# Patient Record
Sex: MALE | Race: WHITE | HISPANIC OR LATINO | Employment: FULL TIME | URBAN - METROPOLITAN AREA
[De-identification: names, ages, dates, MRNs, and addresses within clinical notes are randomized per-mention and may not be internally consistent; named-entity substitution may affect disease eponyms.]

---

## 2025-01-08 ENCOUNTER — OFFICE VISIT (OUTPATIENT)
Dept: ORTHOPEDIC SURGERY | Facility: HOSPITAL | Age: 45
End: 2025-01-08
Payer: COMMERCIAL

## 2025-01-08 VITALS — HEIGHT: 65 IN | BODY MASS INDEX: 32.49 KG/M2 | WEIGHT: 195 LBS

## 2025-01-08 DIAGNOSIS — T84.9XXA: ICD-10-CM

## 2025-01-08 DIAGNOSIS — Z96.612: ICD-10-CM

## 2025-01-08 PROCEDURE — 99215 OFFICE O/P EST HI 40 MIN: CPT | Performed by: ORTHOPAEDIC SURGERY

## 2025-01-08 PROCEDURE — 3008F BODY MASS INDEX DOCD: CPT | Performed by: ORTHOPAEDIC SURGERY

## 2025-01-08 RX ORDER — DOXYCYCLINE 100 MG/1
CAPSULE ORAL
COMMUNITY
Start: 2024-11-25

## 2025-01-08 RX ORDER — MELOXICAM 15 MG/1
15 TABLET ORAL DAILY
COMMUNITY

## 2025-01-08 RX ORDER — LEVOFLOXACIN 750 MG/1
750 TABLET ORAL
COMMUNITY
Start: 2024-12-03 | End: 2025-01-14

## 2025-01-08 NOTE — PROGRESS NOTES
Here for evaluation of his left shoulder this is a second opinion assessment.  He had been seen by me about 10 years ago had an anatomic arthroplasty in the left shoulder had been doing well until he was sliding had an accident and damage to the glenoid component.  He did not have any surgical intervention after that.  After several years later, he developed an infection in the area of the deltoid insertion laterally this was debrided by Dr. Jeremy Roland and at [facility].  He was culture positive for Serratia marcescens and had extended antibiotic course.  The infection recurred he had a second debridement and another course of IV and antibiotics orally for Serratia marcescens and cutibacterium acnes.  He has had a recommendation from his infectious disease doctor to have the prosthetic components removed as they may be a source of his recurrent infection.  Incidental note was the patient had a COVID-vaccine near that area a few weeks before this started.    The patient is pleasant and cooperative.  The patient is alert and oriented ×3.  Auditory function is intact.  The patient is a good historian.  The patient is not in acute distress.  Eye exam significant for nonicteric sclera, intact ocular muscle movement.  Breathing is rhythmic symmetric and nonlabored.  Left shoulder well-healed anterior scar and a small anterolateral scar with a cavitary defect in the soft tissues the scar is slightly erythematous on the lateral side of his shoulder.  No peripheral edema radial pulse easily palpable  strength 5/5.  Shoulder range of motion is quite limited external rotation -10 degrees internal rotation to mid sacrum elevation of 60 degrees actively and 90 degrees passively.    X-rays show disruption of the glenoid component and large amount of heterotopic ossification and apparent loosening of the humeral component.    Chronic infection left shoulder    We discussed the possible causes of the shoulder  infection which includes possible source from the prosthetic components.  We discussed the potential benefit of surgical treatment specifically two-stage explantation with placement of antibiotic spacer, IV antibiotic treatment, replacement with and reverse arthroplasty if possible.  We discussed the possible sources of infection including that the prosthetic components may not be the source of the infection.  We discussed the rehabilitation sequence required after staged surgery I explained to the patient has an extremely complicated procedure and will require preoperative planning including CT scan.  The patient would like to proceed and will be scheduled for CT scan and will follow-up by telephone after that has been obtained.    This was dictated using voice recognition software and not corrected for grammatical or spelling errors.

## 2025-01-17 ENCOUNTER — HOSPITAL ENCOUNTER (OUTPATIENT)
Dept: RADIOLOGY | Facility: CLINIC | Age: 45
Discharge: HOME | End: 2025-01-17
Payer: COMMERCIAL

## 2025-01-17 DIAGNOSIS — Z96.612: ICD-10-CM

## 2025-01-17 DIAGNOSIS — T84.9XXA: ICD-10-CM

## 2025-01-17 PROCEDURE — 76377 3D RENDER W/INTRP POSTPROCES: CPT

## 2025-01-17 PROCEDURE — 73200 CT UPPER EXTREMITY W/O DYE: CPT | Mod: LT

## 2025-01-20 ENCOUNTER — APPOINTMENT (OUTPATIENT)
Dept: RADIOLOGY | Facility: CLINIC | Age: 45
End: 2025-01-20
Payer: COMMERCIAL

## 2025-01-20 ENCOUNTER — OFFICE VISIT (OUTPATIENT)
Dept: ORTHOPEDIC SURGERY | Facility: HOSPITAL | Age: 45
End: 2025-01-20
Payer: COMMERCIAL

## 2025-01-20 DIAGNOSIS — Z96.612: Primary | ICD-10-CM

## 2025-01-20 DIAGNOSIS — T84.9XXA: Primary | ICD-10-CM

## 2025-01-20 PROCEDURE — 99213 OFFICE O/P EST LOW 20 MIN: CPT | Performed by: ORTHOPAEDIC SURGERY

## 2025-01-20 NOTE — PROGRESS NOTES
Patient is here for review of his CT scan of the left shoulder.    CT scan shows a large amount of heterotopic ossification displacement of the glenoid component and no obvious loosening of the humeral component.    Left shoulder infected prosthesis    We had a detailed discussion about treatment options at this point and I have recommended surgery the patient is scheduled for March 4 the procedure would be a left shoulder open debridement excision of heterotopic ossification removal of prosthesis insertion of antibiotic spacer.  This will be using [employer] instrumentation.    Scheduled for surgery.    This was dictated using voice recognition software and not corrected for grammatical or spelling errors.

## 2025-01-24 ENCOUNTER — PREP FOR PROCEDURE (OUTPATIENT)
Dept: ORTHOPEDIC SURGERY | Facility: HOSPITAL | Age: 45
End: 2025-01-24
Payer: COMMERCIAL

## 2025-01-24 DIAGNOSIS — Z96.612 INFECTION ASSOCIATED WITH PROSTHESIS OF LEFT SHOULDER JOINT (CMS-HCC): ICD-10-CM

## 2025-01-24 DIAGNOSIS — T84.59XA INFECTION ASSOCIATED WITH PROSTHESIS OF LEFT SHOULDER JOINT (CMS-HCC): ICD-10-CM

## 2025-02-17 ENCOUNTER — TELEPHONE (OUTPATIENT)
Dept: PREADMISSION TESTING | Facility: HOSPITAL | Age: 45
End: 2025-02-17
Payer: COMMERCIAL

## 2025-02-24 ENCOUNTER — DOCUMENTATION (OUTPATIENT)
Dept: PREADMISSION TESTING | Facility: HOSPITAL | Age: 45
End: 2025-02-24

## 2025-02-24 ENCOUNTER — PRE-ADMISSION TESTING (OUTPATIENT)
Dept: PREADMISSION TESTING | Facility: HOSPITAL | Age: 45
End: 2025-02-24
Payer: COMMERCIAL

## 2025-02-24 VITALS
HEART RATE: 73 BPM | WEIGHT: 200.62 LBS | HEIGHT: 65 IN | TEMPERATURE: 97.1 F | BODY MASS INDEX: 33.43 KG/M2 | SYSTOLIC BLOOD PRESSURE: 140 MMHG | RESPIRATION RATE: 16 BRPM | OXYGEN SATURATION: 97 % | DIASTOLIC BLOOD PRESSURE: 90 MMHG

## 2025-02-24 DIAGNOSIS — T84.59XA INFECTION ASSOCIATED WITH PROSTHESIS OF LEFT SHOULDER JOINT (CMS-HCC): Primary | ICD-10-CM

## 2025-02-24 DIAGNOSIS — Z96.612 INFECTION ASSOCIATED WITH PROSTHESIS OF LEFT SHOULDER JOINT (CMS-HCC): Primary | ICD-10-CM

## 2025-02-24 PROCEDURE — 87081 CULTURE SCREEN ONLY: CPT | Mod: AHULAB | Performed by: NURSE PRACTITIONER

## 2025-02-24 PROCEDURE — 99204 OFFICE O/P NEW MOD 45 MIN: CPT | Performed by: NURSE PRACTITIONER

## 2025-02-24 RX ORDER — CHLORHEXIDINE GLUCONATE ORAL RINSE 1.2 MG/ML
SOLUTION DENTAL
Qty: 473 ML | Refills: 0 | Status: SHIPPED | OUTPATIENT
Start: 2025-02-24

## 2025-02-24 RX ORDER — LEVOFLOXACIN 750 MG/1
750 TABLET ORAL DAILY
COMMUNITY

## 2025-02-24 NOTE — PREPROCEDURE INSTRUCTIONS
Medication List            Accurate as of February 24, 2025  8:21 AM. Always use your most recent med list.                chlorhexidine 0.12 % solution  Commonly known as: Peridex  SWISH AND SPIT 15ML FOR 30 SECONDS NIGHT PRIOR TO SURGERY AND MORNING OF SURGERY  Medication Adjustments for Surgery: Take/Use as prescribed     doxycycline 100 mg capsule  Commonly known as: Vibramycin  Medication Adjustments for Surgery: Take/Use as prescribed     levoFLOXacin 750 mg tablet  Commonly known as: Levaquin     meloxicam 15 mg tablet  Commonly known as: Mobic  Additional Medication Adjustments for Surgery: Take last dose 7 days before surgery                Preoperative Deep Breathing Exercises  Why it is important to do deep breathing exercises before my surgery?  Deep breathing exercises strengthen your breathing muscles.  This helps you to recover after your surgery and decreases the chance of breathing complications.  How are the deep breathing exercises done?  Sit straight with your back supported.  Breathe in deeply and slowly through your nose. Your lower rib cage should expand and your abdomen may move forward.  Hold that breath for 3 to 5 seconds.  Breathe out through pursed lips, slowly and completely.  Rest and repeat 10 times every hour while awake.  Rest longer if you become dizzy or lightheaded.        CONTACT SURGEON'S OFFICE IF YOU DEVELOP:  * Fever = 100.4 F   * New respiratory symptoms (e.g. cough, shortness of breath, respiratory distress, sore throat)  * Recent loss of taste or smell  *Flu like symptoms such as headache, fatigue or gastrointestinal symptoms  * You develop any open sores, shingles, burning or painful urination   AND/OR:  * You no longer wish to have the surgery.  * Any other personal circumstances change that may lead to the need to cancel or defer this surgery.  *You were admitted to any hospital within one week of your planned procedure.    SMOKING:  *Quitting smoking can make a huge  difference to your health and recovery from surgery.    *If you need help with quitting, call 0-991-QUIT-NOW.    THE DAY OF SURGERY:  *Do not eat any food after midnight the night before your surgery.   *YOU MUST drink 14 OUNCES of clear liquids TWO hours before your instructed ARRIVAL TIME to the hospital. This includes water, black tea/coffee (no milk or cream), apple juice, clear broth and electrolyte drinks (Gatorade).  Please avoid clear liquids that are red in color.   *You may chew gum/mints up to TWO hours before your surgery/procedure.    SURGICAL TIME:  *You will be contacted between 2 p.m. and 6 p.m. the business day before your surgery with your arrival time.  *If you haven't received a call by 6pm, call [phone].  *Scheduled surgery times may change and you will be notified if this occurs-check your personal voicemail for any updates.    ON THE MORNING OF SURGERY:  *Wear comfortable, loose fitting clothing.   *Do not use moisturizers, creams, lotions or perfume.  *All jewelry and valuables should be left at home.  *Prosthetic devices such as contact lenses, hearing aids, dentures, eyelash extensions, hairpins and body piercing must be removed before surgery.    BRING WITH YOU:  *Photo ID and insurance card  *Current list of medications and allergies  *Pacemaker/Defibrillator/Heart stent cards  *CPAP machine and mask  *Slings/splints/crutches  *Copy of your complete Advanced Directive/DHPOA-if applicable  *Neurostimulator implant remote    PARKING AND ARRIVAL:  *Check in at the Main Entrance desk and let them know you are here for surgery.  *You will be directed to the 2nd floor surgical waiting area.    IF YOU ARE HAVING OUTPATIENT/SAME DAY SURGERY:  *A responsible adult MUST accompany you at the time of discharge and stay with you for 24 hours after your surgery.  *You may NOT drive yourself home after surgery.  *You may use a taxi or ride sharing service (Lyft, Uber) to return home ONLY if you are  accompanied by a friend or family member.  *Instructions for resuming your medications will be provided by your surgeon.          Patient Information: Oral/Dental Rinse  **This is a prescription; pick it up at your preferred local pharmacy **  What is oral/dental rinse?   It is a mouthwash. It is a way of cleaning the mouth with a germ killing solution before your surgery.  The solution contains chlorhexidine, commonly known as CHG.   It is used inside the mouth to kill a bacteria known as Staphylococcus aureus.  Let your doctor know if you are allergic to Chlorhexidine.    Why do I need to use CHG oral/dental rinse?  The CHG oral/dental rinse helps to kill a bacteria in your mouth known a Staphylococcus aureus.     This reduces the risk of infection at the surgical site.      Using your CHG oral/dental rinse  STEPS:  Use your CHG oral/dental rinse after you brush your teeth the night before (at bedtime) and the morning of your surgery.  Follow all directions on your prescription label.    Use the cap on the container to measure 15ml (fill cap to fill line)  Swish (gargle if you can) the mouthwash in your mouth for at least 30 seconds, (do not to swallow) spit out  After you use your CHG rinse, do not rinse your mouth with water, drink or eat.  Please refer to prescription label for the appropriate time to resume oral intake  Dental rinse comes in one size bottle: 473ml ~16oz.  You will have leftover    rinse, discard after this use.    What side effects might I have using the CHG oral/dental rinse?  CHG rinse will stick to plaque on the teeth.  Brush and floss just before use.  Teeth brushing will help avoid staining of plaque during use.    Who should I contact if I have questions about the CHG oral/dental rinse?  Please call [facility], Preadmission Testing at [phone] if you have any questions      Home Preoperative Antibacterial Shower     What is a home preoperative  antibacterial shower?  This shower is a way of cleaning the skin with a germ killing soap before surgery.  The soap contains chlorhexidine, commonly known as CHG.  CHG is a soap for your skin with germ killing ability.  Let your doctor know if you are allergic to chlorhexidine.    Why do I need to take a preoperative antibacterial shower?  Skin is not sterile.  It is best to try to make your skin as free of germs as possible before surgery.  Proper cleansing with a germ killing soap before surgery can lower the number of germs on your skin.  This helps to reduce the risk of infection at the surgical site.  Following the instructions listed below will help you prepare your skin for surgery.      How do I use the CHG skin cleanser?  Steps:  Begin using your CHG soap five days before your scheduled surgery on ________________________.    Days 1-4 Shower before bed:  Wash your face and genitals with your normal soap and rinse.           2.    Apply the CHG soap to a clean wet washcloth.  Turn the water off or move away                From the water spray to avoid premature rinsing of the CHG soap as you are applying.     3.   Lather your entire body from the neck down.  Do not use on your face or genitals.  4. Pay special attention to the area(s) where your incision(s) will be located unless they are on your face.  Avoid scrubbing your skin too hard.  The important point is to have the CHG soap sit on your skin for 3 minutes.    When the 3 minutes are up, turn on the water and rinse the CHG soap off your body completely.   Pat yourself dry with a clean, freshly-laundered towel.  Dress in clean, freshly laundered night clothes.    Be sure to sleep with clean, freshly laundered sheets.  Day 5:  Last shower is the morning before surgery: Follow above Instructions.    NOTE:        *Keep CHG soap out of eyes and ear canals   *DO NOT wash with regular soap on your body after you have used the CHG soap solution  *DO NOT apply  powders, lotions, or perfume.  *Deodorant may be used days 1-4, BUT NOT the day of surgery    Who should I contact if I have any questions regarding the use of CHG soap?  Call the [facility], Preadmission Testing at [phone] if you have any questions.              Patient Information: Pre-Operative Infection Prevention Measures     Why did I have my nose, under my arms and groin swabbed?  The purpose of the swab is to identify Staphylococcus aureus inside your nose or on your skin.  The swab was sent to the laboratory for culture.  A positive swab/culture for Staphylococcus aureus is called colonization or carriage.      What is Staphylococcus aureus?  Staphylococcus aureus, also known as “staph”, is a germ found on the skin or in the nose of healthy people.  Sometimes Staphylococcus aureus can get into the body and cause an infection.  This can be minor (such as pimples, boils or other skin problems).  It might also be serious (such as blood infection, pneumonia or a surgical site infection).    What is Staphylococcus aureus colonization or carriage?  Colonization or carriage means that a person has the germ but is not sick from it.  These bacteria can be spread on the hands or when breathing or sneezing.    How is Staphylococcus aureus spread?  It is most often spread by close contact with a person or item that carries it.    What happens if my culture is positive for Staphylococcus aureus?  Your doctor/medical team will use this information to guide any antibiotic treatment which may be necessary.  Regardless of the culture results, we will clean the inside of your nose with a betadine swab just before you have your surgery.      Will I get an infection if I have Staphylococcus aureus in my nose or on my skin?  Anyone can get an infection with Staphylococcus aureus.  However, the best way to reduce your risk of infection is to follow the instructions provided to you for the use  of your CHG soap and dental rinse.        Who should I contact if I have any questions?  Call the [facility], Preadmission Testing at [phone] if you have any questions.

## 2025-02-24 NOTE — PREPROCEDURE INSTRUCTIONS
Medication List            Accurate as of February 24, 2025  8:07 AM. Always use your most recent med list.                chlorhexidine 0.12 % solution  Commonly known as: Peridex  SWISH AND SPIT 15ML FOR 30 SECONDS NIGHT PRIOR TO SURGERY AND MORNING OF SURGERY  Medication Adjustments for Surgery: Take/Use as prescribed     doxycycline 100 mg capsule  Commonly known as: Vibramycin  Medication Adjustments for Surgery: Take/Use as prescribed     meloxicam 15 mg tablet  Commonly known as: Mobic  Additional Medication Adjustments for Surgery: Take last dose 7 days before surgery                Preoperative Deep Breathing Exercises  Why it is important to do deep breathing exercises before my surgery?  Deep breathing exercises strengthen your breathing muscles.  This helps you to recover after your surgery and decreases the chance of breathing complications.  How are the deep breathing exercises done?  Sit straight with your back supported.  Breathe in deeply and slowly through your nose. Your lower rib cage should expand and your abdomen may move forward.  Hold that breath for 3 to 5 seconds.  Breathe out through pursed lips, slowly and completely.  Rest and repeat 10 times every hour while awake.  Rest longer if you become dizzy or lightheaded.        CONTACT SURGEON'S OFFICE IF YOU DEVELOP:  * Fever = 100.4 F   * New respiratory symptoms (e.g. cough, shortness of breath, respiratory distress, sore throat)  * Recent loss of taste or smell  *Flu like symptoms such as headache, fatigue or gastrointestinal symptoms  * You develop any open sores, shingles, burning or painful urination   AND/OR:  * You no longer wish to have the surgery.  * Any other personal circumstances change that may lead to the need to cancel or defer this surgery.  *You were admitted to any hospital within one week of your planned procedure.    SMOKING:  *Quitting smoking can make a huge difference to your health and recovery from surgery.    *If  you need help with quitting, call 5-548-QUIT-NOW.    THE DAY OF SURGERY:  *Do not eat any food after midnight the night before your surgery.   *YOU MUST drink 14 OUNCES of clear liquids TWO hours before your instructed ARRIVAL TIME to the hospital. This includes water, black tea/coffee (no milk or cream), apple juice, clear broth and electrolyte drinks (Gatorade).  Please avoid clear liquids that are red in color.   *You may chew gum/mints up to TWO hours before your surgery/procedure.    SURGICAL TIME:  *You will be contacted between 2 p.m. and 6 p.m. the business day before your surgery with your arrival time.  *If you haven't received a call by 6pm, call [phone].  *Scheduled surgery times may change and you will be notified if this occurs-check your personal voicemail for any updates.    ON THE MORNING OF SURGERY:  *Wear comfortable, loose fitting clothing.   *Do not use moisturizers, creams, lotions or perfume.  *All jewelry and valuables should be left at home.  *Prosthetic devices such as contact lenses, hearing aids, dentures, eyelash extensions, hairpins and body piercing must be removed before surgery.    BRING WITH YOU:  *Photo ID and insurance card  *Current list of medications and allergies  *Pacemaker/Defibrillator/Heart stent cards  *CPAP machine and mask  *Slings/splints/crutches  *Copy of your complete Advanced Directive/DHPOA-if applicable  *Neurostimulator implant remote    PARKING AND ARRIVAL:  *Check in at the Main Entrance desk and let them know you are here for surgery.  *You will be directed to the 2nd floor surgical waiting area.    IF YOU ARE HAVING OUTPATIENT/SAME DAY SURGERY:  *A responsible adult MUST accompany you at the time of discharge and stay with you for 24 hours after your surgery.  *You may NOT drive yourself home after surgery.  *You may use a taxi or ride sharing service ([employer], Uber) to return home ONLY if you are accompanied by a friend or family member.  *Instructions  for resuming your medications will be provided by your surgeon.        Patient Information: Oral/Dental Rinse  **This is a prescription; pick it up at your preferred local pharmacy **  What is oral/dental rinse?   It is a mouthwash. It is a way of cleaning the mouth with a germ killing solution before your surgery.  The solution contains chlorhexidine, commonly known as CHG.   It is used inside the mouth to kill a bacteria known as Staphylococcus aureus.  Let your doctor know if you are allergic to Chlorhexidine.    Why do I need to use CHG oral/dental rinse?  The CHG oral/dental rinse helps to kill a bacteria in your mouth known a Staphylococcus aureus.     This reduces the risk of infection at the surgical site.      Using your CHG oral/dental rinse  STEPS:  Use your CHG oral/dental rinse after you brush your teeth the night before (at bedtime) and the morning of your surgery.  Follow all directions on your prescription label.    Use the cap on the container to measure 15ml (fill cap to fill line)  Swish (gargle if you can) the mouthwash in your mouth for at least 30 seconds, (do not to swallow) spit out  After you use your CHG rinse, do not rinse your mouth with water, drink or eat.  Please refer to prescription label for the appropriate time to resume oral intake  Dental rinse comes in one size bottle: 473ml ~16oz.  You will have leftover    rinse, discard after this use.    What side effects might I have using the CHG oral/dental rinse?  CHG rinse will stick to plaque on the teeth.  Brush and floss just before use.  Teeth brushing will help avoid staining of plaque during use.    Who should I contact if I have questions about the CHG oral/dental rinse?  Please call [facility], Preadmission Testing at [phone] if you have any questions      Home Preoperative Antibacterial Shower     What is a home preoperative antibacterial shower?  This shower is a way of cleaning the skin  with a germ killing soap before surgery.  The soap contains chlorhexidine, commonly known as CHG.  CHG is a soap for your skin with germ killing ability.  Let your doctor know if you are allergic to chlorhexidine.    Why do I need to take a preoperative antibacterial shower?  Skin is not sterile.  It is best to try to make your skin as free of germs as possible before surgery.  Proper cleansing with a germ killing soap before surgery can lower the number of germs on your skin.  This helps to reduce the risk of infection at the surgical site.  Following the instructions listed below will help you prepare your skin for surgery.      How do I use the CHG skin cleanser?  Steps:  Begin using your CHG soap five days before your scheduled surgery on ________________________.    Days 1-4 Shower before bed:  Wash your face and genitals with your normal soap and rinse.           2.    Apply the CHG soap to a clean wet washcloth.  Turn the water off or move away                From the water spray to avoid premature rinsing of the CHG soap as you are applying.     3.   Lather your entire body from the neck down.  Do not use on your face or genitals.  4. Pay special attention to the area(s) where your incision(s) will be located unless they are on your face.  Avoid scrubbing your skin too hard.  The important point is to have the CHG soap sit on your skin for 3 minutes.    When the 3 minutes are up, turn on the water and rinse the CHG soap off your body completely.   Pat yourself dry with a clean, freshly-laundered towel.  Dress in clean, freshly laundered night clothes.    Be sure to sleep with clean, freshly laundered sheets.  Day 5:  Last shower is the morning before surgery: Follow above Instructions.    NOTE:        *Keep CHG soap out of eyes and ear canals   *DO NOT wash with regular soap on your body after you have used the CHG soap solution  *DO NOT apply powders, lotions, or perfume.  *Deodorant may be used days 1-4,  BUT NOT the day of surgery    Who should I contact if I have any questions regarding the use of CHG soap?  Call the [facility], Preadmission Testing at [phone] if you have any questions.              Patient Information: Pre-Operative Infection Prevention Measures     Why did I have my nose, under my arms and groin swabbed?  The purpose of the swab is to identify Staphylococcus aureus inside your nose or on your skin.  The swab was sent to the laboratory for culture.  A positive swab/culture for Staphylococcus aureus is called colonization or carriage.      What is Staphylococcus aureus?  Staphylococcus aureus, also known as “staph”, is a germ found on the skin or in the nose of healthy people.  Sometimes Staphylococcus aureus can get into the body and cause an infection.  This can be minor (such as pimples, boils or other skin problems).  It might also be serious (such as blood infection, pneumonia or a surgical site infection).    What is Staphylococcus aureus colonization or carriage?  Colonization or carriage means that a person has the germ but is not sick from it.  These bacteria can be spread on the hands or when breathing or sneezing.    How is Staphylococcus aureus spread?  It is most often spread by close contact with a person or item that carries it.    What happens if my culture is positive for Staphylococcus aureus?  Your doctor/medical team will use this information to guide any antibiotic treatment which may be necessary.  Regardless of the culture results, we will clean the inside of your nose with a betadine swab just before you have your surgery.      Will I get an infection if I have Staphylococcus aureus in my nose or on my skin?  Anyone can get an infection with Staphylococcus aureus.  However, the best way to reduce your risk of infection is to follow the instructions provided to you for the use of your CHG soap and dental rinse.        Who should I contact  if I have any questions?  Call the [facility], Preadmission Testing at [phone] if you have any questions.

## 2025-02-24 NOTE — CPM/PAT NURSE NOTE
CPM/PAT Nurse Note      Name: Ernesto Thurston (Ernesto Thurston)  /Age: [birth year]44 y.o.       Past Medical History:   Diagnosis Date    Asthma     ED (erectile dysfunction)     Hyperlipidemia     Infection of prosthetic shoulder joint (CMS-HCC)     Plan: Left Shoulder Open Debridement; Excision of Heterotopic Ossification; Removal of Prosthesis; Insertion of Antibiotic Spacer 3/4/25    Mass of skin of left shoulder     Obesity     Osteoarthritis of both hips resulting from hip dysplasia     Other conditions influencing health status     Chronic Pulmonary Embolism    PONV (postoperative nausea and vomiting)     Pulmonary embolism     Post-op PE s/p Embolectomy and IVC filter       Past Surgical History:   Procedure Laterality Date    ABCESS DRAINAGE Left 02/15/2023    shoulder    ABCESS DRAINAGE Left 2024    left shoulder, wound cultures    ANTERIOR CRUCIATE LIGAMENT REPAIR      EMBOLECTOMY      Pulmonary Artery Embolectomy    HERNIA REPAIR      MENISCECTOMY Left 2021    OTHER SURGICAL HISTORY      IVC filter placed    TOTAL SHOULDER ARTHROPLASTY Left     VASECTOMY      WOUND EXPLORATION  2022    EXPLORATION LEFT SHOULDER, I&D       Patient Sexual activity questions deferred to the physician.    Family History   Problem Relation Name Age of Onset    Heart attack Father      Coronary artery disease Father          h/o CABG    Coronary artery disease Paternal Grandfather         No Known Allergies    Prior to Admission medications    Medication Sig Start Date End Date Taking? Authorizing Provider   chlorhexidine (Peridex) 0.12 % solution SWISH AND SPIT 15ML FOR 30 SECONDS NIGHT PRIOR TO SURGERY AND MORNING OF SURGERY 25   KAREN Bass-CNP   doxycycline (Vibramycin) 100 mg capsule  24   Historical Provider, MD   meloxicam (Mobic) 15 mg tablet Take 1 tablet (15 mg) by mouth once daily.    Historical Provider, MD PAZ ROS     DASI Risk Score    No data to display        Caprini DVT Assessment    No data to display       Modified Frailty Index    No data to display       CHADS2 Stroke Risk         N/A 3 to 100%: High Risk   2 to < 3%: Medium Risk   0 to < 2%: Low Risk     Last Change: N/A          This score determines the patient's risk of having a stroke if the patient has atrial fibrillation.        This score is not applicable to this patient. Components are not calculated.          Revised Cardiac Risk Index    No data to display       Apfel Simplified Score    No data to display       Risk Analysis Index Results This Encounter    No data found in the last 10 encounters.       Prodigy: High Risk  Total Score: 8              Prodigy Gender Score          ARISCAT Score for Postoperative Pulmonary Complications    No data to display       Mills Perioperative Risk for Myocardial Infarction or Cardiac Arrest (SANTI)    No data to display         Nurse Plan of Action:     After Visit Summary (AVS) reviewed and patient verbalized good understanding of medications and NPO instructions.  Pre-op infection prevention measures:  CHG showers and mouthwash reviewed, understanding voiced.  CHG soap given and patient verbalized need to pick CHG mouthwash at their preferred local pharmacy.

## 2025-02-26 LAB — STAPHYLOCOCCUS SPEC CULT: NORMAL

## 2025-02-27 NOTE — CPM/PAT H&P
[facility] Evaluation       Name: Ernesto Thurston (Ernesto Thurston)  /Age: 1980/44 y.o.     In-Person         Date of Consult: 25    Referring Provider:  Dr. Crain    Date, Surgery, and Length:  3/4/25, left shoulder open debridement, excision of heterotopic ossification, removal of prosthesis, insertion of antibiotic spacer-150 minutes    Patient presents to [facility] for perioperative risk assessment prior to scheduled surgery. Patient presents with left shoulder infected prosthesis. He underwent arthroplasty 10 years ago and damaged his glenoid in an accident. After several years later, he developed an infection in the area of the deltoid insertion laterally this was debrided at [facility]. He was culture positive for Serratia marcescens and had extended antibiotic course. The infection recurred he had a second debridement and another course of IV and antibiotics orally for Serratia marcescens and cutibacterium acnes. He has had a recommendation from his infectious disease doctor to have the prosthetic components removed as they may be a source of his recurrent infection.     This note was created in part upon personal review of patient's medical records.      Pt denies any past history of anesthetic complications such as PONV, awareness, prolonged sedation, dental damage, aspiration, cardiac arrest, difficult intubation, difficult I.V. access or unexpected hospital admissions. No history of malignant hyperthermia and or pseudocholinesterase deficiency.    No history of blood transfusions.    The patient IS NOT a Judaism and will accept blood and blood products if medically indicated.     Type and screen not sent.    Past Medical History:   Diagnosis Date    Asthma     ED (erectile dysfunction)     Hyperlipidemia     Infection of prosthetic shoulder joint (CMS-[facility])     Plan: Left Shoulder Open Debridement; Excision of Heterotopic Ossification; Removal of Prosthesis; Insertion  "of Antibiotic Spacer 3/4/25    Mass of skin of left shoulder     Obesity     Osteoarthritis of both hips resulting from hip dysplasia     Other conditions influencing health status     Chronic Pulmonary Embolism    PONV (postoperative nausea and vomiting)     Pulmonary embolism     Post-op PE s/p Embolectomy and IVC filter       Past Surgical History:   Procedure Laterality Date    ABCESS DRAINAGE Left 02/15/2023    shoulder    ABCESS DRAINAGE Left 11/13/2024    left shoulder, wound cultures    ANTERIOR CRUCIATE LIGAMENT REPAIR      EMBOLECTOMY      Pulmonary Artery Embolectomy    HERNIA REPAIR      MENISCECTOMY Left 12/08/2021    OTHER SURGICAL HISTORY      IVC filter placed    TOTAL SHOULDER ARTHROPLASTY Left 2014    VASECTOMY      WOUND EXPLORATION  09/02/2022    EXPLORATION LEFT SHOULDER, I&D       Family History   Problem Relation Name Age of Onset    Heart attack Father      Coronary artery disease Father          h/o CABG    Coronary artery disease Paternal Grandfather       Social History     Tobacco Use   Smoking Status Never   Smokeless Tobacco Never     Social History     Substance and Sexual Activity   Alcohol Use Not Currently     Social History     Substance and Sexual Activity   Drug Use Never       No Known Allergies    Current Outpatient Medications   Medication Instructions    chlorhexidine (Peridex) 0.12 % solution SWISH AND SPIT 15ML FOR 30 SECONDS NIGHT PRIOR TO SURGERY AND MORNING OF SURGERY    doxycycline (Vibramycin) 100 mg capsule     levoFLOXacin (LEVAQUIN) 750 mg, Daily    meloxicam (MOBIC) 15 mg, Daily        PAT ROS     PAT Physical Exam     Airway    Visit Vitals  /90   Pulse 73   Temp 36.2 °C (97.1 °F)   Resp 16   Ht 1.65 m (5' 4.96\")   Wt 91 kg (200 lb 9.9 oz)   SpO2 97%   BMI 33.42 kg/m²   Smoking Status Never   BSA 2.04 m²     Assessment and Plan:     Patient is a 44 year old male scheduled for left shoulder open debridement, excision of heterotopic ossification, removal of " prosthesis, insertion of antibiotic spacer-left  with Dr. Crain on 3/4/25.    Patient is at acceptable risk to proceed with planned surgical procedure. Further cardiac risk stratification deferred at this time.This patient is LOW-INTERMEDIATE risk candidate undergoing MODERATE risk procedure, patient is medically optimized for surgery.    Plan    Cardiovascular:    RCRI: 0  Risk of Mace: 3.9%      Patient denies any chest pain, tightness, heaviness, pressure, radiating pain, palpitations, irregular heartbeats, lightheadedness, cough, congestion, shortness of breath, PETERSON, PND, near syncope, weight loss or gain.    Fair functional capacity      EKG in PAT not obtained.    Pulmonary:  No pulmonary diagnosis, however patient is at increased risk of perioperative complications secondary to  types of anesthetic  Stop Bang score is 2 placing patient at low risk for ANGELA  ARISCAT: 26-44 points, 13.3% risk of in-hospital postoperative pulmonary complication  PRODIGY: Moderate risk for opioid induced respiratory depression      Renal/endo:  Recommendations to avoid nephrotoxic drugs and carefully monitor fluid status to maintain euvolemia. Use dose adjusted medications as needed for the underlying level of renal function.      Heme:  Patient instructed to ambulate as soon as possible postoperatively to decrease thromboembolic risk.    Initiate mechanical DVT prophylaxis as soon as possible and initiate chemical prophylaxis when deemed safe from a bleeding standpoint post surgery.    Post-op PE s/p embolectomy and IVC filter    Caprini= 6      Risk assessment complete.  Patient is scheduled for a INTERMEDIATE surgical risk procedure.     He IS considered medically optimized for the planned procedure.        Labs/testing obtained in PAT on 2/27/25: MRSA. Reviewed last outside CBC, BMP, CRP    CBC WITH DIFFERENTIAL  Order: [id number]  Component  Ref Range & Units 1 mo ago   WBC  4.5 - 11.5 K/uL 7   RBC  4.50 - 5.90 M/uL 5.87    Hemoglobin  13.9 - 16.3 g/dL 16.6 High    Hematocrit  41.0 - 53.0 % 48.5   MCV  80 - 100 fL 83   MCH  26.0 - 34.0 pg 28.3   MCHC  32.0 - 35.9 g/dL 34.3   Platelet  150 - 400 K/uL 314   RDW-CV  11.5 - 14.5 % 14   MPV  7.5 - 11.2 fL 7.6   Neutrophils  31.0 - 76.0 % 56.7   Neutrophil #  1.50 - 8.00 K/uL 3.95   Lymphocytes  24.0 - 44.0 % 31.7   Lymphocytes #  1.00 - 4.80 K/uL 2.21   Monocytes  2.0 - 11.0 % 7.2   Monocyte #  0.20 - 1.00 K/uL 0.5   Eosinophil  0.1 - 4.0 % 4   Eosinophil #  0.00 - 0.70 K/uL 0.28   Basophils  <=1.9 % 0.4   Basophil #  0.00 - 0.20 K/uL 0.03   Resulting Agency [facility]     Specimen Collected: 01/08/25 14:50    Performed by: [facility] Last Resulted: 01/08/25 18:15       BASIC METABOLIC PANEL  Order: [id number]  Component  Ref Range & Units 1 mo ago   Glucose  74 - 109 mg/dL 93   Sodium  136 - 145 mmol/L 140   Potassium  3.5 - 5.0 mmol/L 4.3   Carbon Dioxide  21 - 31 mmol/L 29   Chloride  98 - 107 mmol/L 102   Blood Urea Nitrogen  7 - 25 mg/dL 12   Creatinine  0.70 - 1.30 mg/dL 0.97   Calcium  8.6 - 10.3 mg/dL 9.5   Anion Gap  10 - 20 13   Estimated GFR (CKD-EPI)  >=60 mL/min/1.73sqm 98   Comment: 2021 CKD EPI Equation using Creatinine without Race  Comment:  Estimated glomerular filtration rate (eGFR) is calculated without a race coefficient. Values should be interpreted in the context of the patient's full clinical presentation.  Reference:  1. Teja C, Carla M, Paulie STARR, et al.. A Unifying Approach for GFR Estimation: Recommendations of the NKF-ASN Task Force on Reassessing the Inclusion of Race in Diagnosing Kidney Disease. American Journal of Kidney Diseases 2022;79(2):268-88.e1.  2. N Engl J Med 2021 Vol. 385 Issue 19 Pages 0690-5646   Resulting Agency [facility]     Specimen Collected: 01/08/25 14:50    Performed by: [facility] Last Resulted: 01/08/25 18:10   C-REACTIVE PROTEIN  Order: [id number]  Component  Ref Range & Units  1 mo ago   C-Reactive Protein  <0.5 mg/dL <0.5   Resulting Agency [facility]     Specimen Collected: 01/08/25 14:50    Performed by: [facility] Last Resulted: 01/08/25 18:10       Follow up/communication: none    Preoperative medication instructions were provided and reviewed with the patient.  Any additional testing or evaluation was explained to the patient.  Nothing by mouth instructions were discussed and patient's questions were answered prior to conclusion to this encounter.  Patient verbalized understanding of preoperative instructions given in preadmission testing; discharge instructions available in EMR.    This note was dictated with speech recognition.  Minor errors may have been detected during use of speech recognition.

## 2025-03-04 ENCOUNTER — ANESTHESIA EVENT (OUTPATIENT)
Dept: OPERATING ROOM | Facility: HOSPITAL | Age: 45
End: 2025-03-04
Payer: COMMERCIAL

## 2025-03-04 ENCOUNTER — ANESTHESIA (OUTPATIENT)
Dept: OPERATING ROOM | Facility: HOSPITAL | Age: 45
End: 2025-03-04
Payer: COMMERCIAL

## 2025-03-04 ENCOUNTER — APPOINTMENT (OUTPATIENT)
Dept: RADIOLOGY | Facility: HOSPITAL | Age: 45
End: 2025-03-04
Payer: COMMERCIAL

## 2025-03-04 ENCOUNTER — HOSPITAL ENCOUNTER (OUTPATIENT)
Facility: HOSPITAL | Age: 45
Setting detail: OBSERVATION
LOS: 1 days | Discharge: HOME | End: 2025-03-07
Attending: ORTHOPAEDIC SURGERY | Admitting: ORTHOPAEDIC SURGERY
Payer: COMMERCIAL

## 2025-03-04 DIAGNOSIS — Z96.612 INFECTION ASSOCIATED WITH PROSTHESIS OF LEFT SHOULDER JOINT (CMS-HCC): ICD-10-CM

## 2025-03-04 DIAGNOSIS — T84.59XA INFECTION ASSOCIATED WITH PROSTHESIS OF LEFT SHOULDER JOINT (CMS-HCC): ICD-10-CM

## 2025-03-04 DIAGNOSIS — Z98.890 STATUS POST SHOULDER SURGERY: Primary | ICD-10-CM

## 2025-03-04 PROBLEM — E78.5 HYPERLIPIDEMIA: Status: ACTIVE | Noted: 2025-03-04

## 2025-03-04 PROBLEM — E66.9 OBESITY: Status: ACTIVE | Noted: 2025-03-04

## 2025-03-04 PROBLEM — J45.909 ASTHMA: Status: ACTIVE | Noted: 2025-03-04

## 2025-03-04 PROCEDURE — C1776 JOINT DEVICE (IMPLANTABLE): HCPCS | Performed by: ORTHOPAEDIC SURGERY

## 2025-03-04 PROCEDURE — 87116 MYCOBACTERIA CULTURE: CPT | Mod: AHULAB | Performed by: ORTHOPAEDIC SURGERY

## 2025-03-04 PROCEDURE — 2500000005 HC RX 250 GENERAL PHARMACY W/O HCPCS: Performed by: ANESTHESIOLOGY

## 2025-03-04 PROCEDURE — 64415 NJX AA&/STRD BRCH PLXS IMG: CPT | Performed by: ANESTHESIOLOGY

## 2025-03-04 PROCEDURE — 7100000002 HC RECOVERY ROOM TIME - EACH INCREMENTAL 1 MINUTE: Performed by: ORTHOPAEDIC SURGERY

## 2025-03-04 PROCEDURE — 9420000001 HC RT PATIENT EDUCATION 5 MIN

## 2025-03-04 PROCEDURE — 2500000005 HC RX 250 GENERAL PHARMACY W/O HCPCS: Performed by: ORTHOPAEDIC SURGERY

## 2025-03-04 PROCEDURE — 2500000004 HC RX 250 GENERAL PHARMACY W/ HCPCS (ALT 636 FOR OP/ED): Performed by: ANESTHESIOLOGY

## 2025-03-04 PROCEDURE — 73020 X-RAY EXAM OF SHOULDER: CPT | Mod: LEFT SIDE | Performed by: STUDENT IN AN ORGANIZED HEALTH CARE EDUCATION/TRAINING PROGRAM

## 2025-03-04 PROCEDURE — 3600000005 HC OR TIME - INITIAL BASE CHARGE - PROCEDURE LEVEL FIVE: Performed by: ORTHOPAEDIC SURGERY

## 2025-03-04 PROCEDURE — 3600000010 HC OR TIME - EACH INCREMENTAL 1 MINUTE - PROCEDURE LEVEL FIVE: Performed by: ORTHOPAEDIC SURGERY

## 2025-03-04 PROCEDURE — 73020 X-RAY EXAM OF SHOULDER: CPT | Mod: LT

## 2025-03-04 PROCEDURE — A23334: Performed by: ANESTHESIOLOGIST ASSISTANT

## 2025-03-04 PROCEDURE — 2780000003 HC OR 278 NO HCPCS: Performed by: ORTHOPAEDIC SURGERY

## 2025-03-04 PROCEDURE — 87206 SMEAR FLUORESCENT/ACID STAI: CPT | Mod: AHULAB | Performed by: ORTHOPAEDIC SURGERY

## 2025-03-04 PROCEDURE — A23334: Performed by: ANESTHESIOLOGY

## 2025-03-04 PROCEDURE — 7100000011 HC EXTENDED STAY RECOVERY HOURLY - NURSING UNIT

## 2025-03-04 PROCEDURE — 2500000001 HC RX 250 WO HCPCS SELF ADMINISTERED DRUGS (ALT 637 FOR MEDICARE OP): Performed by: PHYSICIAN ASSISTANT

## 2025-03-04 PROCEDURE — 2500000004 HC RX 250 GENERAL PHARMACY W/ HCPCS (ALT 636 FOR OP/ED): Performed by: ORTHOPAEDIC SURGERY

## 2025-03-04 PROCEDURE — C1713 ANCHOR/SCREW BN/BN,TIS/BN: HCPCS | Performed by: ORTHOPAEDIC SURGERY

## 2025-03-04 PROCEDURE — 2720000007 HC OR 272 NO HCPCS: Performed by: ORTHOPAEDIC SURGERY

## 2025-03-04 PROCEDURE — 2500000004 HC RX 250 GENERAL PHARMACY W/ HCPCS (ALT 636 FOR OP/ED): Performed by: ANESTHESIOLOGIST ASSISTANT

## 2025-03-04 PROCEDURE — 23334 SHOULDER PROSTHESIS REMOVAL: CPT | Performed by: ORTHOPAEDIC SURGERY

## 2025-03-04 PROCEDURE — 87102 FUNGUS ISOLATION CULTURE: CPT | Mod: AHULAB | Performed by: ORTHOPAEDIC SURGERY

## 2025-03-04 PROCEDURE — 3700000002 HC GENERAL ANESTHESIA TIME - EACH INCREMENTAL 1 MINUTE: Performed by: ORTHOPAEDIC SURGERY

## 2025-03-04 PROCEDURE — 11981 INSERTION DRUG DLVR IMPLANT: CPT | Performed by: ORTHOPAEDIC SURGERY

## 2025-03-04 PROCEDURE — 87070 CULTURE OTHR SPECIMN AEROBIC: CPT | Performed by: INTERNAL MEDICINE

## 2025-03-04 PROCEDURE — 7100000001 HC RECOVERY ROOM TIME - INITIAL BASE CHARGE: Performed by: ORTHOPAEDIC SURGERY

## 2025-03-04 PROCEDURE — 2500000004 HC RX 250 GENERAL PHARMACY W/ HCPCS (ALT 636 FOR OP/ED): Performed by: PHYSICIAN ASSISTANT

## 2025-03-04 PROCEDURE — 96361 HYDRATE IV INFUSION ADD-ON: CPT | Mod: 59

## 2025-03-04 PROCEDURE — 87205 SMEAR GRAM STAIN: CPT | Performed by: INTERNAL MEDICINE

## 2025-03-04 PROCEDURE — 3700000001 HC GENERAL ANESTHESIA TIME - INITIAL BASE CHARGE: Performed by: ORTHOPAEDIC SURGERY

## 2025-03-04 DEVICE — IMPLANTABLE DEVICE: Type: IMPLANTABLE DEVICE | Site: SHOULDER | Status: FUNCTIONAL

## 2025-03-04 DEVICE — IMPLANTABLE DEVICE
Type: IMPLANTABLE DEVICE | Site: SHOULDER | Status: FUNCTIONAL
Brand: REFOBACIN® BONE CEMENT R

## 2025-03-04 RX ORDER — ACETAMINOPHEN 325 MG/1
650 TABLET ORAL EVERY 4 HOURS PRN
Status: DISCONTINUED | OUTPATIENT
Start: 2025-03-04 | End: 2025-03-04 | Stop reason: HOSPADM

## 2025-03-04 RX ORDER — HYDROCODONE BITARTRATE AND ACETAMINOPHEN 5; 325 MG/1; MG/1
1 TABLET ORAL EVERY 4 HOURS PRN
Status: DISCONTINUED | OUTPATIENT
Start: 2025-03-04 | End: 2025-03-04 | Stop reason: HOSPADM

## 2025-03-04 RX ORDER — HYDROCODONE BITARTRATE AND ACETAMINOPHEN 5; 325 MG/1; MG/1
2 TABLET ORAL EVERY 4 HOURS PRN
Status: DISCONTINUED | OUTPATIENT
Start: 2025-03-04 | End: 2025-03-04 | Stop reason: HOSPADM

## 2025-03-04 RX ORDER — NALOXONE HYDROCHLORIDE 0.4 MG/ML
0.2 INJECTION, SOLUTION INTRAMUSCULAR; INTRAVENOUS; SUBCUTANEOUS EVERY 5 MIN PRN
Status: DISCONTINUED | OUTPATIENT
Start: 2025-03-04 | End: 2025-03-04 | Stop reason: HOSPADM

## 2025-03-04 RX ORDER — ONDANSETRON 4 MG/1
4 TABLET, ORALLY DISINTEGRATING ORAL EVERY 8 HOURS PRN
Status: DISCONTINUED | OUTPATIENT
Start: 2025-03-04 | End: 2025-03-07 | Stop reason: HOSPADM

## 2025-03-04 RX ORDER — VANCOMYCIN HYDROCHLORIDE 1 G/20ML
INJECTION, POWDER, LYOPHILIZED, FOR SOLUTION INTRAVENOUS AS NEEDED
Status: DISCONTINUED | OUTPATIENT
Start: 2025-03-04 | End: 2025-03-04 | Stop reason: HOSPADM

## 2025-03-04 RX ORDER — MIDAZOLAM HYDROCHLORIDE 1 MG/ML
INJECTION INTRAMUSCULAR; INTRAVENOUS AS NEEDED
Status: DISCONTINUED | OUTPATIENT
Start: 2025-03-04 | End: 2025-03-04

## 2025-03-04 RX ORDER — ACETAMINOPHEN 325 MG/1
650 TABLET ORAL EVERY 6 HOURS SCHEDULED
Status: DISCONTINUED | OUTPATIENT
Start: 2025-03-04 | End: 2025-03-07 | Stop reason: HOSPADM

## 2025-03-04 RX ORDER — ONDANSETRON HYDROCHLORIDE 2 MG/ML
4 INJECTION, SOLUTION INTRAVENOUS EVERY 8 HOURS PRN
Status: DISCONTINUED | OUTPATIENT
Start: 2025-03-04 | End: 2025-03-04 | Stop reason: HOSPADM

## 2025-03-04 RX ORDER — ONDANSETRON 4 MG/1
4 TABLET, ORALLY DISINTEGRATING ORAL EVERY 8 HOURS PRN
Status: DISCONTINUED | OUTPATIENT
Start: 2025-03-04 | End: 2025-03-04 | Stop reason: HOSPADM

## 2025-03-04 RX ORDER — ALBUTEROL SULFATE 0.83 MG/ML
2.5 SOLUTION RESPIRATORY (INHALATION) ONCE AS NEEDED
Status: DISCONTINUED | OUTPATIENT
Start: 2025-03-04 | End: 2025-03-04 | Stop reason: HOSPADM

## 2025-03-04 RX ORDER — BUPIVACAINE HCL/EPINEPHRINE 0.5-1:200K
VIAL (ML) INJECTION AS NEEDED
Status: DISCONTINUED | OUTPATIENT
Start: 2025-03-04 | End: 2025-03-04

## 2025-03-04 RX ORDER — DEXMEDETOMIDINE IN 0.9 % NACL 20 MCG/5ML
SYRINGE (ML) INTRAVENOUS AS NEEDED
Status: DISCONTINUED | OUTPATIENT
Start: 2025-03-04 | End: 2025-03-04

## 2025-03-04 RX ORDER — ONDANSETRON HYDROCHLORIDE 2 MG/ML
INJECTION, SOLUTION INTRAVENOUS AS NEEDED
Status: DISCONTINUED | OUTPATIENT
Start: 2025-03-04 | End: 2025-03-04

## 2025-03-04 RX ORDER — LIDOCAINE HYDROCHLORIDE 10 MG/ML
0.1 INJECTION, SOLUTION EPIDURAL; INFILTRATION; INTRACAUDAL; PERINEURAL ONCE
Status: DISCONTINUED | OUTPATIENT
Start: 2025-03-04 | End: 2025-03-04 | Stop reason: HOSPADM

## 2025-03-04 RX ORDER — IPRATROPIUM BROMIDE 0.5 MG/2.5ML
500 SOLUTION RESPIRATORY (INHALATION) ONCE
Status: DISCONTINUED | OUTPATIENT
Start: 2025-03-04 | End: 2025-03-04 | Stop reason: HOSPADM

## 2025-03-04 RX ORDER — DROPERIDOL 2.5 MG/ML
0.62 INJECTION, SOLUTION INTRAMUSCULAR; INTRAVENOUS ONCE AS NEEDED
Status: DISCONTINUED | OUTPATIENT
Start: 2025-03-04 | End: 2025-03-04 | Stop reason: HOSPADM

## 2025-03-04 RX ORDER — PROPOFOL 10 MG/ML
INJECTION, EMULSION INTRAVENOUS AS NEEDED
Status: DISCONTINUED | OUTPATIENT
Start: 2025-03-04 | End: 2025-03-04

## 2025-03-04 RX ORDER — MELOXICAM 7.5 MG/1
15 TABLET ORAL DAILY
Status: DISCONTINUED | OUTPATIENT
Start: 2025-03-04 | End: 2025-03-04 | Stop reason: HOSPADM

## 2025-03-04 RX ORDER — AMOXICILLIN 250 MG
2 CAPSULE ORAL 2 TIMES DAILY
Status: DISCONTINUED | OUTPATIENT
Start: 2025-03-04 | End: 2025-03-07 | Stop reason: HOSPADM

## 2025-03-04 RX ORDER — CEFAZOLIN SODIUM 2 G/100ML
2 INJECTION, SOLUTION INTRAVENOUS EVERY 8 HOURS
Status: DISCONTINUED | OUTPATIENT
Start: 2025-03-04 | End: 2025-03-04 | Stop reason: HOSPADM

## 2025-03-04 RX ORDER — ONDANSETRON HYDROCHLORIDE 2 MG/ML
4 INJECTION, SOLUTION INTRAVENOUS ONCE AS NEEDED
Status: DISCONTINUED | OUTPATIENT
Start: 2025-03-04 | End: 2025-03-04 | Stop reason: HOSPADM

## 2025-03-04 RX ORDER — FENTANYL CITRATE 50 UG/ML
INJECTION, SOLUTION INTRAMUSCULAR; INTRAVENOUS AS NEEDED
Status: DISCONTINUED | OUTPATIENT
Start: 2025-03-04 | End: 2025-03-04

## 2025-03-04 RX ORDER — SODIUM CHLORIDE, SODIUM LACTATE, POTASSIUM CHLORIDE, CALCIUM CHLORIDE 600; 310; 30; 20 MG/100ML; MG/100ML; MG/100ML; MG/100ML
50 INJECTION, SOLUTION INTRAVENOUS CONTINUOUS
Status: DISCONTINUED | OUTPATIENT
Start: 2025-03-04 | End: 2025-03-04 | Stop reason: HOSPADM

## 2025-03-04 RX ORDER — SODIUM CHLORIDE 0.9 G/100ML
INJECTION, SOLUTION IRRIGATION AS NEEDED
Status: DISCONTINUED | OUTPATIENT
Start: 2025-03-04 | End: 2025-03-04 | Stop reason: HOSPADM

## 2025-03-04 RX ORDER — ROCURONIUM BROMIDE 10 MG/ML
INJECTION, SOLUTION INTRAVENOUS AS NEEDED
Status: DISCONTINUED | OUTPATIENT
Start: 2025-03-04 | End: 2025-03-04

## 2025-03-04 RX ORDER — CEFAZOLIN 1 G/1
INJECTION, POWDER, FOR SOLUTION INTRAVENOUS AS NEEDED
Status: DISCONTINUED | OUTPATIENT
Start: 2025-03-04 | End: 2025-03-04

## 2025-03-04 RX ORDER — SODIUM CHLORIDE, SODIUM LACTATE, POTASSIUM CHLORIDE, CALCIUM CHLORIDE 600; 310; 30; 20 MG/100ML; MG/100ML; MG/100ML; MG/100ML
100 INJECTION, SOLUTION INTRAVENOUS CONTINUOUS
Status: DISCONTINUED | OUTPATIENT
Start: 2025-03-04 | End: 2025-03-04

## 2025-03-04 RX ORDER — HYDROCODONE BITARTRATE AND ACETAMINOPHEN 5; 325 MG/1; MG/1
2 TABLET ORAL EVERY 4 HOURS PRN
Status: DISCONTINUED | OUTPATIENT
Start: 2025-03-04 | End: 2025-03-07 | Stop reason: HOSPADM

## 2025-03-04 RX ORDER — ONDANSETRON HYDROCHLORIDE 2 MG/ML
4 INJECTION, SOLUTION INTRAVENOUS EVERY 8 HOURS PRN
Status: DISCONTINUED | OUTPATIENT
Start: 2025-03-04 | End: 2025-03-07 | Stop reason: HOSPADM

## 2025-03-04 RX ORDER — AMOXICILLIN 250 MG
2 CAPSULE ORAL 2 TIMES DAILY
Status: DISCONTINUED | OUTPATIENT
Start: 2025-03-04 | End: 2025-03-04 | Stop reason: HOSPADM

## 2025-03-04 RX ORDER — CEFAZOLIN SODIUM 2 G/100ML
2 INJECTION, SOLUTION INTRAVENOUS EVERY 8 HOURS
Status: COMPLETED | OUTPATIENT
Start: 2025-03-04 | End: 2025-03-05

## 2025-03-04 RX ORDER — NALOXONE HYDROCHLORIDE 0.4 MG/ML
0.2 INJECTION, SOLUTION INTRAMUSCULAR; INTRAVENOUS; SUBCUTANEOUS EVERY 5 MIN PRN
Status: DISCONTINUED | OUTPATIENT
Start: 2025-03-04 | End: 2025-03-07 | Stop reason: HOSPADM

## 2025-03-04 RX ORDER — TOBRAMYCIN 1.2 G/30ML
INJECTION, POWDER, LYOPHILIZED, FOR SOLUTION INTRAVENOUS AS NEEDED
Status: DISCONTINUED | OUTPATIENT
Start: 2025-03-04 | End: 2025-03-04 | Stop reason: HOSPADM

## 2025-03-04 RX ORDER — SODIUM CHLORIDE, SODIUM LACTATE, POTASSIUM CHLORIDE, CALCIUM CHLORIDE 600; 310; 30; 20 MG/100ML; MG/100ML; MG/100ML; MG/100ML
50 INJECTION, SOLUTION INTRAVENOUS CONTINUOUS
Status: DISCONTINUED | OUTPATIENT
Start: 2025-03-04 | End: 2025-03-05

## 2025-03-04 RX ORDER — HYDROCODONE BITARTRATE AND ACETAMINOPHEN 5; 325 MG/1; MG/1
1 TABLET ORAL EVERY 4 HOURS PRN
Status: DISCONTINUED | OUTPATIENT
Start: 2025-03-04 | End: 2025-03-07 | Stop reason: HOSPADM

## 2025-03-04 RX ORDER — SODIUM CHLORIDE, SODIUM LACTATE, POTASSIUM CHLORIDE, CALCIUM CHLORIDE 600; 310; 30; 20 MG/100ML; MG/100ML; MG/100ML; MG/100ML
INJECTION, SOLUTION INTRAVENOUS CONTINUOUS PRN
Status: DISCONTINUED | OUTPATIENT
Start: 2025-03-04 | End: 2025-03-04

## 2025-03-04 RX ORDER — ACETAMINOPHEN 325 MG/1
650 TABLET ORAL EVERY 6 HOURS SCHEDULED
Status: DISCONTINUED | OUTPATIENT
Start: 2025-03-04 | End: 2025-03-04 | Stop reason: HOSPADM

## 2025-03-04 RX ADMIN — SENNOSIDES AND DOCUSATE SODIUM 2 TABLET: 50; 8.6 TABLET ORAL at 20:57

## 2025-03-04 RX ADMIN — PROPOFOL 200 MG: 10 INJECTION, EMULSION INTRAVENOUS at 11:12

## 2025-03-04 RX ADMIN — Medication 20 MCG: at 10:12

## 2025-03-04 RX ADMIN — CEFAZOLIN SODIUM 2 G: 2 INJECTION, SOLUTION INTRAVENOUS at 20:57

## 2025-03-04 RX ADMIN — Medication 15 ML: at 10:12

## 2025-03-04 RX ADMIN — SUGAMMADEX 200 MG: 100 INJECTION, SOLUTION INTRAVENOUS at 13:33

## 2025-03-04 RX ADMIN — SODIUM CHLORIDE, POTASSIUM CHLORIDE, SODIUM LACTATE AND CALCIUM CHLORIDE 50 ML/HR: 600; 310; 30; 20 INJECTION, SOLUTION INTRAVENOUS at 17:09

## 2025-03-04 RX ADMIN — MIDAZOLAM HYDROCHLORIDE 2 MG: 1 INJECTION, SOLUTION INTRAMUSCULAR; INTRAVENOUS at 11:10

## 2025-03-04 RX ADMIN — SODIUM CHLORIDE, POTASSIUM CHLORIDE, SODIUM LACTATE AND CALCIUM CHLORIDE 100 ML/HR: 600; 310; 30; 20 INJECTION, SOLUTION INTRAVENOUS at 09:24

## 2025-03-04 RX ADMIN — ONDANSETRON 4 MG: 2 INJECTION, SOLUTION INTRAMUSCULAR; INTRAVENOUS at 12:59

## 2025-03-04 RX ADMIN — CEFAZOLIN 2 G: 330 INJECTION, POWDER, FOR SOLUTION INTRAMUSCULAR; INTRAVENOUS at 12:08

## 2025-03-04 RX ADMIN — ROCURONIUM BROMIDE 80 MG: 10 INJECTION, SOLUTION INTRAVENOUS at 11:12

## 2025-03-04 RX ADMIN — ROCURONIUM BROMIDE 20 MG: 10 INJECTION, SOLUTION INTRAVENOUS at 12:59

## 2025-03-04 RX ADMIN — SODIUM CHLORIDE, POTASSIUM CHLORIDE, SODIUM LACTATE AND CALCIUM CHLORIDE: 600; 310; 30; 20 INJECTION, SOLUTION INTRAVENOUS at 11:06

## 2025-03-04 RX ADMIN — FENTANYL CITRATE 100 MCG: 50 INJECTION, SOLUTION INTRAMUSCULAR; INTRAVENOUS at 11:12

## 2025-03-04 RX ADMIN — ACETAMINOPHEN 650 MG: 325 TABLET, FILM COATED ORAL at 17:08

## 2025-03-04 RX ADMIN — MIDAZOLAM HYDROCHLORIDE 2 MG: 1 INJECTION, SOLUTION INTRAMUSCULAR; INTRAVENOUS at 10:08

## 2025-03-04 RX ADMIN — DEXAMETHASONE SODIUM PHOSPHATE 4 MG: 4 INJECTION, SOLUTION INTRAMUSCULAR; INTRAVENOUS at 12:59

## 2025-03-04 RX ADMIN — ACETAMINOPHEN 650 MG: 325 TABLET, FILM COATED ORAL at 23:06

## 2025-03-04 RX ADMIN — DEXAMETHASONE SODIUM PHOSPHATE 2 MG: 4 INJECTION, SOLUTION INTRAMUSCULAR; INTRAVENOUS at 10:12

## 2025-03-04 SDOH — ECONOMIC STABILITY: FOOD INSECURITY: WITHIN THE PAST 12 MONTHS, THE FOOD YOU BOUGHT JUST DIDN'T LAST AND YOU DIDN'T HAVE MONEY TO GET MORE.: NEVER TRUE

## 2025-03-04 SDOH — SOCIAL STABILITY: SOCIAL INSECURITY
WITHIN THE LAST YEAR, HAVE YOU BEEN RAPED OR FORCED TO HAVE ANY KIND OF SEXUAL ACTIVITY BY YOUR PARTNER OR EX-PARTNER?: NO

## 2025-03-04 SDOH — ECONOMIC STABILITY: HOUSING INSECURITY: AT ANY TIME IN THE PAST 12 MONTHS, WERE YOU HOMELESS OR LIVING IN A SHELTER (INCLUDING NOW)?: NO

## 2025-03-04 SDOH — SOCIAL STABILITY: SOCIAL INSECURITY: HAVE YOU HAD ANY THOUGHTS OF HARMING ANYONE ELSE?: NO

## 2025-03-04 SDOH — SOCIAL STABILITY: SOCIAL INSECURITY: WITHIN THE LAST YEAR, HAVE YOU BEEN HUMILIATED OR EMOTIONALLY ABUSED IN OTHER WAYS BY YOUR PARTNER OR EX-PARTNER?: NO

## 2025-03-04 SDOH — SOCIAL STABILITY: SOCIAL INSECURITY: DO YOU FEEL UNSAFE GOING BACK TO THE PLACE WHERE YOU ARE LIVING?: NO

## 2025-03-04 SDOH — SOCIAL STABILITY: SOCIAL INSECURITY: WERE YOU ABLE TO COMPLETE ALL THE BEHAVIORAL HEALTH SCREENINGS?: YES

## 2025-03-04 SDOH — ECONOMIC STABILITY: HOUSING INSECURITY: IN THE PAST 12 MONTHS, HOW MANY TIMES HAVE YOU MOVED WHERE YOU WERE LIVING?: 0

## 2025-03-04 SDOH — ECONOMIC STABILITY: HOUSING INSECURITY: IN THE LAST 12 MONTHS, WAS THERE A TIME WHEN YOU WERE NOT ABLE TO PAY THE MORTGAGE OR RENT ON TIME?: NO

## 2025-03-04 SDOH — SOCIAL STABILITY: SOCIAL INSECURITY: ARE YOU OR HAVE YOU BEEN THREATENED OR ABUSED PHYSICALLY, EMOTIONALLY, OR SEXUALLY BY ANYONE?: NO

## 2025-03-04 SDOH — ECONOMIC STABILITY: FOOD INSECURITY: WITHIN THE PAST 12 MONTHS, YOU WORRIED THAT YOUR FOOD WOULD RUN OUT BEFORE YOU GOT THE MONEY TO BUY MORE.: NEVER TRUE

## 2025-03-04 SDOH — SOCIAL STABILITY: SOCIAL INSECURITY
WITHIN THE LAST YEAR, HAVE YOU BEEN KICKED, HIT, SLAPPED, OR OTHERWISE PHYSICALLY HURT BY YOUR PARTNER OR EX-PARTNER?: NO

## 2025-03-04 SDOH — SOCIAL STABILITY: SOCIAL INSECURITY: ARE THERE ANY APPARENT SIGNS OF INJURIES/BEHAVIORS THAT COULD BE RELATED TO ABUSE/NEGLECT?: NO

## 2025-03-04 SDOH — ECONOMIC STABILITY: INCOME INSECURITY: IN THE PAST 12 MONTHS HAS THE ELECTRIC, GAS, OIL, OR WATER COMPANY THREATENED TO SHUT OFF SERVICES IN YOUR HOME?: NO

## 2025-03-04 SDOH — SOCIAL STABILITY: SOCIAL INSECURITY: DO YOU FEEL ANYONE HAS EXPLOITED OR TAKEN ADVANTAGE OF YOU FINANCIALLY OR OF YOUR PERSONAL PROPERTY?: NO

## 2025-03-04 SDOH — SOCIAL STABILITY: SOCIAL INSECURITY: WITHIN THE LAST YEAR, HAVE YOU BEEN AFRAID OF YOUR PARTNER OR EX-PARTNER?: NO

## 2025-03-04 SDOH — SOCIAL STABILITY: SOCIAL INSECURITY: HAS ANYONE EVER THREATENED TO HURT YOUR FAMILY OR YOUR PETS?: NO

## 2025-03-04 SDOH — SOCIAL STABILITY: SOCIAL INSECURITY: DOES ANYONE TRY TO KEEP YOU FROM HAVING/CONTACTING OTHER FRIENDS OR DOING THINGS OUTSIDE YOUR HOME?: NO

## 2025-03-04 SDOH — ECONOMIC STABILITY: FOOD INSECURITY: HOW HARD IS IT FOR YOU TO PAY FOR THE VERY BASICS LIKE FOOD, HOUSING, MEDICAL CARE, AND HEATING?: NOT VERY HARD

## 2025-03-04 SDOH — SOCIAL STABILITY: SOCIAL INSECURITY: HAVE YOU HAD THOUGHTS OF HARMING ANYONE ELSE?: NO

## 2025-03-04 SDOH — SOCIAL STABILITY: SOCIAL INSECURITY: ABUSE: ADULT

## 2025-03-04 SDOH — ECONOMIC STABILITY: HOUSING INSECURITY: DO YOU FEEL UNSAFE GOING BACK TO THE PLACE WHERE YOU LIVE?: NO

## 2025-03-04 ASSESSMENT — COGNITIVE AND FUNCTIONAL STATUS - GENERAL
PERSONAL GROOMING: A LITTLE
TURNING FROM BACK TO SIDE WHILE IN FLAT BAD: A LITTLE
WALKING IN HOSPITAL ROOM: A LITTLE
DRESSING REGULAR UPPER BODY CLOTHING: A LITTLE
MOVING TO AND FROM BED TO CHAIR: A LITTLE
DRESSING REGULAR LOWER BODY CLOTHING: A LITTLE
TOILETING: A LITTLE
STANDING UP FROM CHAIR USING ARMS: A LITTLE
DRESSING REGULAR LOWER BODY CLOTHING: A LITTLE
MOVING FROM LYING ON BACK TO SITTING ON SIDE OF FLAT BED WITH BEDRAILS: A LITTLE
CLIMB 3 TO 5 STEPS WITH RAILING: A LITTLE
HELP NEEDED FOR BATHING: A LITTLE
MOVING FROM LYING ON BACK TO SITTING ON SIDE OF FLAT BED WITH BEDRAILS: A LITTLE
TOILETING: A LITTLE
MOBILITY SCORE: 18
MOBILITY SCORE: 19
DAILY ACTIVITIY SCORE: 19
DAILY ACTIVITIY SCORE: 19
TURNING FROM BACK TO SIDE WHILE IN FLAT BAD: A LITTLE
CLIMB 3 TO 5 STEPS WITH RAILING: A LITTLE
STANDING UP FROM CHAIR USING ARMS: A LITTLE
PATIENT BASELINE BEDBOUND: NO
HELP NEEDED FOR BATHING: A LITTLE
MOVING TO AND FROM BED TO CHAIR: A LITTLE
PERSONAL GROOMING: A LITTLE
DRESSING REGULAR UPPER BODY CLOTHING: A LITTLE

## 2025-03-04 ASSESSMENT — PAIN - FUNCTIONAL ASSESSMENT
PAIN_FUNCTIONAL_ASSESSMENT: 0-10

## 2025-03-04 ASSESSMENT — LIFESTYLE VARIABLES
AUDIT-C TOTAL SCORE: 1
HOW OFTEN DO YOU HAVE A DRINK CONTAINING ALCOHOL: MONTHLY OR LESS
PRESCIPTION_ABUSE_PAST_12_MONTHS: NO
AUDIT-C TOTAL SCORE: 1
HOW MANY STANDARD DRINKS CONTAINING ALCOHOL DO YOU HAVE ON A TYPICAL DAY: 1 OR 2
SUBSTANCE_ABUSE_PAST_12_MONTHS: NO
SKIP TO QUESTIONS 9-10: 1
HOW OFTEN DO YOU HAVE 6 OR MORE DRINKS ON ONE OCCASION: NEVER

## 2025-03-04 ASSESSMENT — PAIN SCALES - GENERAL
PAINLEVEL_OUTOF10: 0 - NO PAIN

## 2025-03-04 ASSESSMENT — ACTIVITIES OF DAILY LIVING (ADL)
HEARING - RIGHT EAR: FUNCTIONAL
ADEQUATE_TO_COMPLETE_ADL: YES
PATIENT'S MEMORY ADEQUATE TO SAFELY COMPLETE DAILY ACTIVITIES?: YES
BATHING: INDEPENDENT
GROOMING: INDEPENDENT
ASSISTIVE_DEVICE: SLING LUE
HEARING - LEFT EAR: FUNCTIONAL
LACK_OF_TRANSPORTATION: NO
LACK_OF_TRANSPORTATION: NO
FEEDING YOURSELF: INDEPENDENT
JUDGMENT_ADEQUATE_SAFELY_COMPLETE_DAILY_ACTIVITIES: YES
WALKS IN HOME: INDEPENDENT
DRESSING YOURSELF: INDEPENDENT
TOILETING: INDEPENDENT

## 2025-03-04 ASSESSMENT — PATIENT HEALTH QUESTIONNAIRE - PHQ9
SUM OF ALL RESPONSES TO PHQ9 QUESTIONS 1 & 2: 0
1. LITTLE INTEREST OR PLEASURE IN DOING THINGS: NOT AT ALL
2. FEELING DOWN, DEPRESSED OR HOPELESS: NOT AT ALL

## 2025-03-04 ASSESSMENT — COLUMBIA-SUICIDE SEVERITY RATING SCALE - C-SSRS
1. IN THE PAST MONTH, HAVE YOU WISHED YOU WERE DEAD OR WISHED YOU COULD GO TO SLEEP AND NOT WAKE UP?: NO
2. HAVE YOU ACTUALLY HAD ANY THOUGHTS OF KILLING YOURSELF?: NO
6. HAVE YOU EVER DONE ANYTHING, STARTED TO DO ANYTHING, OR PREPARED TO DO ANYTHING TO END YOUR LIFE?: NO

## 2025-03-04 NOTE — ANESTHESIA PREPROCEDURE EVALUATION
Pre-Anesthesia Evaluation      Ernesto Thurston is a 44 y.o. male who presents for the above mentioned procedure due to Infection associated with prosthesis of left shoulder joint (CMS-HCC) [T84.59XA, Z96.612]       Past Medical History:   Diagnosis Date    Asthma     ED (erectile dysfunction)     Hyperlipidemia     Infection of prosthetic shoulder joint (CMS-HCC)     Plan: Left Shoulder Open Debridement; Excision of Heterotopic Ossification; Removal of Prosthesis; Insertion of Antibiotic Spacer 3/4/25    Mass of skin of left shoulder     Obesity     Osteoarthritis of both hips resulting from hip dysplasia     Other conditions influencing health status     Chronic Pulmonary Embolism    PONV (postoperative nausea and vomiting)     Pulmonary embolism     Post-op PE s/p Embolectomy and IVC filter     Past Surgical History:   Procedure Laterality Date    ABCESS DRAINAGE Left 02/15/2023    shoulder    ABCESS DRAINAGE Left 11/13/2024    left shoulder, wound cultures    ANTERIOR CRUCIATE LIGAMENT REPAIR      EMBOLECTOMY      Pulmonary Artery Embolectomy    HERNIA REPAIR      MENISCECTOMY Left 12/08/2021    OTHER SURGICAL HISTORY      IVC filter placed    TOTAL SHOULDER ARTHROPLASTY Left 2014    VASECTOMY      WOUND EXPLORATION  09/02/2022    EXPLORATION LEFT SHOULDER, I&D     Social History   He reports that he has never smoked. He has never used smokeless tobacco. He reports that he does not currently use alcohol. He reports that he does not use drugs.    Allergies   Allergen Reactions    Percocet [Oxycodone-Acetaminophen] Nausea/vomiting     Current Outpatient Medications   Medication Instructions    chlorhexidine (Peridex) 0.12 % solution SWISH AND SPIT 15ML FOR 30 SECONDS NIGHT PRIOR TO SURGERY AND MORNING OF SURGERY    doxycycline (Vibramycin) 100 mg capsule     levoFLOXacin (LEVAQUIN) 750 mg, Daily    meloxicam (MOBIC) 15 mg, Daily     Lab Results   Component Value  "Date    STAPHMRSASCR No Staphylococcus aureus isolated 02/24/2025     Lab Results   Component Value Date    STAPHMRSASCR No Staphylococcus aureus isolated 02/24/2025     No results found for this or any previous visit (from the past 4464 hours).  No results found for this or any previous visit from the past 1095 days.     No results found for: \"EF\"  Visit Vitals  BP (!) 138/99   Pulse 74   Temp 36.7 °C (98.1 °F) (Temporal)   Resp 11   Ht 1.651 m (5' 5\")   Wt 90.1 kg (198 lb 10.2 oz)   SpO2 95%   BMI 33.05 kg/m²   Smoking Status Never   BSA 2.03 m²     No results found for: \"PREGTESTUR\", \"PREGSERUM\", \"HCG\", \"HCGQUANT\"                         Patient: Ernesto Thurston    Procedure Information       Date/Time: 03/04/25 1100    Procedure: Left Shoulder Open Debridement; Excision of Heterotopic Ossification; Removal of Prosthesis; Insertion of Antibiotic Spacer (Left: Shoulder)    Location: [facility] OR 18 / [facility] OR    Surgeons: Juanito Crain MD            Relevant Problems   Cardiac   (+) Hyperlipidemia      Pulmonary   (+) Asthma      Endocrine   (+) Obesity      ID   (+) Infection associated with prosthesis of left shoulder joint (CMS-[facility])       Clinical information reviewed:   Tobacco  Allergies  Meds   Med Hx  Surg Hx   Fam Hx  Soc Hx        NPO Detail:  NPO/Void Status  Carbohydrate Drink Given Prior to Surgery? : N  Date of Last Liquid: 03/04/25  Time of Last Liquid: 0800  Date of Last Solid: 03/03/25  Time of Last Solid: 1800  Last Intake Type: Clear fluids  Time of Last Void: 0730         Physical Exam    Airway  Mallampati: II     Cardiovascular   Rhythm: regular  Rate: normal     Dental    Pulmonary    Abdominal            Anesthesia Plan    History of general anesthesia?: yes  History of complications of general anesthesia?: no    ASA 2     general and regional     intravenous induction   Anesthetic plan and risks discussed with patient.    Plan discussed with CAA.      "

## 2025-03-04 NOTE — H&P
History Of Present Illness  Ernesto Thurston is a 44 y.o. male presenting with left shoulder arthroplasty with loosening, and possible infection.     Past Medical History  He has a past medical history of Asthma, ED (erectile dysfunction), Hyperlipidemia, Infection of prosthetic shoulder joint (CMS-[facility]), Mass of skin of left shoulder, Obesity, Osteoarthritis of both hips resulting from hip dysplasia, Other conditions influencing health status, PONV (postoperative nausea and vomiting), and Pulmonary embolism.    Surgical History  He has a past surgical history that includes Embolectomy; Total shoulder arthroplasty (Left, 2014); Hernia repair; Vasectomy; Anterior cruciate ligament repair; Meniscectomy (Left, 12/08/2021); Abscess drainage (Left, 02/15/2023); Abscess drainage (Left, 11/13/2024); Wound exploration (09/02/2022); and Other surgical history.     Social History  He reports that he has never smoked. He has never used smokeless tobacco. He reports that he does not currently use alcohol. He reports that he does not use drugs.    Family History  Family History   Problem Relation Name Age of Onset    Heart attack Father      Coronary artery disease Father          h/o CABG    Coronary artery disease Paternal Grandfather          Allergies  Patient has no known allergies.    Review of Systems     Physical Exam     Last Recorded Vitals  There were no vitals taken for this visit.    Relevant Results      Scheduled medications    Continuous medications    PRN medications    No results found for this or any previous visit (from the past 24 hours).    Assessment/Plan   Assessment & Plan  Infection associated with prosthesis of left shoulder joint (CMS-[facility])      Plan is for left shoulder open debridement excision of ossification excision and implant and placement of antibiotic spacer.       I spent 15 minutes in the professional and overall care of this patient.      Juanito Crain MD

## 2025-03-04 NOTE — ANESTHESIA POSTPROCEDURE EVALUATION
Patient: Ernesto Thurston    Procedure Summary       Date: 03/04/25 Room / Location: [facility] OR 18 / [facility] OR    Anesthesia Start: 1102 Anesthesia Stop: 1343    Procedure: Left Shoulder Open Debridement; Excision of Heterotopic Ossification; Removal of Prosthesis; Insertion of Antibiotic Spacer (Left: Shoulder) Diagnosis:       Infection associated with prosthesis of left shoulder joint (CMS-HCC)      (Infection associated with prosthesis of left shoulder joint (CMS-HCC) [T84.59XA, Z96.612])    Surgeons: Juanito Crain MD Responsible Provider: Marquis Mohr MD    Anesthesia Type: general, regional ASA Status: 2            Anesthesia Type: general, regional    Vitals Value Taken Time   /88 03/04/25 1345   Temp 36.4 03/04/25 1356   Pulse 81 03/04/25 1355   Resp 21 03/04/25 1355   SpO2 94 % 03/04/25 1355   Vitals shown include unfiled device data.    Anesthesia Post Evaluation    Patient location during evaluation: bedside  Patient participation: complete - patient participated  Level of consciousness: awake  Pain management: adequate  Airway patency: patent  Cardiovascular status: acceptable  Respiratory status: acceptable  Hydration status: acceptable  Postoperative Nausea and Vomiting: none        No notable events documented.

## 2025-03-04 NOTE — ANESTHESIA PROCEDURE NOTES
Peripheral Block    Patient location during procedure: pre-op  Start time: 3/4/2025 10:08 AM  End time: 3/4/2025 10:18 AM  Reason for block: procedure for pain, at surgeon's request and post-op pain management  Staffing  Performed: attending   Authorized by: Marquis Mohr MD    Performed by: Marquis Mohr MD  Preanesthetic Checklist  Completed: patient identified, IV checked, site marked, risks and benefits discussed, surgical consent, monitors and equipment checked, pre-op evaluation and timeout performed   Timeout performed at: 3/4/2025 10:08 AM  Peripheral Block  Patient position: sitting  Prep: alcohol swabs  Patient monitoring: continuous pulse ox  Block type: interscalene  Laterality: left  Injection technique: single-shot  Guidance: ultrasound guided  Local infiltration: lidocaine  Needle  Needle type: short-bevel   Needle gauge: 22 G  Needle length: 5 cm  Needle localization: ultrasound guidance     image stored in chart  Test dose: negative  Assessment  Injection assessment: negative aspiration for heme, no paresthesia on injection, incremental injection and local visualized surrounding nerve on ultrasound  Heart rate change: no  Slow fractionated injection: yes

## 2025-03-04 NOTE — ANESTHESIA PROCEDURE NOTES
Airway  Date/Time: 3/4/2025 11:14 AM  Urgency: elective    Airway not difficult    Staffing  Performed: MIKE   Authorized by: Marquis Mohr MD    Performed by: MIKE Man  Patient location during procedure: OR    Indications and Patient Condition  Indications for airway management: anesthesia  Spontaneous Ventilation: absent  Sedation level: deep  Preoxygenated: yes  Patient position: sniffing  Mask difficulty assessment: 1 - vent by mask    Final Airway Details  Final airway type: endotracheal airway      Successful airway: ETT  Cuffed: yes   Successful intubation technique: direct laryngoscopy  Endotracheal tube insertion site: oral  Blade: Colt  Blade size: #4  ETT size (mm): 7.5  Cormack-Lehane Classification: grade I - full view of glottis  Placement verified by: capnometry   Measured from: lips  ETT to lips (cm): 22  Number of attempts at approach: 1

## 2025-03-04 NOTE — CARE PLAN
POD0 s/p I&D, explant and placement of left shoulder antibiotic spacer.     FU cultures  FU ID recs.     Activity: sling until follow up.   Antibiotics: ancef until cultures return.   Diet: advance as tolerated  Dressing: change POD5  DVT Ppx: aspirin 81mg BID   Pain: multimodal  Weight Bearing: NWB LULEIGHA  Dispo: pending culture results and ID recs.   Follow Up: in two weeks from surgery with SHARON    Shaji Stoll MD  Sports Medicine Fellow  Orthopaedic Surgery

## 2025-03-04 NOTE — OP NOTE
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 (CMS-HCC) [T84.59XA, Z96.612]     Procedures  Left Shoulder Open Debridement; Excision of Heterotopic Ossification; Removal of Prosthesis; Insertion of Antibiotic Spacer  71456 - MS PROSTHESIS REMOVAL HUMERAL/GLENOID COMPONENT    MS INSERTION DRUG DELIVERY IMPLANT [19411]  Surgeons      * Juanito Carin - Primary    Resident/Fellow/Other Assistant:  Surgeons and Role:     * JOSEPH Rodriguez-CARMELO - SHARON First Assist    Staff:   Circulator: Diandra Bell Person: Lyly Espinosa Circulator: Jean Espinosa Scrub: Fely    Anesthesia Staff: Anesthesiologist: Marquis Mohr MD  C-AA: MIKE Man; MIKE Napier    Procedure Summary  Anesthesia: Anesthesia type not filed in the log.  ASA: II  Estimated Blood Loss: 40-50 mL  Intra-op Medications:   Administrations occurring from 1100 to 1330 on 25:   Medication Name Total Dose   sodium chloride 0.9 % irrigation solution 1,000 mL   vancomycin (Vancocin) vial for injection 2 g   tobramycin (Nebcin) injection 4,000 mg   ceFAZolin (Ancef) vial 1 g 2 g   dexAMETHasone (Decadron) 4 mg/mL 4 mg   fentaNYL (Sublimaze) injection 50 mcg/mL 100 mcg   LR infusion Cannot be calculated   midazolam PF (Versed) injection 1 mg/mL 2 mg   ondansetron (Zofran) 2 mg/mL injection 4 mg   propofol (Diprivan) injection 10 mg/mL 200 mg   rocuronium (ZeMuron) 50 mg/5 mL injection 100 mg              Anesthesia Record               Intraprocedure I/O Totals          Intake    LR infusion 1000.00 mL    Total  Intake 1000 mL          Specimen:   ID Type Source Tests Collected by Time   A : LEFT SHOULDER SUPERFICIAL Fluid ABSCESS AFB CULTURE/SMEAR, FUNGAL CULTURE/SMEAR Juanito Crain MD 3/4/2025 1142   B : LEFT SHOULDER BONE Fluid ABSCESS AFB CULTURE/SMEAR, FUNGAL CULTURE/SMEAR Juanito Crain MD 3/4/2025 1153   C : LEFT SHOULDER DEEP #1 Fluid ABSCESS AFB CULTURE/SMEAR, FUNGAL CULTURE/SMEAR Juanito Crain MD 3/4/2025 1154   D : LEFT SHOULDER DEEP #2 Fluid ABSCESS AFB CULTURE/SMEAR, FUNGAL CULTURE/SMEAR Juanito Crain MD 3/4/2025 1154   E : LEFT SHOULDER DEEP BONE Fluid ABSCESS AFB CULTURE/SMEAR, FUNGAL CULTURE/SMEAR Juanito Crain MD 3/4/2025 1205   F : LEFT SHOULDER EXUDATE Fluid ABSCESS AFB CULTURE/SMEAR, FUNGAL CULTURE/SMEAR Juanito Crain MD 3/4/2025 1229   G : LEFT HUMERAL CANAL Fluid ABSCESS AFB CULTURE/SMEAR, FUNGAL CULTURE/SMEAR Juanito Crain MD 3/4/2025 1231                 Drains and/or Catheters: * None in log *    Tourniquet Times:         Implants:  Implants       Type Name Action Serial No.      Joint CEMENT, BONE, REFOBACIN, 1 X 40 - SNA - [id number] Implanted NA     Joint MOLD, SHLDR, 8MM STEM [id number] HEAD - SN/A - [id number] Implanted N/A     Joint CEMENT, BONE, REFOBACIN, 1 X 40 - SN/A - [id number] Implanted N/A              Findings: Loose glenohumeral prosthesis incarcerated and heterotopic ossification.  Small amount of exudate deep within the crevices of the stem component.    Indications: Ernesto Thurston is an 44 y.o. 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  Patient has had loosening and infection around prosthesis left glenohumeral joint prosthetic replacement done approximately 10 years ago.  He has had recalcitrant recurrent infections adjacent to the left humerus and it is and there is concern that given the loosening of the prosthetic components of the extensive heterotopic  ossification and the recurrent nearby infection that the prosthetic components may be infected and infectious disease had recommended removal.  Patient was counseled about the options for treating this including potential risks possible benefits alternatives and rehabilitation sequence and the patient did request and schedule surgery provided informed consent obtained on the day of the procedure.    The patient was seen in the preoperative area. The risks, benefits, complications, treatment options, non-operative alternatives, expected recovery and outcomes were discussed with the patient. The possibilities of reaction to medication, pulmonary aspiration, injury to surrounding structures, bleeding, recurrent infection, the need for additional procedures, failure to diagnose a condition, and creating a complication requiring transfusion or operation were discussed with the patient. The patient concurred with the proposed plan, giving informed consent.  The site of surgery was properly noted/marked if necessary per policy. The patient has been actively warmed in preoperative area. Preoperative antibiotics . have been ordered and given within 1 hours of incision. Venous thrombosis prophylaxis .have been ordered including bilateral sequential compression devices    Procedure Details: Patient was transported to the operating room placed supine on the operating table timeout was called the marked site confirmed patient identification confirmed procedure reviewed allergies reviewed antibiotics were held.  General endotracheal anesthesia staff successfully.  The patient was moved to a seated position all bony prominences were carefully padded sequential compression devices were placed on the patient's legs for DVT prophylaxis the left upper extremity sterilely prepped with Betadine solution and sterilely draped incision was made with the pre-existing anterior scar brought through skin and subcutaneous tissues subcutaneous  flap was established medially and the deltopectoral interval was identified and opened.  The deltoid was adherent to the lying tissue including conjoined tendon and humeral bone.  A moderate amount of bursal tissue was encountered and a great deal of scar the scar was mobilized and the deltoid gradually mobilized off of the humerus careful identification of the conjoined tendon was performed and it was bluntly dissected free of the underlying bone.  And a retractor was placed to protect the axillary nerve through the remainder of the case.  After full mobilization of the deltoid and retraction, the heterotopic ossification was encountered and excision was undertaken using combination of rongeur and osteotomes a small window in the heterotopic bone which comprised the remnant of the rotator cuff, was opened and the prosthesis identified and then we carefully worked around the prosthesis excising heterotopic bone as we went with progressive internal rotation and external rotation of the humerus.  The glenoid component was identified to be displaced into the superior shoulder and was removed without difficulty in 1 piece.  The humeral component was then cleared of soft tissue circumferentially around the neck of the humerus and was removed easily and was quite loose.  Copious irrigation with Irrisept solution was undertaken.  Humerus was broached to receive a temporary antibiotic spacer.  During approach and excision of heterotopic ossification multiple cultures were obtained of superficial fluid and deep fluid exudate around the prosthesis and bone.  The polymethylmethacrylate temporary antibiotic spacer was prepared on the back table and then inserted into the humerus with additional cyst cement around the collar of the prosthesis to maintain version.  The irrigation was again performed and the deltopectoral interval was closed with absorbable Vicryl suture followed by closure of subcutaneous tissue and skin with  observable sutures and sterile dressing Mepilex type dressing was applied.  The patient's arm was placed in sling the patient was awakened and transferred to recovery room there were no complications.  Complications:  None; patient tolerated the procedure well.    Disposition: PACU - hemodynamically stable.  Condition: stable             Task Performed by SHARON First Assist or Physician Assistant:   JOSEPH Vernon PA/NP, was necessary to assist on this case due to the nature of the case and difficulty. During the case today served as my assist by first assistant      Additional Details: None    Attending Attestation:     Juanito Crain  Phone Number: [phone]

## 2025-03-05 ENCOUNTER — APPOINTMENT (OUTPATIENT)
Dept: RADIOLOGY | Facility: HOSPITAL | Age: 45
End: 2025-03-05
Payer: COMMERCIAL

## 2025-03-05 LAB
ALBUMIN SERPL BCP-MCNC: 3.9 G/DL (ref 3.4–5)
ALP SERPL-CCNC: 73 U/L (ref 33–120)
ALT SERPL W P-5'-P-CCNC: 25 U/L (ref 10–52)
ANION GAP SERPL CALC-SCNC: 10 MMOL/L (ref 10–20)
AST SERPL W P-5'-P-CCNC: 21 U/L (ref 9–39)
BASOPHILS # BLD AUTO: 0.01 X10*3/UL (ref 0–0.1)
BASOPHILS NFR BLD AUTO: 0.1 %
BILIRUB SERPL-MCNC: 0.8 MG/DL (ref 0–1.2)
BUN SERPL-MCNC: 12 MG/DL (ref 6–23)
CALCIUM SERPL-MCNC: 9 MG/DL (ref 8.6–10.3)
CHLORIDE SERPL-SCNC: 104 MMOL/L (ref 98–107)
CO2 SERPL-SCNC: 27 MMOL/L (ref 21–32)
CREAT SERPL-MCNC: 0.97 MG/DL (ref 0.5–1.3)
CRP SERPL-MCNC: 2.09 MG/DL
EGFRCR SERPLBLD CKD-EPI 2021: >90 ML/MIN/1.73M*2
EOSINOPHIL # BLD AUTO: 0.02 X10*3/UL (ref 0–0.7)
EOSINOPHIL NFR BLD AUTO: 0.2 %
ERYTHROCYTE [DISTWIDTH] IN BLOOD BY AUTOMATED COUNT: 13.4 % (ref 11.5–14.5)
GLUCOSE SERPL-MCNC: 109 MG/DL (ref 74–99)
HCT VFR BLD AUTO: 41.9 % (ref 41–52)
HGB BLD-MCNC: 14.5 G/DL (ref 13.5–17.5)
IMM GRANULOCYTES # BLD AUTO: 0.07 X10*3/UL (ref 0–0.7)
IMM GRANULOCYTES NFR BLD AUTO: 0.7 % (ref 0–0.9)
LYMPHOCYTES # BLD AUTO: 1.49 X10*3/UL (ref 1.2–4.8)
LYMPHOCYTES NFR BLD AUTO: 14.1 %
MCH RBC QN AUTO: 28 PG (ref 26–34)
MCHC RBC AUTO-ENTMCNC: 34.6 G/DL (ref 32–36)
MCV RBC AUTO: 81 FL (ref 80–100)
MONOCYTES # BLD AUTO: 1.04 X10*3/UL (ref 0.1–1)
MONOCYTES NFR BLD AUTO: 9.8 %
NEUTROPHILS # BLD AUTO: 7.95 X10*3/UL (ref 1.2–7.7)
NEUTROPHILS NFR BLD AUTO: 75.1 %
NRBC BLD-RTO: 0 /100 WBCS (ref 0–0)
PLATELET # BLD AUTO: 312 X10*3/UL (ref 150–450)
POTASSIUM SERPL-SCNC: 4 MMOL/L (ref 3.5–5.3)
PROT SERPL-MCNC: 6.6 G/DL (ref 6.4–8.2)
RBC # BLD AUTO: 5.18 X10*6/UL (ref 4.5–5.9)
SODIUM SERPL-SCNC: 137 MMOL/L (ref 136–145)
WBC # BLD AUTO: 10.6 X10*3/UL (ref 4.4–11.3)

## 2025-03-05 PROCEDURE — 85025 COMPLETE CBC W/AUTO DIFF WBC: CPT | Performed by: INTERNAL MEDICINE

## 2025-03-05 PROCEDURE — 7100000011 HC EXTENDED STAY RECOVERY HOURLY - NURSING UNIT

## 2025-03-05 PROCEDURE — 73200 CT UPPER EXTREMITY W/O DYE: CPT | Mod: LEFT SIDE | Performed by: RADIOLOGY

## 2025-03-05 PROCEDURE — 2500000004 HC RX 250 GENERAL PHARMACY W/ HCPCS (ALT 636 FOR OP/ED): Performed by: PHYSICIAN ASSISTANT

## 2025-03-05 PROCEDURE — 96375 TX/PRO/DX INJ NEW DRUG ADDON: CPT

## 2025-03-05 PROCEDURE — 97165 OT EVAL LOW COMPLEX 30 MIN: CPT | Mod: GO | Performed by: OCCUPATIONAL THERAPIST

## 2025-03-05 PROCEDURE — 97535 SELF CARE MNGMENT TRAINING: CPT | Mod: GO | Performed by: OCCUPATIONAL THERAPIST

## 2025-03-05 PROCEDURE — 73200 CT UPPER EXTREMITY W/O DYE: CPT | Mod: LT

## 2025-03-05 PROCEDURE — 80053 COMPREHEN METABOLIC PANEL: CPT | Performed by: INTERNAL MEDICINE

## 2025-03-05 PROCEDURE — 96367 TX/PROPH/DG ADDL SEQ IV INF: CPT | Mod: 59

## 2025-03-05 PROCEDURE — 2500000004 HC RX 250 GENERAL PHARMACY W/ HCPCS (ALT 636 FOR OP/ED): Performed by: INTERNAL MEDICINE

## 2025-03-05 PROCEDURE — 2500000001 HC RX 250 WO HCPCS SELF ADMINISTERED DRUGS (ALT 637 FOR MEDICARE OP): Performed by: PHYSICIAN ASSISTANT

## 2025-03-05 PROCEDURE — 96366 THER/PROPH/DIAG IV INF ADDON: CPT

## 2025-03-05 PROCEDURE — 36415 COLL VENOUS BLD VENIPUNCTURE: CPT | Performed by: INTERNAL MEDICINE

## 2025-03-05 PROCEDURE — 86140 C-REACTIVE PROTEIN: CPT | Performed by: INTERNAL MEDICINE

## 2025-03-05 RX ORDER — ERTAPENEM 1 G/1
1 INJECTION, POWDER, LYOPHILIZED, FOR SOLUTION INTRAMUSCULAR; INTRAVENOUS EVERY 24 HOURS
Status: DISCONTINUED | OUTPATIENT
Start: 2025-03-05 | End: 2025-03-07 | Stop reason: HOSPADM

## 2025-03-05 RX ORDER — MORPHINE SULFATE 2 MG/ML
2 INJECTION, SOLUTION INTRAMUSCULAR; INTRAVENOUS EVERY 4 HOURS PRN
Status: DISCONTINUED | OUTPATIENT
Start: 2025-03-05 | End: 2025-03-07 | Stop reason: HOSPADM

## 2025-03-05 RX ADMIN — ERTAPENEM SODIUM 1 G: 1 INJECTION, POWDER, LYOPHILIZED, FOR SOLUTION INTRAMUSCULAR; INTRAVENOUS at 08:52

## 2025-03-05 RX ADMIN — HYDROCODONE BITARTRATE AND ACETAMINOPHEN 2 TABLET: 5; 325 TABLET ORAL at 15:14

## 2025-03-05 RX ADMIN — ACETAMINOPHEN 650 MG: 325 TABLET, FILM COATED ORAL at 11:22

## 2025-03-05 RX ADMIN — MORPHINE SULFATE 2 MG: 2 INJECTION, SOLUTION INTRAMUSCULAR; INTRAVENOUS at 16:53

## 2025-03-05 RX ADMIN — HYDROCODONE BITARTRATE AND ACETAMINOPHEN 1 TABLET: 5; 325 TABLET ORAL at 11:22

## 2025-03-05 RX ADMIN — CEFAZOLIN SODIUM 2 G: 2 INJECTION, SOLUTION INTRAVENOUS at 04:01

## 2025-03-05 RX ADMIN — ACETAMINOPHEN 650 MG: 325 TABLET, FILM COATED ORAL at 05:30

## 2025-03-05 RX ADMIN — ACETAMINOPHEN 650 MG: 325 TABLET, FILM COATED ORAL at 17:27

## 2025-03-05 RX ADMIN — SENNOSIDES AND DOCUSATE SODIUM 2 TABLET: 50; 8.6 TABLET ORAL at 20:40

## 2025-03-05 RX ADMIN — HYDROCODONE BITARTRATE AND ACETAMINOPHEN 2 TABLET: 5; 325 TABLET ORAL at 20:39

## 2025-03-05 RX ADMIN — SENNOSIDES AND DOCUSATE SODIUM 2 TABLET: 50; 8.6 TABLET ORAL at 08:03

## 2025-03-05 ASSESSMENT — COGNITIVE AND FUNCTIONAL STATUS - GENERAL
TURNING FROM BACK TO SIDE WHILE IN FLAT BAD: A LITTLE
PERSONAL GROOMING: A LITTLE
MOVING FROM LYING ON BACK TO SITTING ON SIDE OF FLAT BED WITH BEDRAILS: A LITTLE
MOVING FROM LYING ON BACK TO SITTING ON SIDE OF FLAT BED WITH BEDRAILS: A LITTLE
DAILY ACTIVITIY SCORE: 20
WALKING IN HOSPITAL ROOM: A LITTLE
CLIMB 3 TO 5 STEPS WITH RAILING: A LITTLE
TURNING FROM BACK TO SIDE WHILE IN FLAT BAD: A LITTLE
DRESSING REGULAR UPPER BODY CLOTHING: A LITTLE
MOVING TO AND FROM BED TO CHAIR: A LITTLE
WALKING IN HOSPITAL ROOM: A LITTLE
DRESSING REGULAR UPPER BODY CLOTHING: A LOT
HELP NEEDED FOR BATHING: A LITTLE
MOVING TO AND FROM BED TO CHAIR: A LITTLE
STANDING UP FROM CHAIR USING ARMS: A LITTLE
HELP NEEDED FOR BATHING: A LITTLE
TOILETING: A LITTLE
DRESSING REGULAR LOWER BODY CLOTHING: A LITTLE
STANDING UP FROM CHAIR USING ARMS: A LITTLE
MOBILITY SCORE: 18
DRESSING REGULAR UPPER BODY CLOTHING: A LITTLE
CLIMB 3 TO 5 STEPS WITH RAILING: A LITTLE
DRESSING REGULAR LOWER BODY CLOTHING: A LITTLE
DRESSING REGULAR LOWER BODY CLOTHING: A LITTLE
HELP NEEDED FOR BATHING: A LITTLE
TOILETING: A LITTLE
MOBILITY SCORE: 18
PERSONAL GROOMING: A LITTLE
DAILY ACTIVITIY SCORE: 19
DAILY ACTIVITIY SCORE: 19

## 2025-03-05 ASSESSMENT — PAIN SCALES - GENERAL
PAINLEVEL_OUTOF10: 4
PAINLEVEL_OUTOF10: 0 - NO PAIN
PAINLEVEL_OUTOF10: 0 - NO PAIN
PAINLEVEL_OUTOF10: 5 - MODERATE PAIN
PAINLEVEL_OUTOF10: 7
PAINLEVEL_OUTOF10: 7
PAINLEVEL_OUTOF10: 4
PAINLEVEL_OUTOF10: 4
PAINLEVEL_OUTOF10: 7
PAINLEVEL_OUTOF10: 7

## 2025-03-05 ASSESSMENT — PAIN - FUNCTIONAL ASSESSMENT
PAIN_FUNCTIONAL_ASSESSMENT: 0-10

## 2025-03-05 ASSESSMENT — PAIN SCALES - WONG BAKER
WONGBAKER_NUMERICALRESPONSE: HURTS LITTLE BIT

## 2025-03-05 ASSESSMENT — ACTIVITIES OF DAILY LIVING (ADL)
ADL_ASSISTANCE: INDEPENDENT
LACK_OF_TRANSPORTATION: NO
HOME_MANAGEMENT_TIME_ENTRY: 14

## 2025-03-05 ASSESSMENT — PAIN DESCRIPTION - DESCRIPTORS
DESCRIPTORS: ACHING
DESCRIPTORS: ACHING

## 2025-03-05 NOTE — NURSING NOTE
Interdisciplinary team present: NP, PT, NM, CC, SW, Orthopedic Coordinator, and bedside RN.  Pain - controlled  Nausea - none  Discharge barrier - needs PICC  Discharge plan - home with IV abx x 6 weeks  Discharge date/time -  pending

## 2025-03-05 NOTE — PROGRESS NOTES
Occupational Therapy    Evaluation/Treatment    Patient Name: Ernesto Thurston  MRN: [id number]  Department: [facility]   Room: [address] [place] [address]  Today's Date: 03/05/25  Time Calculation  Start Time: 1043  Stop Time: 1117  Time Calculation (min): 34 min       Assessment:  OT Assessment: Pt presents to OT POD#1 L Shoulder Open Debridement; Excision of Heterotopic Ossification; Removal of Prosthesis; Insertion of Antibiotic Spacer (Left: Shoulder) and is near baseline level of functioning except requiring assist with UB/LB dressing d/t pain and ROM restriction. Pt's spouse able to assist upon D/C. Pt does not demo or verbalize any acute or post acute skilled OT needs. Will D/C from OT caseload.  Prognosis: Excellent  Barriers to Discharge Home: No anticipated barriers  Evaluation/Treatment Tolerance: Patient limited by pain  Medical Staff Made Aware: Yes  End of Session Communication: Bedside nurse  End of Session Patient Position: Up in chair, Alarm off, not on at start of session (RN approved no chair alarm, spouse in room)  Prognosis: Excellent  Barriers to Discharge: None  Evaluation/Treatment Tolerance: Patient limited by pain  Medical Staff Made Aware: Yes  Strengths: Premorbid level of function, Attitude of self, Ability to acquire knowledge, Support and attitude of living partners  Plan:  No Skilled OT: No acute OT goals identified  OT Frequency: OT eval only  OT Discharge Recommendations: No further acute OT, No OT needed after discharge  Equipment Recommended upon Discharge:  (TBD)  OT Recommended Transfer Status: Independent  OT - OK to Discharge: Yes       Subjective     General:   OT Received On: 03/05/25  General  Reason for Referral: Pt is a 45 y/o male POD#1 Left Shoulder Open Debridement; Excision of Heterotopic Ossification; Removal of Prosthesis; Insertion of Antibiotic Spacer (Left: Shoulder).  Referred By: Prem BILLS)  Past Medical History Relevant to Rehab:   Past Medical History:   Diagnosis Date    Asthma      ED (erectile dysfunction)     Hyperlipidemia     Infection of prosthetic shoulder joint (CMS-HCC)     Plan: Left Shoulder Open Debridement; Excision of Heterotopic Ossification; Removal of Prosthesis; Insertion of Antibiotic Spacer 3/4/25    Mass of skin of left shoulder     Obesity     Osteoarthritis of both hips resulting from hip dysplasia     Other conditions influencing health status     Chronic Pulmonary Embolism    PONV (postoperative nausea and vomiting)     Pulmonary embolism     Post-op PE s/p Embolectomy and IVC filter      Family/Caregiver Present: Yes  Caregiver Feedback: spouse present and very receptive/engaged  Prior to Session Communication: Bedside nurse  Patient Position Received: Bed, 3 rail up, Alarm off, not on at start of session  Preferred Learning Style: auditory, verbal, visual, written  General Comment: Pt supine in bed upon arrival and agreeable to OT Eval/tx. Pt fully participatory. Pt educated on L shoulder ROM restriction, distal ROM exercises, L UE NWB, sling at all times (remove for exercise/dressing/bathing), sponge bathing upon D/C, etc. Pt receptive to all education.   Precautions:  UE Weight Bearing Status: Left Non-Weight Bearing (in sling at all times (remove for dressing/sponge bathing/exercise))  Medical Precautions: Fall precautions  Post-Surgical Precautions: Left shoulder precautions (no shoulder ROM, distal ROM only)  Precautions Comment: sling adjusted for proper fit upon arrival,            Pain:  Pain Assessment  Pain Assessment: 0-10  0-10 (Numeric) Pain Score: 4  Pain Type: Surgical pain  Pain Location: Shoulder  Pain Orientation: Left  Pain Interventions: Repositioned, Ambulation/increased activity  Response to Interventions: No change in pain, Resting quietly (RN notified)    Objective   Cognition:  Overall Cognitive Status: Within Functional Limits  Orientation Level: Oriented X4  Attention: Within Functional Limits  Memory: Within Funtional Limits  Problem  Solving: Within Functional Limits  Numeric Reasoning: Within Functional Limits  Abstract Reasoning: Within Functional Limits  Safety/Judgement: Within Functional Limits  Insight: Within function limits  Impulsive: Within functional limits  Processing Speed: Within funtional limits           Home Living:  Type of Home: House  Lives With: Spouse  Home Adaptive Equipment: None  Home Layout: Two level, 1/2 bath on main level  Home Access: Stairs to enter without rails  Entrance Stairs-Rails: None  Entrance Stairs-Number of Steps: 2  Bathroom Shower/Tub: Tub/shower unit  Bathroom Toilet: Standard  Bathroom Equipment: None  Prior Function:  Level of [place]: Independent with ADLs and functional transfers, Independent with homemaking with ambulation  ADL Assistance: Independent  Homemaking Assistance: Independent  Ambulatory Assistance: Independent  Vocational: Full time employment  Hand Dominance: Right  Prior Function Comments: + driving       ADL:  Eating Assistance: Independent  Grooming Assistance: Independent  Grooming Deficit:  (pt performed standing hand hygiene at sink)  UE Dressing Assistance: Moderate  UE Dressing Deficit:  (to doff gown and don front opening garment, increased assist d/t pain and decreased R shoulder ROM)  LE Dressing Assistance: Minimal  LE Dressing Deficit:  (to don undergarment and pants)  Toileting Assistance with Device: Independent  Activities of Daily Living:      Grooming  Grooming Level of Assistance: Independent  Grooming Where Assessed: Standing sinkside  Grooming Comments: pt performed hand hygiene    UE Dressing  UE Dressing Level of Assistance: Moderate assistance, Minimal verbal cues  UE Dressing Where Assessed: Edge of bed  UE Dressing Comments: to don front opening garment and L shoulder sling, cues to adhere to shoulder ROM restriction    LE Dressing  LE Dressing: Yes  Pants Level of Assistance: Minimum assistance, Minimal verbal cues  LE Dressing Where Assessed: Edge of  bed  LE Dressing Comments: to don undergarment and shorts    Toileting  Toileting Level of Assistance: Independent  Where Assessed: Toilet  Toileting Comments: standing at toilet  Activity Tolerance:  Endurance: Endurance does not limit participation in activity       Bed Mobility/Transfers: Bed Mobility  Bed Mobility: Yes  Bed Mobility 1  Bed Mobility 1: Supine to sitting  Level of Assistance 1: Modified independent  Bed Mobility Comments 1: to R side, HOB minimally elevated    Transfers  Transfer: Yes  Transfer 1  Technique 1: Sit to stand, Stand to sit  Transfer Level of Assistance 1: Independent  Trials/Comments 1: at EOB         Functional Mobility:  Functional Mobility  Functional Mobility Performed: Yes  Functional Mobility 1  Comments 1: Pt functionally navigated x min household distance in room using no AD with distant SUP. No LOB noted.  Sitting Balance:  Static Sitting Balance  Static Sitting-Balance Support: Right upper extremity supported, Feet supported  Static Sitting-Level of Assistance: Independent  Static Sitting-Comment/Number of Minutes: at EOB  Dynamic Sitting Balance  Dynamic Sitting-Comments: Independent at EOB  Standing Balance:  Static Standing Balance  Static Standing-Balance Support: No upper extremity supported  Static Standing-Level of Assistance: Independent  Dynamic Standing Balance  Dynamic Standing-Comments: distant SUP    Therapy/Activity: Therapeutic Exercise  Therapeutic Exercise Performed: Yes  Therapeutic Exercise Activity 1: Pt instructed in x1 set of 10 L UE DISTAL AROM/AAROM exercises including elbow flexion/extension, forearm pronation/supination, wrist extension, digit flexion/extension, digit abduction/adduction and thumb/digit opposition/reposition. Pt demos mild pain with elbow flexion, cues to perform within pain free range. Instructed to perform x10 reps 4-5x/day. Pt receptive, verbalized understanding.    Sensation:  Sensation Comment: numbness/tingling in L UE d/t  nerve block  Strength:  Strength Comments: L UE not assessed d/t recent surgery, contraindicated; R shoulder 3-/5, distally 3/5 based on function       Coordination:  Movements are Fluid and Coordinated: Yes        Extremities: RUE   RUE : Exceptions to WFL (R shoulder flexion ~90 degrees AROM, distally WFL) and LUE   LUE: Exceptions to WFL (L shoulder not assess d/t recent surgery, contraindicated; distal AROM/AAROM WFL, elbow flexion mildly limited by pain)      Outcome Measures: [facility] Daily Activity  Putting on and taking off regular lower body clothing: A little  Bathing (including washing, rinsing, drying): A little  Putting on and taking off regular upper body clothing: A lot  Toileting, which includes using toilet, bedpan or urinal: None  Taking care of personal grooming such as brushing teeth: None  Eating Meals: None  Daily Activity - Total Score: 20        Education Documentation  Handouts, taught by Kate Jacques OT at 3/5/2025  1:48 PM.  Learner: Significant Other, Patient  Readiness: Acceptance  Method: Explanation  Response: Verbalizes Understanding    Body Mechanics, taught by Kate Jacques OT at 3/5/2025  1:48 PM.  Learner: Significant Other, Patient  Readiness: Acceptance  Method: Explanation  Response: Verbalizes Understanding    Precautions, taught by Kate Jacques OT at 3/5/2025  1:48 PM.  Learner: Significant Other, Patient  Readiness: Acceptance  Method: Explanation  Response: Verbalizes Understanding    Home Exercise Program, taught by Kate Jacques OT at 3/5/2025  1:48 PM.  Learner: Significant Other, Patient  Readiness: Acceptance  Method: Explanation  Response: Verbalizes Understanding    ADL Training, taught by Kate Jacques OT at 3/5/2025  1:48 PM.  Learner: Significant Other, Patient  Readiness: Acceptance  Method: Explanation  Response: Verbalizes Understanding    Education Comments  No comments found.

## 2025-03-05 NOTE — CONSULTS
"INFECTIOUS DISEASE INPATIENT INITIAL CONSULTATION    Referred By: Juanito Crain    Reason For Consult:  PICC/antibiotics    HPI:  This is a 44 y.o. male with PMH of asthma who presented with left shoulder PJI.    Patient was seeing ID at Dr. 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 Patient absent signs of septicemia as of 12/2/24. Given late relapse of infection with identical organisms following prior prolonged course of anti-infective therapy, there is strong suspicion for biofilm formation on indwelling prosthetic material. Following discussion with patient and with shared decision making, it was mutually agreed upon to proceed with treatment of infection with highly bioavailable oral antibiotic therapy in the form of Levofloxacin and Docycycline targeting pathogens recovered from OR 11/13/24. Following acute course of therapy, will plan chronic/indefinite oral suppressive antibiotic therapy pending any additional surgical management performed. If all indwelling prosthetic material is explanted and clinical suspicion for residual biofilm is low, could consider cautious trial of cessation of antimicrobial therapy in the future.     Plan:   -Start PO levofloxacin 750 mg daily. Plan for 42 day course  -Continue PO doxycycline 100 mg BID. Plan for 42 more days therapy  -Patient counseled on potential adverse events from antimicrobial " "therapy, counseled on appropriate use of antibiotic  -Patient counseled on return precautions  -Plan to monitor at least biweekly CBC w/ diff, BMP, CRP to monitor response to therapy  -After 42 days therapy, will plan to reassess and continue prolonged/indefinite course of oral suppressive therapy pending any additional management per Orthopedics  -Ortho follow up 2/24/25\"     Saw ID again 1/2025 and Levofloxacin/Doxycycline were extended. Decision ultimately made to resect the implant due to recurrent infections.    Went to OR on 3/4 for this. Cultures from OR pending. Is on clarisa-op Cefazolin. No fever here and WBC is normal.    Allergies:  Percocet [oxycodone-acetaminophen]     Vitals (Last 24 Hours):  Heart Rate:  [74-87]   Temp:  [36.3 °C (97.3 °F)-37.1 °C (98.7 °F)]   Resp:  [11-21]   BP: (100-138)/(64-99)   Height:  [165.1 cm (5' 5\")]   Weight:  [90.1 kg (198 lb 10.2 oz)]   SpO2:  [93 %-98 %]      PHYSICAL EXAM:  Gen - NAD  Heart - RRR  Lungs - clear bilaterally, no wh  Abd - soft, no ttp, BS present  MSK - left shoulder surgical dressings present  Skin - no rash    MEDS:    Current Facility-Administered Medications:     acetaminophen (Tylenol) tablet 650 mg, 650 mg, oral, q6h MISTY, Danita Amaro PA-C, 650 mg at 03/05/25 0530    HYDROcodone-acetaminophen (Norco) 5-325 mg per tablet 1 tablet, 1 tablet, oral, q4h PRN, Danita Amaro PA-C    HYDROcodone-acetaminophen (Norco) 5-325 mg per tablet 2 tablet, 2 tablet, oral, q4h PRN, Danita Amaro PA-C    naloxone (Narcan) injection 0.2 mg, 0.2 mg, intravenous, q5 min PRN, Danita Amaro PA-C    ondansetron ODT (Zofran-ODT) disintegrating tablet 4 mg, 4 mg, oral, q8h PRN **OR** ondansetron (Zofran) injection 4 mg, 4 mg, intravenous, q8h PRN, Danita Amaro PA-C    sennosides-docusate sodium (Clarisa-Colace) 8.6-50 mg per tablet 2 tablet, 2 tablet, oral, BID, Danita Amaro PA-C, 2 tablet at 03/04/25 2057     LABS:  Labs reviewed from 1/2025 CBC/diff, BMP, " CRP.    IMAGING:  X-Ray Left Shoulder 3/4  IMPRESSION:  Interval hardware explant with antibiotic spacer placement.      ASSESSMENT/PLAN:    Left Shoulder PJI - recurrent episodes with hx of Serratia and C. Acnes infection. S/p explant/spacer 3/4. OR cultures pending.    Will place on IV Ertapenem 1g Q24H for now. Awaiting micro data to determine home going IV abx regimen. Plan for 6 weeks with PICC line.    Monitoring for adverse effects of abx such as rash/itching/diarrhea.    Will follow. Thanks!    Giuseppe Knapp MD  [facility]  Office #[phone]

## 2025-03-05 NOTE — PROGRESS NOTES
"Ernesto Thurston is a 44 y.o. 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    Subjective   Patient seen at bedside.  Pain is well-managed he thinks that he still is having effect from his nerve block.  No complaints today going to the bathroom without issues.  Denies any shortness of breath or chest pain       Objective     Physical Exam  Cardiovascular:      Rate and Rhythm: Normal rate.      Pulses: Normal pulses.   Abdominal:      General: Abdomen is flat.   Skin:     General: Skin is warm.   Neurological:      Mental Status: He is alert.   Psychiatric:         Mood and Affect: Mood normal.         Last Recorded Vitals  Blood pressure (!) 138/91, pulse 77, temperature 37.1 °C (98.8 °F), temperature source Temporal, resp. rate 18, height 1.651 m (5' 5\"), weight 90.1 kg (198 lb 10.2 oz), SpO2 96%.  Intake/Output last 3 Shifts:  I/O last 3 completed shifts:  In: 2283.2 (25.3 mL/kg) [P.O.:360; I.V.:1723.2 (19.1 mL/kg); IV Piggyback:200]  Out: 1605 (17.8 mL/kg) [Urine:1580 (0.5 mL/kg/hr); Blood:25]  Weight: 90.1 kg     Relevant Results      Scheduled medications  acetaminophen, 650 mg, oral, q6h MISTY  ertapenem, 1 g, intravenous, q24h  sennosides-docusate sodium, 2 tablet, oral, BID      Continuous medications     PRN medications  PRN medications: HYDROcodone-acetaminophen, HYDROcodone-acetaminophen, naloxone, ondansetron ODT **OR** ondansetron  Results for orders placed or performed during the hospital encounter of 03/04/25 (from the past 24 hours)   CBC and Auto Differential   Result Value Ref Range    WBC 10.6 4.4 - 11.3 x10*3/uL    nRBC 0.0 0.0 - 0.0 /100 WBCs    RBC 5.18 4.50 - 5.90 x10*6/uL    Hemoglobin 14.5 13.5 - 17.5 g/dL    Hematocrit 41.9 41.0 - 52.0 %    MCV 81 80 - 100 fL    MCH 28.0 26.0 - 34.0 pg    MCHC 34.6 32.0 - 36.0 g/dL    RDW 13.4 11.5 - 14.5 %    Platelets 312 150 - 450 x10*3/uL    Neutrophils % 75.1 40.0 - 80.0 %    Immature " Granulocytes %, Automated 0.7 0.0 - 0.9 %    Lymphocytes % 14.1 13.0 - 44.0 %    Monocytes % 9.8 2.0 - 10.0 %    Eosinophils % 0.2 0.0 - 6.0 %    Basophils % 0.1 0.0 - 2.0 %    Neutrophils Absolute 7.95 (H) 1.20 - 7.70 x10*3/uL    Immature Granulocytes Absolute, Automated 0.07 0.00 - 0.70 x10*3/uL    Lymphocytes Absolute 1.49 1.20 - 4.80 x10*3/uL    Monocytes Absolute 1.04 (H) 0.10 - 1.00 x10*3/uL    Eosinophils Absolute 0.02 0.00 - 0.70 x10*3/uL    Basophils Absolute 0.01 0.00 - 0.10 x10*3/uL   Comprehensive metabolic panel   Result Value Ref Range    Glucose 109 (H) 74 - 99 mg/dL    Sodium 137 136 - 145 mmol/L    Potassium 4.0 3.5 - 5.3 mmol/L    Chloride 104 98 - 107 mmol/L    Bicarbonate 27 21 - 32 mmol/L    Anion Gap 10 10 - 20 mmol/L    Urea Nitrogen 12 6 - 23 mg/dL    Creatinine 0.97 0.50 - 1.30 mg/dL    eGFR >90 >60 mL/min/1.73m*2    Calcium 9.0 8.6 - 10.3 mg/dL    Albumin 3.9 3.4 - 5.0 g/dL    Alkaline Phosphatase 73 33 - 120 U/L    Total Protein 6.6 6.4 - 8.2 g/dL    AST 21 9 - 39 U/L    Bilirubin, Total 0.8 0.0 - 1.2 mg/dL    ALT 25 10 - 52 U/L   C-reactive protein   Result Value Ref Range    C-Reactive Protein 2.09 (H) <1.00 mg/dL                            Assessment/Plan   Assessment & Plan  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  Patient will get PICC line and set up for home antibiotic's.  Will send home with oxycodone 5 mg / 325 mg acetaminophen as well as stool softener that he may take as needed.  He will follow-up with our office next week.  While in the hospital he will get CT scan of the left shoulder for surgical planning purposes for new implant.       I spent 45 minutes in the professional and overall care of this patient.      Danita Amaro PA-C

## 2025-03-05 NOTE — PROGRESS NOTES
03/05/25 1141   Discharge Planning   Living Arrangements Spouse/significant other;Children   Support Systems Spouse/significant other;Children   Assistance Needed Independent prior to admission   Type of Residence Private residence   Number of Stairs to Enter Residence 2   Number of Stairs Within Residence 12   Do you have animals or pets at home? Yes   Type of Animals or Pets cat   Who is requesting discharge planning? Provider   Home or Post Acute Services In home services   Type of Home Care Services Home nursing visits   Expected Discharge Disposition Home H  (Home infusion)   Does the patient need discharge transport arranged? No  (Wife will transport)   Financial Resource Strain   How hard is it for you to pay for the very basics like food, housing, medical care, and heating? Not hard   Housing Stability   In the last 12 months, was there a time when you were not able to pay the mortgage or rent on time? N   In the past 12 months, how many times have you moved where you were living? 0   At any time in the past 12 months, were you homeless or living in a shelter (including now)? N   Transportation Needs   In the past 12 months, has lack of transportation kept you from medical appointments or from getting medications? no   In the past 12 months, has lack of transportation kept you from meetings, work, or from getting things needed for daily living? No   Patient Choice   Provider Choice list and CMS website (https://medicare.gov/care-compare#search) for post-acute Quality and Resource Measure Data were provided and reviewed with: Patient;Family   Patient / Family choosing to utilize agency / facility established prior to hospitalization No   Stroke Family Assessment   Stroke Family Assessment Needed No   Intensity of Service   Intensity of Service 0-30 min     Met with patient and his wife at the bedside to discuss discharge plan.  He will need 6 weeks home IV antibiotics.  Discussed homecare options and  decision to place referral to [facility].  Waiting to see if they can accept patient.  PLAN/BARRIER: ID final recs, culture results, SOC for home infusion, PICC line  DISP: home  HHC: to be determined  DME: none  O2: none  WOUNDS: surgical.  TCC to follow for discharge plan.  Candy Hatfield RN

## 2025-03-05 NOTE — PROGRESS NOTES
Physical Therapy                 Therapy Communication Note    Patient Name: Ernesto Thurston  MRN: [id number]  Department: [place] [id number]  Room: [address] [place] [address]  Today's Date: 3/5/2025     Discipline: Physical Therapy    Completion of this session, clinical decision making, and documentation performed under the supervision of Patito Baez PT DPT             Missed Visit Reason: Missed Visit Reason:  (Pt has no acute therapy needs at this time; communicated and confirmed with OT)    Missed Time: screen    Comment:

## 2025-03-05 NOTE — CARE PLAN
The patient's goals for the shift include      The clinical goals for the shift include pain management      Problem: Pain - Adult  Goal: Verbalizes/displays adequate comfort level or baseline comfort level  Outcome: Progressing     Problem: Safety - Adult  Goal: Free from fall injury  Outcome: Progressing     Problem: Discharge Planning  Goal: Discharge to home or other facility with appropriate resources  Outcome: Progressing     Problem: Chronic Conditions and Co-morbidities  Goal: Patient's chronic conditions and co-morbidity symptoms are monitored and maintained or improved  Outcome: Progressing     Problem: Nutrition  Goal: Nutrient intake appropriate for maintaining nutritional needs  Outcome: Progressing

## 2025-03-05 NOTE — PROGRESS NOTES
03/05/25 1141   [facility] Disability Status   Are you deaf or do you have serious difficulty hearing? N   Are you blind or do you have serious difficulty seeing, even when wearing glasses? N   Because of a physical, mental, or emotional condition, do you have serious difficulty concentrating, remembering, or making decisions? (5 years old or older) N   Do you have serious difficulty walking or climbing stairs? N   Do you have serious difficulty dressing or bathing? N   Because of a physical, mental, or emotional condition, do you have serious difficulty doing errands alone such as visiting the doctor? N

## 2025-03-06 ENCOUNTER — APPOINTMENT (OUTPATIENT)
Dept: CARDIOLOGY | Facility: HOSPITAL | Age: 45
End: 2025-03-06
Payer: COMMERCIAL

## 2025-03-06 LAB
ACID FAST STN SPEC: NORMAL
INR PPP: 1.1 (ref 0.9–1.1)
MYCOBACTERIUM SPEC CULT: NORMAL
PROTHROMBIN TIME: 11.7 SECONDS (ref 9.8–12.4)

## 2025-03-06 PROCEDURE — 2500000004 HC RX 250 GENERAL PHARMACY W/ HCPCS (ALT 636 FOR OP/ED): Performed by: INTERNAL MEDICINE

## 2025-03-06 PROCEDURE — 2780000003 HC OR 278 NO HCPCS

## 2025-03-06 PROCEDURE — 87070 CULTURE OTHR SPECIMN AEROBIC: CPT | Performed by: INTERNAL MEDICINE

## 2025-03-06 PROCEDURE — 85610 PROTHROMBIN TIME: CPT | Performed by: INTERNAL MEDICINE

## 2025-03-06 PROCEDURE — G0378 HOSPITAL OBSERVATION PER HR: HCPCS

## 2025-03-06 PROCEDURE — 36415 COLL VENOUS BLD VENIPUNCTURE: CPT | Performed by: INTERNAL MEDICINE

## 2025-03-06 PROCEDURE — 96376 TX/PRO/DX INJ SAME DRUG ADON: CPT

## 2025-03-06 PROCEDURE — 2720000007 HC OR 272 NO HCPCS

## 2025-03-06 PROCEDURE — 7100000011 HC EXTENDED STAY RECOVERY HOURLY - NURSING UNIT

## 2025-03-06 PROCEDURE — 36573 INSJ PICC RS&I 5 YR+: CPT

## 2025-03-06 PROCEDURE — 96365 THER/PROPH/DIAG IV INF INIT: CPT

## 2025-03-06 PROCEDURE — 87075 CULTR BACTERIA EXCEPT BLOOD: CPT | Mod: AHULAB | Performed by: INTERNAL MEDICINE

## 2025-03-06 PROCEDURE — C1751 CATH, INF, PER/CENT/MIDLINE: HCPCS

## 2025-03-06 PROCEDURE — 2500000001 HC RX 250 WO HCPCS SELF ADMINISTERED DRUGS (ALT 637 FOR MEDICARE OP): Performed by: PHYSICIAN ASSISTANT

## 2025-03-06 RX ORDER — LIDOCAINE HYDROCHLORIDE 10 MG/ML
5 INJECTION, SOLUTION EPIDURAL; INFILTRATION; INTRACAUDAL; PERINEURAL ONCE
Status: DISCONTINUED | OUTPATIENT
Start: 2025-03-06 | End: 2025-03-07 | Stop reason: HOSPADM

## 2025-03-06 RX ADMIN — SENNOSIDES AND DOCUSATE SODIUM 2 TABLET: 50; 8.6 TABLET ORAL at 08:41

## 2025-03-06 RX ADMIN — SENNOSIDES AND DOCUSATE SODIUM 2 TABLET: 50; 8.6 TABLET ORAL at 20:00

## 2025-03-06 RX ADMIN — ACETAMINOPHEN 650 MG: 325 TABLET, FILM COATED ORAL at 05:55

## 2025-03-06 RX ADMIN — ACETAMINOPHEN 650 MG: 325 TABLET, FILM COATED ORAL at 00:03

## 2025-03-06 RX ADMIN — ACETAMINOPHEN 650 MG: 325 TABLET, FILM COATED ORAL at 17:29

## 2025-03-06 RX ADMIN — HYDROCODONE BITARTRATE AND ACETAMINOPHEN 2 TABLET: 5; 325 TABLET ORAL at 05:55

## 2025-03-06 RX ADMIN — ERTAPENEM SODIUM 1 G: 1 INJECTION, POWDER, LYOPHILIZED, FOR SOLUTION INTRAMUSCULAR; INTRAVENOUS at 08:40

## 2025-03-06 ASSESSMENT — PAIN SCALES - GENERAL
PAINLEVEL_OUTOF10: 2
PAINLEVEL_OUTOF10: 7
PAINLEVEL_OUTOF10: 4
PAINLEVEL_OUTOF10: 2

## 2025-03-06 ASSESSMENT — COGNITIVE AND FUNCTIONAL STATUS - GENERAL
TOILETING: A LITTLE
MOBILITY SCORE: 21
CLIMB 3 TO 5 STEPS WITH RAILING: A LITTLE
WALKING IN HOSPITAL ROOM: A LITTLE
HELP NEEDED FOR BATHING: A LITTLE
DAILY ACTIVITIY SCORE: 20
STANDING UP FROM CHAIR USING ARMS: A LITTLE
DRESSING REGULAR UPPER BODY CLOTHING: A LITTLE
DRESSING REGULAR LOWER BODY CLOTHING: A LITTLE

## 2025-03-06 ASSESSMENT — PAIN - FUNCTIONAL ASSESSMENT
PAIN_FUNCTIONAL_ASSESSMENT: 0-10

## 2025-03-06 ASSESSMENT — PAIN DESCRIPTION - DESCRIPTORS: DESCRIPTORS: ACHING

## 2025-03-06 ASSESSMENT — PAIN SCALES - WONG BAKER: WONGBAKER_NUMERICALRESPONSE: HURTS LITTLE BIT

## 2025-03-06 NOTE — CARE PLAN
Problem: Pain - Adult  Goal: Verbalizes/displays adequate comfort level or baseline comfort level  Outcome: Progressing     Problem: Safety - Adult  Goal: Free from fall injury  Outcome: Progressing     Problem: Discharge Planning  Goal: Discharge to home or other facility with appropriate resources  Outcome: Progressing     Problem: Chronic Conditions and Co-morbidities  Goal: Patient's chronic conditions and co-morbidity symptoms are monitored and maintained or improved  Outcome: Progressing     Problem: Nutrition  Goal: Nutrient intake appropriate for maintaining nutritional needs  Outcome: Progressing

## 2025-03-06 NOTE — PROGRESS NOTES
"  INFECTIOUS DISEASE DAILY PROGRESS NOTE    SUBJECTIVE:    No overnight events. No new complaints. Afebrile. No rash/itching/diarrhea.    Discussed PICC line risks/benefits (infection, blood clot, better chance to treat infection) and IV abx for home which patient is agreeable to.    OBJECTIVE:  VITALS (Last 24 Hours)  /84 (BP Location: Right arm, Patient Position: Sitting)   Pulse 74   Temp 37.2 °C (99 °F) (Temporal)   Resp 16   Ht 1.651 m (5' 5\")   Wt 90.1 kg (198 lb 10.2 oz)   SpO2 98%   BMI 33.05 kg/m²     PHYSICAL EXAM:  Gen - NAD  MSK - left shoulder surgical dressings present  Skin - no rash    ABX: IV Ertapenem    LABS:  Lab Results   Component Value Date    WBC 10.6 03/05/2025    HGB 14.5 03/05/2025    HCT 41.9 03/05/2025    MCV 81 03/05/2025     03/05/2025     Lab Results   Component Value Date    GLUCOSE 109 (H) 03/05/2025    CALCIUM 9.0 03/05/2025     03/05/2025    K 4.0 03/05/2025    CO2 27 03/05/2025     03/05/2025    BUN 12 03/05/2025    CREATININE 0.97 03/05/2025     Results from last 72 hours   Lab Units 03/05/25  0753   ALK PHOS U/L 73   BILIRUBIN TOTAL mg/dL 0.8   PROTEIN TOTAL g/dL 6.6   ALT U/L 25   AST U/L 21   ALBUMIN g/dL 3.9     Estimated Creatinine Clearance: 99.2 mL/min (by C-G formula based on SCr of 0.97 mg/dL).    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 S/p explant/spacer 3/4. OR cultures pending although only AFB and fungal samples were ordered.    I have called micro lab and we are working on adding on bacterial stain/cultures from OR specimens on 3/4. There were seeming 7 different samples taken and I ordered these to be added on with the separate labeled times take from OR instructions on the original samples so they can be matched up appropriately. There may have been 8 samples but it's difficult to tell in the results panel and 7 is plenty regardless.     IV Ertapenem 1g Q24H for now. " Awaiting micro data to determine home going IV abx regimen. Plan for 6 weeks with PICC line.     Monitoring for adverse effects of abx such as rash/itching/diarrhea.     Will follow. Thanks!    Giuseppe Knapp MD  [facility]  Office #[phone]

## 2025-03-06 NOTE — CARE PLAN
The patient's goals for the shift include  DC    The clinical goals for the shift include Maintain safety    Over the shift, the patient did not make progress toward the following goals. Barriers to progression include . Recommendations to address these barriers include .

## 2025-03-06 NOTE — PROGRESS NOTES
"Ernesto Thurston is a 44 y.o. 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    Subjective   Patient seen today sitting up in chair eating lunch.  Pain is well better managed today.  Denies any shortness of breath or chest pain eating and going to the bathroom without any issue       Objective     Physical Exam  Cardiovascular:      Rate and Rhythm: Normal rate.      Pulses: Normal pulses.   Pulmonary:      Effort: Pulmonary effort is normal.   Abdominal:      General: Abdomen is flat.   Neurological:      Mental Status: He is alert.   Psychiatric:         Mood and Affect: Mood normal.         Last Recorded Vitals  Blood pressure 116/70, pulse 83, temperature 36.8 °C (98.2 °F), temperature source Temporal, resp. rate 16, height 1.651 m (5' 5\"), weight 90.1 kg (198 lb 10.2 oz), SpO2 99%.  Intake/Output last 3 Shifts:  I/O last 3 completed shifts:  In: 1345.8 (14.9 mL/kg) [P.O.:540; I.V.:605.8 (6.7 mL/kg); IV Piggyback:200]  Out: 1275 (14.2 mL/kg) [Urine:1275 (0.4 mL/kg/hr)]  Weight: 90.1 kg     Relevant Results      Scheduled medications  acetaminophen, 650 mg, oral, q6h MISTY  ertapenem, 1 g, intravenous, q24h  lidocaine, 5 mL, infiltration, Once  sennosides-docusate sodium, 2 tablet, oral, BID      Continuous medications     PRN medications  PRN medications: alteplase, HYDROcodone-acetaminophen, HYDROcodone-acetaminophen, morphine, naloxone, ondansetron ODT **OR** ondansetron  No results found for this or any previous visit (from the past 24 hours).                         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  Discussed with infectious disease and we will get PICC line placement today or tomorrow.  Discharge is pending based on culture results.  Antibiotics will come " from infectious disease  Will be discharged with Vicodin 5/325 as well as Colace 100 mg twice daily  Follow-up with our office next week.    Patient did have CT scan while in the hospital for further surgical planning purposes       I spent 45 minutes in the professional and overall care of this patient.      Danita Amaro PA-C

## 2025-03-06 NOTE — NURSING NOTE
Interdisciplinary team present: NP, PT, NM, CC, SW, Orthopedic Coordinator, and bedside RN.  Pain - controlled  Nausea - none  Discharge barrier - needs PICC and final abx recs  Discharge plan - home with IV infusions  Discharge date/time -  pending

## 2025-03-07 ENCOUNTER — PHARMACY VISIT (OUTPATIENT)
Dept: PHARMACY | Facility: CLINIC | Age: 45
End: 2025-03-07
Payer: COMMERCIAL

## 2025-03-07 VITALS
TEMPERATURE: 97.5 F | HEART RATE: 77 BPM | WEIGHT: 198.63 LBS | DIASTOLIC BLOOD PRESSURE: 88 MMHG | OXYGEN SATURATION: 96 % | SYSTOLIC BLOOD PRESSURE: 141 MMHG | HEIGHT: 65 IN | BODY MASS INDEX: 33.09 KG/M2 | RESPIRATION RATE: 16 BRPM

## 2025-03-07 DIAGNOSIS — T84.59XA INFECTION ASSOCIATED WITH PROSTHESIS OF LEFT SHOULDER JOINT (CMS-HCC): ICD-10-CM

## 2025-03-07 DIAGNOSIS — Z96.612 INFECTION ASSOCIATED WITH PROSTHESIS OF LEFT SHOULDER JOINT (CMS-HCC): ICD-10-CM

## 2025-03-07 LAB
FUNGUS SPEC CULT: NORMAL
FUNGUS SPEC FUNGUS STN: NORMAL

## 2025-03-07 PROCEDURE — 36573 INSJ PICC RS&I 5 YR+: CPT

## 2025-03-07 PROCEDURE — 2500000004 HC RX 250 GENERAL PHARMACY W/ HCPCS (ALT 636 FOR OP/ED): Performed by: INTERNAL MEDICINE

## 2025-03-07 PROCEDURE — G0378 HOSPITAL OBSERVATION PER HR: HCPCS

## 2025-03-07 PROCEDURE — RXMED WILLOW AMBULATORY MEDICATION CHARGE

## 2025-03-07 PROCEDURE — 2500000001 HC RX 250 WO HCPCS SELF ADMINISTERED DRUGS (ALT 637 FOR MEDICARE OP): Performed by: PHYSICIAN ASSISTANT

## 2025-03-07 PROCEDURE — 96366 THER/PROPH/DIAG IV INF ADDON: CPT | Mod: 59

## 2025-03-07 RX ORDER — ERTAPENEM 1 G/1
1 INJECTION, POWDER, LYOPHILIZED, FOR SOLUTION INTRAMUSCULAR; INTRAVENOUS EVERY 24 HOURS
Qty: 1950 ML | Refills: 0 | Status: SHIPPED | OUTPATIENT
Start: 2025-03-07 | End: 2025-04-15

## 2025-03-07 RX ORDER — HYDROCODONE BITARTRATE AND ACETAMINOPHEN 5; 325 MG/1; MG/1
1 TABLET ORAL EVERY 6 HOURS PRN
Qty: 24 TABLET | Refills: 0 | Status: SHIPPED | OUTPATIENT
Start: 2025-03-07

## 2025-03-07 RX ORDER — DOCUSATE SODIUM 100 MG/1
100 CAPSULE, LIQUID FILLED ORAL 2 TIMES DAILY
Qty: 14 CAPSULE | Refills: 1 | Status: SHIPPED | OUTPATIENT
Start: 2025-03-07

## 2025-03-07 RX ADMIN — ACETAMINOPHEN 650 MG: 325 TABLET, FILM COATED ORAL at 08:41

## 2025-03-07 RX ADMIN — HYDROCODONE BITARTRATE AND ACETAMINOPHEN 2 TABLET: 5; 325 TABLET ORAL at 08:40

## 2025-03-07 RX ADMIN — ERTAPENEM SODIUM 1 G: 1 INJECTION, POWDER, LYOPHILIZED, FOR SOLUTION INTRAMUSCULAR; INTRAVENOUS at 08:45

## 2025-03-07 RX ADMIN — HYDROCODONE BITARTRATE AND ACETAMINOPHEN 2 TABLET: 5; 325 TABLET ORAL at 13:45

## 2025-03-07 RX ADMIN — ACETAMINOPHEN 650 MG: 325 TABLET, FILM COATED ORAL at 02:46

## 2025-03-07 ASSESSMENT — PAIN SCALES - GENERAL
PAINLEVEL_OUTOF10: 4
PAINLEVEL_OUTOF10: 1
PAINLEVEL_OUTOF10: 7
PAINLEVEL_OUTOF10: 2
PAINLEVEL_OUTOF10: 1
PAINLEVEL_OUTOF10: 8
PAINLEVEL_OUTOF10: 2

## 2025-03-07 ASSESSMENT — COGNITIVE AND FUNCTIONAL STATUS - GENERAL
STANDING UP FROM CHAIR USING ARMS: A LITTLE
HELP NEEDED FOR BATHING: A LITTLE
DAILY ACTIVITIY SCORE: 21
MOBILITY SCORE: 23
DRESSING REGULAR LOWER BODY CLOTHING: A LITTLE
DRESSING REGULAR UPPER BODY CLOTHING: A LITTLE

## 2025-03-07 ASSESSMENT — PAIN - FUNCTIONAL ASSESSMENT
PAIN_FUNCTIONAL_ASSESSMENT: 0-10

## 2025-03-07 ASSESSMENT — PAIN DESCRIPTION - ORIENTATION
ORIENTATION: LEFT
ORIENTATION: LEFT

## 2025-03-07 ASSESSMENT — PAIN DESCRIPTION - DESCRIPTORS
DESCRIPTORS: SORE
DESCRIPTORS: ACHING
DESCRIPTORS: ACHING

## 2025-03-07 ASSESSMENT — PAIN DESCRIPTION - LOCATION
LOCATION: SHOULDER

## 2025-03-07 NOTE — CARE PLAN
The patient's goals for the shift include      The clinical goals for the shift include Maintain safety    Over the shift, the patient did not make progress toward the following goals. Barriers to progression include . Recommendations to address these barriers include   Problem: Pain - Adult  Goal: Verbalizes/displays adequate comfort level or baseline comfort level  3/6/2025 2233 by Rosalee Amin RN  Outcome: Progressing  3/6/2025 2233 by Rosalee Amin RN  Outcome: Progressing     Problem: Safety - Adult  Goal: Free from fall injury  3/6/2025 2233 by Rosalee Amin RN  Outcome: Progressing  3/6/2025 2233 by Rosalee Amin RN  Outcome: Progressing     Problem: Discharge Planning  Goal: Discharge to home or other facility with appropriate resources  3/6/2025 2233 by Rosalee Amin RN  Outcome: Progressing  3/6/2025 2233 by Rosalee Amin RN  Outcome: Progressing     Problem: Chronic Conditions and Co-morbidities  Goal: Patient's chronic conditions and co-morbidity symptoms are monitored and maintained or improved  3/6/2025 2233 by Rosalee Amin RN  Outcome: Progressing  3/6/2025 2233 by Rosalee Amin RN  Outcome: Progressing     Problem: Nutrition  Goal: Nutrient intake appropriate for maintaining nutritional needs  3/6/2025 2233 by Rosalee Amin RN  Outcome: Progressing  3/6/2025 2233 by Rosalee Amin RN  Outcome: Progressing   .

## 2025-03-07 NOTE — PROGRESS NOTES
"  INFECTIOUS DISEASE DAILY PROGRESS NOTE    SUBJECTIVE:    No new events. PICC line planned today. OK for discharge - we discussed the pending cultures.    OBJECTIVE:  VITALS (Last 24 Hours)  BP (!) 143/98   Pulse 76   Temp 36.4 °C (97.5 °F)   Resp 16   Ht 1.651 m (5' 5\")   Wt 90.1 kg (198 lb 10.2 oz)   SpO2 95%   BMI 33.05 kg/m²     PHYSICAL EXAM:  Gen - NAD  MSK - left shoulder surgical dressings present  Skin - no rash    ABX: IV Ertapenem    LABS:  Lab Results   Component Value Date    WBC 10.6 03/05/2025    HGB 14.5 03/05/2025    HCT 41.9 03/05/2025    MCV 81 03/05/2025     03/05/2025     Lab Results   Component Value Date    GLUCOSE 109 (H) 03/05/2025    CALCIUM 9.0 03/05/2025     03/05/2025    K 4.0 03/05/2025    CO2 27 03/05/2025     03/05/2025    BUN 12 03/05/2025    CREATININE 0.97 03/05/2025     Results from last 72 hours   Lab Units 03/05/25  0753   ALK PHOS U/L 73   BILIRUBIN TOTAL mg/dL 0.8   PROTEIN TOTAL g/dL 6.6   ALT U/L 25   AST U/L 21   ALBUMIN g/dL 3.9     Estimated Creatinine Clearance: 99.2 mL/min (by C-G formula based on SCr of 0.97 mg/dL).    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 S/p explant/spacer 3/4. OR cultures pending although only AFB and fungal samples were ordered. I added on bacterial cultures to the original OR samples on 3/6 and these are pending now.    If something specific grows I will adjust abx as outpatient.    OK for PICC line placement - orders in place.    IV Ertapenem 1g Q24H plan through 4/15/25.    Once weekly labs CBC/diff, Creatinine, ESR, CRP fax to Dr. Knapp [phone].     Monitoring for adverse effects of abx such as rash/itching/diarrhea - none.    Patient has follow-up appt with me on: 4/15/25.  [facility] [address], [address]  Office # [phone]  Fax # [phone]    Will sign off. Please call back with questions. Thanks! D/w " case management    Giuseppe Knapp MD  [facility]  Office #[phone]

## 2025-03-07 NOTE — NURSING NOTE
Interdisciplinary team present: NP, PT, NM, CC, SW, Orthopedic Coordinator, and bedside RN.  Pain - controlled  Nausea - none  Discharge barrier - needs PICC line   Discharge plan - Home with [facility]  Discharge date/time - today after PICC placement

## 2025-03-07 NOTE — PROCEDURES
PICC Line Insertion Procedure Note    Procedure: Insertion of #4 FR/18G PICC    Indications:  Long Term IV therapy, Home IV therapy    Procedure Details   Informed consent was obtained for the procedure.   Maximum sterile technique was used including antiseptics, cap, gloves, gown, hand hygiene, mask, and sheet.    Sedation: No    #4 FR/18G PICC inserted to the R Basilic vein per hospital protocol.   Blood return:  yes    Findings:  Catheter was trimmed to 39 cm, with 0 cm exposed. Mid upper arm circumference is 32.5 cm. Catheter was flushed with 20 cc NS. Patient did tolerate procedure well.    Recommendations:  Single lumen PICC line inserted utilizing modified Seldinger technique under ultrasound guidance without difficulty or complications. Tip located in SVC using ECG technology, no chest xray required.   PICC Brochure given to patient with infection prevention teaching instruction.  Ok to use PICC line.      Vascular Access Team Procedure Note     Visit Date: 3/7/2025      Patient Name: Ernesto Thurston         MRN: [id number]             Procedure: PICC insertion      PICC - Adult 03/07/25 Single lumen Right Basilic vein (Active)   03/07/25 1019 Basilic vein   Earliest Known Present:    Placed by External Staff?:    Hand Hygiene Completed: Yes   Catheter Time Out Checklist Completed: Yes   Size (Fr): 4   Lumen Type: Single lumen   Description (optional):    Catheter to Vein Ratio Less Than 45%: Yes   Total Length (cm): 39 cm   External Length (cm): 0 cm   Orientation: Right   Site Prep: Chlorhexidine ;Usual sterile procedure followed   Local Anesthetic: Injectable   Indication: Parenteral medications   Insertion Team Members In The Room: Nurse   Initial Extremity Circumference (cm): 32.5 cm   Placed by: Sarah Serrano RN   Insertion attempts: 1   Patient Tolerance: Tolerated well   Comfort Measures: Positioning;Subcutaneous anesthetic   Procedure Location: Bedside   Safety Measures:    Estimated Blood Loss  (mL): 3 mL   Vessel Fully Compressible Proximally and Distally to Insertion Site: Yes   Brisk Blood Return Obtained and Line Draws Easily: Yes   Tip Location: Cavo-atrial juncture   Line Confirmation: ECG   Lot #: [id number]   : Bard   PICC Line Exp Date: 09/30/25   Number of Sutures Placed: 0   Post Procedure Checklist: Handoff with RN;Bed at lowest level and wheels locked;Obtain all new IV tubing prior to use;Line discharge information at bedside   Earliest Known Removed:    Removal Reason :    Removal Catheter Length (cm):    Catheter Tip Cultured:    Site Assessment Clean;Dry;Intact 03/07/25 1022   Proximal Lumen Status Blood return noted;Flushed;Normal saline locked 03/07/25 1022   Dressing Type Antimicrobial patch;Securing device 03/07/25 1022   Dressing Status Clean;Dry 03/07/25 1022                      Sarah Serrano RN  3/7/2025  10:24 AM

## 2025-03-07 NOTE — CARE PLAN
The patient's goals for the shift include      The clinical goals for the shift include Remain safe, HDS, have PICC line placed, and DC home by end of shift      Problem: Pain - Adult  Goal: Verbalizes/displays adequate comfort level or baseline comfort level  Outcome: Progressing     Problem: Safety - Adult  Goal: Free from fall injury  Outcome: Progressing     Problem: Discharge Planning  Goal: Discharge to home or other facility with appropriate resources  Outcome: Progressing     Problem: Chronic Conditions and Co-morbidities  Goal: Patient's chronic conditions and co-morbidity symptoms are monitored and maintained or improved  Outcome: Progressing     Problem: Nutrition  Goal: Nutrient intake appropriate for maintaining nutritional needs  Outcome: Progressing

## 2025-03-07 NOTE — PROGRESS NOTES
03/07/25 1038   Discharge Planning   Home or Post Acute Services In home services   Type of Home Care Services Home nursing visits   Expected Discharge Disposition Home H   Stroke Family Assessment   Stroke Family Assessment Needed No   Intensity of Service   Intensity of Service 0-30 min     Patient is medically cleared for discharge home.  His spouse will transport him.  [facility] to do onsite teaching at 1100 with patient and his wife.  [facility] will provide nursing care.  Updated AVS with homecare information.  DISP: home  HHC: [facility] and [facility]  DME: none  O2: none  WOUNDS: surgical  All information uploaded into careport for homecare and [facility] (Script, HCO, PICC note, MAR)  Candy Hatfield RN

## 2025-03-07 NOTE — CARE PLAN
The patient's goals for the shift include      The clinical goals for the shift include Maintain safety    Over the shift, the patient did not make progress toward the following goals. Barriers to progression include . Recommendations to address these barriers include   Problem: Pain - Adult  Goal: Verbalizes/displays adequate comfort level or baseline comfort level  Outcome: Progressing     Problem: Safety - Adult  Goal: Free from fall injury  Outcome: Progressing     Problem: Discharge Planning  Goal: Discharge to home or other facility with appropriate resources  Outcome: Progressing     Problem: Chronic Conditions and Co-morbidities  Goal: Patient's chronic conditions and co-morbidity symptoms are monitored and maintained or improved  Outcome: Progressing     Problem: Nutrition  Goal: Nutrient intake appropriate for maintaining nutritional needs  Outcome: Progressing   .

## 2025-03-08 LAB
BACTERIA SPEC CULT: NORMAL
GRAM STN SPEC: NORMAL
GRAM STN SPEC: NORMAL

## 2025-03-09 LAB
BACTERIA SPEC CULT: NORMAL
GRAM STN SPEC: NORMAL

## 2025-03-10 LAB
FUNGUS SPEC CULT: NORMAL
FUNGUS SPEC FUNGUS STN: NORMAL

## 2025-03-12 ENCOUNTER — OFFICE VISIT (OUTPATIENT)
Dept: ORTHOPEDIC SURGERY | Facility: HOSPITAL | Age: 45
End: 2025-03-12
Payer: COMMERCIAL

## 2025-03-12 VITALS — HEIGHT: 65 IN | WEIGHT: 195 LBS | BODY MASS INDEX: 32.49 KG/M2

## 2025-03-12 DIAGNOSIS — Z96.612 INFECTION ASSOCIATED WITH PROSTHESIS OF LEFT SHOULDER JOINT (CMS-HCC): Primary | ICD-10-CM

## 2025-03-12 DIAGNOSIS — T84.59XA INFECTION ASSOCIATED WITH PROSTHESIS OF LEFT SHOULDER JOINT (CMS-HCC): Primary | ICD-10-CM

## 2025-03-12 LAB
ACID FAST STN SPEC: NORMAL
MYCOBACTERIUM SPEC CULT: NORMAL

## 2025-03-12 PROCEDURE — 3008F BODY MASS INDEX DOCD: CPT | Performed by: PHYSICIAN ASSISTANT

## 2025-03-12 PROCEDURE — 99211 OFF/OP EST MAY X REQ PHY/QHP: CPT | Performed by: PHYSICIAN ASSISTANT

## 2025-03-12 PROCEDURE — 1036F TOBACCO NON-USER: CPT | Performed by: PHYSICIAN ASSISTANT

## 2025-03-12 ASSESSMENT — PAIN - FUNCTIONAL ASSESSMENT: PAIN_FUNCTIONAL_ASSESSMENT: 0-10

## 2025-03-12 ASSESSMENT — PAIN SCALES - GENERAL: PAINLEVEL_OUTOF10: 4

## 2025-03-12 NOTE — PROGRESS NOTES
Elias returns to clinic today for first postoperative visit after left shoulder implant removal and antibiotic spacer placement.  Overall he is doing well.  Has a PICC line of the right upper extremity.  Is being followed by infectious disease.  Getting weekly lab draws.  Having some mild/moderate pain taking occasional pain medication however has not taken it for a few days.  No other complaints today.    Examination: Surgical incision is healing well moderate anterior ecchymosis light sensation is intact  strength 5/5.  Palpable distal radial pulse.  No significant swelling.    Impression: Left shoulder infection    Plan: He will continue with IV antibiotics 6 weeks postoperatively through his PICC line.  He is followed by infectious disease with weekly lab draws.  He did obtain a CT scan in the hospital for surgical planning purposes we are in touch with the rep to get his new implant made.  He will continue with over-the-counter medication and occasional narcotic pain medication as needed.  He will follow-up with our office in 3 weeks.  I did reach out to infectious disease to make sure they are aware of positive cultures for P acnes.    Danita Amaro PA-C  Department of Orthopaedic Surgery  [facility]    Dictation performed with the use of voice recognition software. Syntax and grammatical errors may exist.

## 2025-03-13 LAB
BACTERIA SPEC CULT: NORMAL
GRAM STN SPEC: NORMAL
GRAM STN SPEC: NORMAL

## 2025-03-14 DIAGNOSIS — Z96.612 INFECTION ASSOCIATED WITH PROSTHESIS OF LEFT SHOULDER JOINT (CMS-HCC): ICD-10-CM

## 2025-03-14 DIAGNOSIS — T84.59XA INFECTION ASSOCIATED WITH PROSTHESIS OF LEFT SHOULDER JOINT (CMS-HCC): ICD-10-CM

## 2025-03-14 LAB
BACTERIA SPEC CULT: ABNORMAL
GRAM STN SPEC: ABNORMAL
GRAM STN SPEC: ABNORMAL

## 2025-03-16 LAB
BACTERIA SPEC CULT: ABNORMAL
GRAM STN SPEC: ABNORMAL
GRAM STN SPEC: ABNORMAL

## 2025-03-17 ENCOUNTER — EVALUATION (OUTPATIENT)
Dept: PHYSICAL THERAPY | Facility: CLINIC | Age: 45
End: 2025-03-17
Payer: COMMERCIAL

## 2025-03-17 DIAGNOSIS — Z96.619 INFECTION OF PROSTHETIC SHOULDER JOINT, SUBSEQUENT ENCOUNTER: ICD-10-CM

## 2025-03-17 DIAGNOSIS — T84.59XD INFECTION OF PROSTHETIC SHOULDER JOINT, SUBSEQUENT ENCOUNTER: ICD-10-CM

## 2025-03-17 DIAGNOSIS — M25.512 LEFT SHOULDER PAIN, UNSPECIFIED CHRONICITY: Primary | ICD-10-CM

## 2025-03-17 LAB
FUNGUS SPEC CULT: NORMAL
FUNGUS SPEC FUNGUS STN: NORMAL

## 2025-03-17 PROCEDURE — 97110 THERAPEUTIC EXERCISES: CPT | Mod: GP | Performed by: PHYSICAL THERAPIST

## 2025-03-17 PROCEDURE — 97161 PT EVAL LOW COMPLEX 20 MIN: CPT | Mod: GP | Performed by: PHYSICAL THERAPIST

## 2025-03-17 ASSESSMENT — PAIN SCALES - GENERAL: PAINLEVEL_OUTOF10: 1

## 2025-03-17 ASSESSMENT — PAIN DESCRIPTION - DESCRIPTORS: DESCRIPTORS: DULL;ACHING;SORE

## 2025-03-17 ASSESSMENT — PAIN - FUNCTIONAL ASSESSMENT: PAIN_FUNCTIONAL_ASSESSMENT: 0-10

## 2025-03-17 NOTE — PROGRESS NOTES
Physical Therapy    Physical Therapy Evaluation and Treatment      Patient Name: Ernesto Thurston  MRN: [id number]  Today's Date: 3/17/2025    Time Entry:   Time Calculation  Start Time: 1606  Stop Time: 1639  Time Calculation (min): 33 min  PT Evaluation Time Entry  PT Evaluation (Low) Time Entry: 23  PT Therapeutic Procedures Time Entry  Therapeutic Exercise Time Entry: 10                 Insurance:  [employer]  10% coins  BMN V  PA not req  Visit: 1  POC: 10      Assessment:  45 y/o M presents to outpatient physical therapy with reports of L shoulder pain s/p prosthesis removal and antibiotic spacer placement on 3/4/25. The patient presents with the current impairments of pain, decreased ROM, swelling, and weakness of the shoulder. These impairments currently limit their ability to reach for objects, lift, sleep, dress, bathe, and perform all work duties. Due to the limitations listed above, the patient is currently at a decreased functional level compared to baseline, and they would benefit from skilled physical therapy to improve pain intensity, flexibility, strength, and facilitate a safe and efficient return to functional baseline. Patient's prognosis for improvement with therapy is good at this time.  PT Assessment  PT Assessment Results: Decreased strength, Decreased range of motion, Pain  Rehab Prognosis: Good  Evaluation/Treatment Tolerance: Patient tolerated treatment well     Plan:  OP PT Plan  Treatment/Interventions: Cryotherapy, Education/ Instruction, Electrical stimulation, Manual therapy, Self care/ home management, Taping techniques, Neuromuscular re-education, Therapeutic activities, Therapeutic exercises, Vasopneumatic device  PT Plan: Skilled PT  PT Frequency:  (1-2x/week)  Duration: 9 additional visits  Onset Date: 03/04/25  Certification Period Start Date: 03/17/25  Certification Period End Date: 06/15/25  Rehab Potential: Good  Plan of Care Agreement: Patient      Complexity:  Low    Current Problem:    1. Left shoulder pain, unspecified chronicity        2. Infection of prosthetic shoulder joint, subsequent encounter  Referral to Physical Therapy          Subjective    Patient reports to OPP[facility] with complaints of L shoulder pain that has been worsening for the last few years. History of L shoulder replacement in 2014 or so. Exploratory surgery was performed a few times to try and help the infection, but eventually they did a implant removal and antibiotic spacer placed on 3/4/25. He was placed in a sling after surgery and told to avoid doing any lifting or anything with it. Pain hasn't been to bad since surgery, but he hasn't done much with the arm in general. Sleeping in a recliner. Denies numbness and tingling    Pain intensity: 1/10 at this time, 8/10 at worst, 1/10 at best    Aggravating factors: UE movements, sleeping    Relieving factors: ice    Patient goals: Maintain ROM and function prior to next surgery    General:  General  Reason for Referral: L shoulder pain s/p left shoulder implant removal and antibiotic spacer placement on 3/4/25  Referred By: Danita Amaro PA-C  Past Medical History Relevant to Rehab: OA, MED, L shoulder infection  Precautions:  Precautions  STEADI Fall Risk Score (The score of 4 or more indicates an increased risk of falling): 0  Precautions Comment: No strengthening. PROM/AAROM  Per MICHOACANO: ok for A/PROM as tolerated, no strengthening at this time  Pain:  Pain Assessment  Pain Assessment: 0-10  0-10 (Numeric) Pain Score: 1  Pain Location: Shoulder  Pain Orientation: Left  Pain Descriptors: Dull, Aching, Sore  Prior Level of Function:  Prior Function Per Pt/Caregiver Report  Prior Function Comments: IND in all ADLs and desired activities    Objective   Shoulder AROM (*indicates pain):  Flexion R 132, L NT  Abd R 101, L NT  IR R illiac crest, L NT  ER R occiput, L NT  (History of decreased ROM in multiple joints)    Shoulder PROM (*indicates pain):  Flexion R NT, L 82*  Scaption R  NT, L 76*  IR R NT, L stomach  ER R NT, L (10 deg abd) 11*    Cervical ROM (*indicates pain):   Flexion 66  Extension 56  RSB 38  LSB 31  Rrot 60  LROT 68    Shoulder strength (*indicates pain):  Flexion R NT/5, L NT/5  Abd  R NT/5, L NT/5  IR  R NT/5, L NT/5  ER R NT/5, L NT/5  (R side not tested this date due to PICC line)    Posture: fwd head, rounded shoulders    Dermatomes: intact    Palpation: mild tenderness surrounding the incision on the L    Observation: Incision appears dry and healing well without obvious signs of infection. Bandage strips remain at this time so full incision is not visualized        Outcome Measures:  Other Measures  Disability of Arm Shoulder Hand (DASH): 43; 72.73%     Treatments:  Therapeutic exercises:  PROM R shoulder within pain and tissue restrictions    EDUCATION:  Outpatient Education  Individual(s) Educated: Patient  Education Provided: Anatomy, Body Mechanics, Physiology, POC  Risk and Benefits Discussed with Patient/Caregiver/Other: yes  Patient/Caregiver Demonstrated Understanding: yes  Plan of Care Discussed and Agreed Upon: yes  Patient Response to Education: Patient/Caregiver Verbalized Understanding of Information, Patient/Caregiver Asked Appropriate Questions    Goals:  By discharge:  1. Patient will report and demonstrate independence with established HEP  2. Patient will report pain of 0/10 at rest, and no greater than 2/10 with any activity including reaching, lifting, dressing, and bathing  3. Patient will demonstrate passive shoulder ER >/= 30 deg, and elevation in the flexion and scaption planes >/= 130 deg to maintain functional ROM prior to next surgery   4. Patient will demonstrate a decrease in QuickDash score by 13 points to </=  30/55 to meet established MCID for the outcome measure (baseline 3/17/25, 43/55)  5. Patient will report the ability to sleep through the night 5/7 nights per week uninterrupted by pain to improve overall function throughout the  day  6. Patient will demonstrate AROM of the L shoulder >/= 120 deg of elevation and ER to C2 to indicate an improved ability to dress, bathe, and perform all other activities without restrictions   (Additional goals to be set if orthopedic restrictions changed prior to next surgery)

## 2025-03-19 ENCOUNTER — TREATMENT (OUTPATIENT)
Dept: PHYSICAL THERAPY | Facility: CLINIC | Age: 45
End: 2025-03-19
Payer: COMMERCIAL

## 2025-03-19 DIAGNOSIS — Z96.619 INFECTION OF PROSTHETIC SHOULDER JOINT, SUBSEQUENT ENCOUNTER: ICD-10-CM

## 2025-03-19 DIAGNOSIS — T84.59XD INFECTION OF PROSTHETIC SHOULDER JOINT, SUBSEQUENT ENCOUNTER: ICD-10-CM

## 2025-03-19 DIAGNOSIS — M25.512 LEFT SHOULDER PAIN, UNSPECIFIED CHRONICITY: Primary | ICD-10-CM

## 2025-03-19 LAB
ACID FAST STN SPEC: NORMAL
MYCOBACTERIUM SPEC CULT: NORMAL

## 2025-03-19 PROCEDURE — 97110 THERAPEUTIC EXERCISES: CPT | Mod: GP

## 2025-03-19 PROCEDURE — 97140 MANUAL THERAPY 1/> REGIONS: CPT | Mod: GP

## 2025-03-19 NOTE — PROGRESS NOTES
Physical Therapy     Physical Therapy Treatment     Patient Name: Ernesto Thurston  MRN: [id number]  Today's Date: 3/19/2025  Time Calculation  Start Time: 1559  Stop Time: 1645  Time Calculation (min): 46 min     Treatment Units:  TherEx: 1  TherAct:  Gait:  Neuro Re-ed:  Manual: 2  Modalities:        Insurance:  Visit: #2  Authorized: 10  Certification: 3/17/25 to 6/15/25  Payor: [employer] [facility] / Plan: [employer] / Product Type: *No Product type* /      Subjective:  Pt arrives with sling on L UE, reporting no changes.      Current pain: 2/10     Objective:  Pt cued for shoulder and trunk compensations during PROM. Focus of session was PROM to shoulder in all directions to end range, to decrease risk of loss of ROM.      Treatment:  Provided education regarding POC, goals created, reasoning for assessments performed and results of those assessments. Additionally, provided education regarding scheduling proposal with pt asked about scheduling preferences, call in/no show policy, and pt provides verbal confirmation of understanding.  PROM into the following while in supine to empty end feel: 35 min  Shoulder flex  Shoulder ext  IR/ER in 45 deg abd  Shoulder abd  IR/ER in 90 deg abd  Seated IR/ER to shoulder in 0deg abd   Pendulum with R hand on table: 4x30 sec     OP Education: HEP:   Access Code: [email or web address]  URL: [email or web address]  Date: 03/19/2025  Prepared by: Chele Uriostegui    Exercises  - Seated Scapular Retraction  - 3 x daily - 7 x weekly - 3 sets - 10 reps    Outcome Measure:        Diagnosis:  No diagnosis found.  Problem List Items Addressed This Visit    None    Goals:  By discharge:  1. Patient will report and demonstrate independence with established HEP  2. Patient will report pain of 0/10 at rest, and no greater than 2/10 with any activity including reaching, lifting, dressing, and bathing  3. Patient will demonstrate passive shoulder ER >/= 30 deg, and  elevation in the flexion and scaption planes >/= 130 deg to maintain functional ROM prior to next surgery   4. Patient will demonstrate a decrease in QuickDash score by 13 points to </=  30/55 to meet established MCID for the outcome measure (baseline 3/17/25, 43/55)  5. Patient will report the ability to sleep through the night 5/7 nights per week uninterrupted by pain to improve overall function throughout the day  6. Patient will demonstrate AROM of the L shoulder >/= 120 deg of elevation and ER to C2 to indicate an improved ability to dress, bathe, and perform all other activities without restrictions   (Additional goals to be set if orthopedic restrictions changed prior to next surgery)     Assessment:  Pt demonstrates significant ROM limitation secondary to pain, but resolves as expected in resting position. Pt tolerates PROM this session and introduced scapular retraction exercise to complete at home.      Plan:  Continue 2x/week until surgery, then follow up for ROM and strengthening s/p surgery.     Chele Uriostegui, PT   3/19/2025

## 2025-03-20 PROBLEM — M25.512 LEFT SHOULDER PAIN: Status: ACTIVE | Noted: 2025-03-20

## 2025-03-21 DIAGNOSIS — T84.59XA INFECTION ASSOCIATED WITH PROSTHESIS OF LEFT SHOULDER JOINT: ICD-10-CM

## 2025-03-21 DIAGNOSIS — Z96.612 INFECTION ASSOCIATED WITH PROSTHESIS OF LEFT SHOULDER JOINT: ICD-10-CM

## 2025-03-24 ENCOUNTER — TREATMENT (OUTPATIENT)
Dept: PHYSICAL THERAPY | Facility: CLINIC | Age: 45
End: 2025-03-24
Payer: COMMERCIAL

## 2025-03-24 DIAGNOSIS — T84.59XD INFECTION OF PROSTHETIC SHOULDER JOINT, SUBSEQUENT ENCOUNTER: ICD-10-CM

## 2025-03-24 DIAGNOSIS — Z96.619 INFECTION OF PROSTHETIC SHOULDER JOINT, SUBSEQUENT ENCOUNTER: ICD-10-CM

## 2025-03-24 DIAGNOSIS — M25.512 LEFT SHOULDER PAIN, UNSPECIFIED CHRONICITY: Primary | ICD-10-CM

## 2025-03-24 LAB
FUNGUS SPEC CULT: NORMAL
FUNGUS SPEC FUNGUS STN: NORMAL

## 2025-03-24 PROCEDURE — 97110 THERAPEUTIC EXERCISES: CPT | Mod: GP | Performed by: PHYSICAL THERAPIST

## 2025-03-24 ASSESSMENT — PAIN SCALES - GENERAL: PAINLEVEL_OUTOF10: 1

## 2025-03-24 ASSESSMENT — PAIN - FUNCTIONAL ASSESSMENT: PAIN_FUNCTIONAL_ASSESSMENT: 0-10

## 2025-03-24 NOTE — PROGRESS NOTES
Physical Therapy    Physical Therapy Treatment    Patient Name: Ernesto Thurston  MRN: [id number]  Today's Date: 3/24/2025    Time Entry:   Time Calculation  Start Time: 1601  Stop Time: 1641  Time Calculation (min): 40 min     PT Therapeutic Procedures Time Entry  Therapeutic Exercise Time Entry: 39                 Insurance:  [employer]  10% coins  BMN V  PA not req  Visit: 3  POC: 10    Assessment:  PT session focuses on improving PROM of the shoulder as tolerated. Patient demonstrates a good tolerance to all activities this date, without any reports of increased pain. Crepitus in the joint is noted near shoulder height in elevation, but no discomfort noted. He is progressing well, and remains a good candidate for additional skilled PT to reduce tension and stiffness prior to his next surgery.   PT Assessment  PT Assessment Results: Decreased strength, Decreased range of motion, Pain  Rehab Prognosis: Good  Plan:  OP PT Plan  PT Plan: Skilled PT  Rehab Potential: Good  Plan of Care Agreement: Patient  Continue to progress ROM of the shoulder as tolerated  Current Problem  1. Left shoulder pain, unspecified chronicity        2. Infection of prosthetic shoulder joint, subsequent encounter            General     General  Reason for Referral: L shoulder pain s/p left shoulder implant removal and antibiotic spacer placement on 3/4/25  Referred By: Danita Amaro PA-C  Past Medical History Relevant to Rehab: OA, MED, L shoulder infection    Subjective    Patient reports that his shoulder has been feeling good. Denies any significant pain, 1/10 at the start of session. He reports soreness after last session that lasted about a day, but nothing too bad.   Precautions  Precautions  STEADI Fall Risk Score (The score of 4 or more indicates an increased risk of falling): 0  Precautions Comment: No strengthening. PROM/AAROM  Pain  Pain Assessment  Pain Assessment: 0-10  0-10 (Numeric) Pain Score: 1  Pain Location: Shoulder  Pain  Orientation: Left    Objective   L shoulder PROM   ER (15 deg abd) 23  Treatments:  Therapeutic exercises:  PROM L shoulder within pain and tissue tolerance  Supine wand chest press x15    Units: 3    OP EDUCATION:       Goals:  By discharge:  1. Patient will report and demonstrate independence with established HEP  2. Patient will report pain of 0/10 at rest, and no greater than 2/10 with any activity including reaching, lifting, dressing, and bathing  3. Patient will demonstrate passive shoulder ER >/= 30 deg, and elevation in the flexion and scaption planes >/= 130 deg to maintain functional ROM prior to next surgery   4. Patient will demonstrate a decrease in QuickDash score by 13 points to </=  30/55 to meet established MCID for the outcome measure (baseline 3/17/25, 43/55)  5. Patient will report the ability to sleep through the night 5/7 nights per week uninterrupted by pain to improve overall function throughout the day  6. Patient will demonstrate AROM of the L shoulder >/= 120 deg of elevation and ER to C2 to indicate an improved ability to dress, bathe, and perform all other activities without restrictions   (Additional goals to be set if orthopedic restrictions changed prior to next surgery)

## 2025-03-26 ENCOUNTER — TREATMENT (OUTPATIENT)
Dept: PHYSICAL THERAPY | Facility: CLINIC | Age: 45
End: 2025-03-26
Payer: COMMERCIAL

## 2025-03-26 DIAGNOSIS — T84.59XD INFECTION OF PROSTHETIC SHOULDER JOINT, SUBSEQUENT ENCOUNTER: Primary | ICD-10-CM

## 2025-03-26 DIAGNOSIS — Z96.619 INFECTION OF PROSTHETIC SHOULDER JOINT, SUBSEQUENT ENCOUNTER: Primary | ICD-10-CM

## 2025-03-26 PROBLEM — T84.59XA INFECTION OF PROSTHETIC SHOULDER JOINT: Status: ACTIVE | Noted: 2025-03-26

## 2025-03-26 LAB
ACID FAST STN SPEC: NORMAL
MYCOBACTERIUM SPEC CULT: NORMAL

## 2025-03-26 PROCEDURE — 97110 THERAPEUTIC EXERCISES: CPT | Mod: GP

## 2025-03-26 ASSESSMENT — PAIN SCALES - GENERAL: PAINLEVEL_OUTOF10: 1

## 2025-03-26 ASSESSMENT — PAIN - FUNCTIONAL ASSESSMENT: PAIN_FUNCTIONAL_ASSESSMENT: 0-10

## 2025-03-26 NOTE — PROGRESS NOTES
Physical Therapy    Physical Therapy Treatment    Patient Name: Ernesto Thurston  MRN: [id number]  Today's Date: 3/26/2025    Time Entry:   Time Calculation  Start Time: 1446  Stop Time: 1529  Time Calculation (min): 43 min     PT Therapeutic Procedures Time Entry  Therapeutic Exercise Time Entry: 43                 Insurance:  [employer]  10% coins  BMN V  PA not req  Visit: 4  POC: 10    Assessment:  Session focused on PROM with joint mobs, distraction, self-PROM, and PROM. Pt demos improved pain tolerance with end range PROM.      Plan:     Continue to progress ROM of the shoulder as tolerated  Current Problem  No diagnosis found.      General  PT  Visit  PT Received On: 03/26/25  Response to Previous Treatment: Patient with no complaints from previous session.  General  Reason for Referral: L shoulder pain s/p left shoulder implant removal and antibiotic spacer placement on 3/4/25  Referred By: Danita Amaro PA-C  Past Medical History Relevant to Rehab: OA, MED, L shoulder infection    Subjective    Patient reports that his shoulder has been feeling good and no soreness after previous session. Pt reporting increased pain management after Monday's session as well.    Precautions     Pain  Pain Assessment  Pain Assessment: 0-10  0-10 (Numeric) Pain Score: 1    Objective   L shoulder PROM   ER (15 deg abd) 23    Treatments:  Therapeutic exercises:  Supine wand chest press x15  PROM L shoulder within pain and tissue tolerance  Grade 2-3 joint mob to L shoulder  Prayer stretch: [id number]    OP EDUCATION:       Goals:  By discharge:  1. Patient will report and demonstrate independence with established HEP  2. Patient will report pain of 0/10 at rest, and no greater than 2/10 with any activity including reaching, lifting, dressing, and bathing  3. Patient will demonstrate passive shoulder ER >/= 30 deg, and elevation in the flexion and scaption planes >/= 130 deg to maintain functional ROM prior to next surgery   4. Patient will  demonstrate a decrease in QuickDash score by 13 points to </=  30/55 to meet established MCID for the outcome measure (baseline 3/17/25, 43/55)  5. Patient will report the ability to sleep through the night 5/7 nights per week uninterrupted by pain to improve overall function throughout the day  6. Patient will demonstrate AROM of the L shoulder >/= 120 deg of elevation and ER to C2 to indicate an improved ability to dress, bathe, and perform all other activities without restrictions   (Additional goals to be set if orthopedic restrictions changed prior to next surgery)

## 2025-03-28 DIAGNOSIS — Z96.612 INFECTION ASSOCIATED WITH PROSTHESIS OF LEFT SHOULDER JOINT: ICD-10-CM

## 2025-03-28 DIAGNOSIS — T84.59XA INFECTION ASSOCIATED WITH PROSTHESIS OF LEFT SHOULDER JOINT: ICD-10-CM

## 2025-03-31 ENCOUNTER — APPOINTMENT (OUTPATIENT)
Dept: ORTHOPEDIC SURGERY | Facility: HOSPITAL | Age: 45
End: 2025-03-31
Payer: COMMERCIAL

## 2025-03-31 ENCOUNTER — HOSPITAL ENCOUNTER (EMERGENCY)
Facility: HOSPITAL | Age: 45
Discharge: OTHER INSTITUTION | DRG: 314 | End: 2025-03-31
Payer: COMMERCIAL

## 2025-03-31 ENCOUNTER — APPOINTMENT (OUTPATIENT)
Dept: CARDIOLOGY | Facility: HOSPITAL | Age: 45
End: 2025-03-31
Payer: COMMERCIAL

## 2025-03-31 ENCOUNTER — APPOINTMENT (OUTPATIENT)
Dept: RADIOLOGY | Facility: HOSPITAL | Age: 45
End: 2025-03-31
Payer: COMMERCIAL

## 2025-03-31 ENCOUNTER — HOSPITAL ENCOUNTER (INPATIENT)
Facility: HOSPITAL | Age: 45
LOS: 3 days | Discharge: HOME | End: 2025-04-03
Attending: EMERGENCY MEDICINE | Admitting: INTERNAL MEDICINE
Payer: COMMERCIAL

## 2025-03-31 DIAGNOSIS — M25.512 LEFT SHOULDER PAIN, UNSPECIFIED CHRONICITY: ICD-10-CM

## 2025-03-31 DIAGNOSIS — I26.99 OTHER PULMONARY EMBOLISM WITHOUT ACUTE COR PULMONALE: ICD-10-CM

## 2025-03-31 DIAGNOSIS — T84.59XA INFECTION OF PROSTHETIC SHOULDER JOINT, INITIAL ENCOUNTER: ICD-10-CM

## 2025-03-31 DIAGNOSIS — I26.93 SINGLE SUBSEGMENTAL PULMONARY EMBOLISM WITHOUT ACUTE COR PULMONALE: Primary | ICD-10-CM

## 2025-03-31 DIAGNOSIS — Z96.619 INFECTION OF PROSTHETIC SHOULDER JOINT, INITIAL ENCOUNTER: ICD-10-CM

## 2025-03-31 DIAGNOSIS — T82.898A OCCLUSION OF PERIPHERALLY INSERTED CENTRAL CATHETER (PICC) LINE, INITIAL ENCOUNTER: ICD-10-CM

## 2025-03-31 DIAGNOSIS — M79.89 OTHER SPECIFIED SOFT TISSUE DISORDERS: ICD-10-CM

## 2025-03-31 LAB
ALBUMIN SERPL BCP-MCNC: 4.3 G/DL (ref 3.4–5)
ALP SERPL-CCNC: 111 U/L (ref 33–120)
ALT SERPL W P-5'-P-CCNC: 94 U/L (ref 10–52)
ANION GAP SERPL CALC-SCNC: 13 MMOL/L (ref 10–20)
AST SERPL W P-5'-P-CCNC: 55 U/L (ref 9–39)
BASOPHILS # BLD AUTO: 0.02 X10*3/UL (ref 0–0.1)
BASOPHILS NFR BLD AUTO: 0.5 %
BILIRUB SERPL-MCNC: 0.7 MG/DL (ref 0–1.2)
BUN SERPL-MCNC: 15 MG/DL (ref 6–23)
CALCIUM SERPL-MCNC: 9.1 MG/DL (ref 8.6–10.3)
CARDIAC TROPONIN I PNL SERPL HS: 4 NG/L (ref 0–20)
CARDIAC TROPONIN I PNL SERPL HS: 5 NG/L (ref 0–20)
CHLORIDE SERPL-SCNC: 102 MMOL/L (ref 98–107)
CO2 SERPL-SCNC: 23 MMOL/L (ref 21–32)
CREAT SERPL-MCNC: 0.78 MG/DL (ref 0.5–1.3)
EGFRCR SERPLBLD CKD-EPI 2021: >90 ML/MIN/1.73M*2
EOSINOPHIL # BLD AUTO: 0.25 X10*3/UL (ref 0–0.7)
EOSINOPHIL NFR BLD AUTO: 6.2 %
ERYTHROCYTE [DISTWIDTH] IN BLOOD BY AUTOMATED COUNT: 13.2 % (ref 11.5–14.5)
FLUAV RNA RESP QL NAA+PROBE: NOT DETECTED
FLUBV RNA RESP QL NAA+PROBE: NOT DETECTED
GLUCOSE SERPL-MCNC: 107 MG/DL (ref 74–99)
HCT VFR BLD AUTO: 43.3 % (ref 41–52)
HGB BLD-MCNC: 14.8 G/DL (ref 13.5–17.5)
HOLD SPECIMEN: NORMAL
HOLD SPECIMEN: NORMAL
IMM GRANULOCYTES # BLD AUTO: 0.05 X10*3/UL (ref 0–0.7)
IMM GRANULOCYTES NFR BLD AUTO: 1.2 % (ref 0–0.9)
LACTATE SERPL-SCNC: 1.5 MMOL/L (ref 0.4–2)
LYMPHOCYTES # BLD AUTO: 0.59 X10*3/UL (ref 1.2–4.8)
LYMPHOCYTES NFR BLD AUTO: 14.7 %
MCH RBC QN AUTO: 27.7 PG (ref 26–34)
MCHC RBC AUTO-ENTMCNC: 34.2 G/DL (ref 32–36)
MCV RBC AUTO: 81 FL (ref 80–100)
MONOCYTES # BLD AUTO: 0.54 X10*3/UL (ref 0.1–1)
MONOCYTES NFR BLD AUTO: 13.5 %
NEUTROPHILS # BLD AUTO: 2.56 X10*3/UL (ref 1.2–7.7)
NEUTROPHILS NFR BLD AUTO: 63.9 %
NRBC BLD-RTO: 0 /100 WBCS (ref 0–0)
PLATELET # BLD AUTO: 201 X10*3/UL (ref 150–450)
POTASSIUM SERPL-SCNC: 3.8 MMOL/L (ref 3.5–5.3)
PROT SERPL-MCNC: 7 G/DL (ref 6.4–8.2)
RBC # BLD AUTO: 5.35 X10*6/UL (ref 4.5–5.9)
RSV RNA RESP QL NAA+PROBE: NOT DETECTED
SARS-COV-2 RNA RESP QL NAA+PROBE: NOT DETECTED
SODIUM SERPL-SCNC: 134 MMOL/L (ref 136–145)
UFH PPP CHRO-ACNC: 0.3 IU/ML
UFH PPP CHRO-ACNC: 0.7 IU/ML
UFH PPP CHRO-ACNC: 1 IU/ML
UFH PPP CHRO-ACNC: >2 IU/ML
WBC # BLD AUTO: 4 X10*3/UL (ref 4.4–11.3)

## 2025-03-31 PROCEDURE — 2500000004 HC RX 250 GENERAL PHARMACY W/ HCPCS (ALT 636 FOR OP/ED): Performed by: NURSE PRACTITIONER

## 2025-03-31 PROCEDURE — 2550000001 HC RX 255 CONTRASTS: Performed by: EMERGENCY MEDICINE

## 2025-03-31 PROCEDURE — 85025 COMPLETE CBC W/AUTO DIFF WBC: CPT

## 2025-03-31 PROCEDURE — 36415 COLL VENOUS BLD VENIPUNCTURE: CPT

## 2025-03-31 PROCEDURE — 87040 BLOOD CULTURE FOR BACTERIA: CPT | Mod: STJLAB

## 2025-03-31 PROCEDURE — 99285 EMERGENCY DEPT VISIT HI MDM: CPT | Mod: 25 | Performed by: EMERGENCY MEDICINE

## 2025-03-31 PROCEDURE — 84484 ASSAY OF TROPONIN QUANT: CPT

## 2025-03-31 PROCEDURE — 93970 EXTREMITY STUDY: CPT | Performed by: SURGERY

## 2025-03-31 PROCEDURE — 93005 ELECTROCARDIOGRAM TRACING: CPT

## 2025-03-31 PROCEDURE — 96376 TX/PRO/DX INJ SAME DRUG ADON: CPT

## 2025-03-31 PROCEDURE — 80053 COMPREHEN METABOLIC PANEL: CPT

## 2025-03-31 PROCEDURE — 96365 THER/PROPH/DIAG IV INF INIT: CPT

## 2025-03-31 PROCEDURE — 2500000001 HC RX 250 WO HCPCS SELF ADMINISTERED DRUGS (ALT 637 FOR MEDICARE OP): Performed by: STUDENT IN AN ORGANIZED HEALTH CARE EDUCATION/TRAINING PROGRAM

## 2025-03-31 PROCEDURE — 87637 SARSCOV2&INF A&B&RSV AMP PRB: CPT

## 2025-03-31 PROCEDURE — 2500000004 HC RX 250 GENERAL PHARMACY W/ HCPCS (ALT 636 FOR OP/ED): Performed by: INTERNAL MEDICINE

## 2025-03-31 PROCEDURE — 87075 CULTR BACTERIA EXCEPT BLOOD: CPT | Mod: STJLAB

## 2025-03-31 PROCEDURE — 85520 HEPARIN ASSAY: CPT | Performed by: INTERNAL MEDICINE

## 2025-03-31 PROCEDURE — 2500000004 HC RX 250 GENERAL PHARMACY W/ HCPCS (ALT 636 FOR OP/ED)

## 2025-03-31 PROCEDURE — 99291 CRITICAL CARE FIRST HOUR: CPT | Performed by: EMERGENCY MEDICINE

## 2025-03-31 PROCEDURE — 93970 EXTREMITY STUDY: CPT

## 2025-03-31 PROCEDURE — 96366 THER/PROPH/DIAG IV INF ADDON: CPT

## 2025-03-31 PROCEDURE — 99221 1ST HOSP IP/OBS SF/LOW 40: CPT | Performed by: NURSE PRACTITIONER

## 2025-03-31 PROCEDURE — 83605 ASSAY OF LACTIC ACID: CPT

## 2025-03-31 PROCEDURE — 2500000001 HC RX 250 WO HCPCS SELF ADMINISTERED DRUGS (ALT 637 FOR MEDICARE OP)

## 2025-03-31 PROCEDURE — 71275 CT ANGIOGRAPHY CHEST: CPT | Mod: FOREIGN READ | Performed by: RADIOLOGY

## 2025-03-31 PROCEDURE — 71275 CT ANGIOGRAPHY CHEST: CPT

## 2025-03-31 PROCEDURE — 36415 COLL VENOUS BLD VENIPUNCTURE: CPT | Performed by: INTERNAL MEDICINE

## 2025-03-31 PROCEDURE — 2060000001 HC INTERMEDIATE ICU ROOM DAILY

## 2025-03-31 PROCEDURE — 4500999001 HC ED NO CHARGE

## 2025-03-31 RX ORDER — VANCOMYCIN HYDROCHLORIDE 1 G/20ML
INJECTION, POWDER, LYOPHILIZED, FOR SOLUTION INTRAVENOUS DAILY PRN
Status: DISCONTINUED | OUTPATIENT
Start: 2025-03-31 | End: 2025-04-01

## 2025-03-31 RX ORDER — ACETAMINOPHEN 325 MG/1
975 TABLET ORAL ONCE
Status: COMPLETED | OUTPATIENT
Start: 2025-03-31 | End: 2025-03-31

## 2025-03-31 RX ORDER — HEPARIN SODIUM 10000 [USP'U]/100ML
0-4500 INJECTION, SOLUTION INTRAVENOUS CONTINUOUS
Status: DISPENSED | OUTPATIENT
Start: 2025-03-31 | End: 2025-04-02

## 2025-03-31 RX ORDER — ACETAMINOPHEN 500 MG
1000 TABLET ORAL EVERY 6 HOURS PRN
COMMUNITY
End: 2025-04-03 | Stop reason: HOSPADM

## 2025-03-31 RX ORDER — ERTAPENEM 1 G/1
1 INJECTION, POWDER, LYOPHILIZED, FOR SOLUTION INTRAMUSCULAR; INTRAVENOUS EVERY 24 HOURS
Status: DISCONTINUED | OUTPATIENT
Start: 2025-03-31 | End: 2025-04-02

## 2025-03-31 RX ORDER — ACETAMINOPHEN 325 MG/1
650 TABLET ORAL EVERY 6 HOURS PRN
Status: DISCONTINUED | OUTPATIENT
Start: 2025-03-31 | End: 2025-04-03 | Stop reason: HOSPADM

## 2025-03-31 RX ORDER — VANCOMYCIN 1.75 GRAM/500 ML IN 0.9 % SODIUM CHLORIDE INTRAVENOUS
1750 ONCE
Status: COMPLETED | OUTPATIENT
Start: 2025-03-31 | End: 2025-03-31

## 2025-03-31 RX ORDER — DOCUSATE SODIUM 100 MG/1
100 CAPSULE, LIQUID FILLED ORAL 2 TIMES DAILY
Status: DISCONTINUED | OUTPATIENT
Start: 2025-03-31 | End: 2025-04-03 | Stop reason: HOSPADM

## 2025-03-31 RX ORDER — VANCOMYCIN 1.75 GRAM/500 ML IN 0.9 % SODIUM CHLORIDE INTRAVENOUS
1750 EVERY 12 HOURS
Status: DISCONTINUED | OUTPATIENT
Start: 2025-03-31 | End: 2025-04-01

## 2025-03-31 RX ORDER — POLYETHYLENE GLYCOL 3350 17 G/17G
17 POWDER, FOR SOLUTION ORAL DAILY
Status: DISCONTINUED | OUTPATIENT
Start: 2025-03-31 | End: 2025-04-03 | Stop reason: HOSPADM

## 2025-03-31 RX ADMIN — IOHEXOL 75 ML: 350 INJECTION, SOLUTION INTRAVENOUS at 05:58

## 2025-03-31 RX ADMIN — HEPARIN SODIUM 1400 UNITS/HR: 10000 INJECTION, SOLUTION INTRAVENOUS at 20:58

## 2025-03-31 RX ADMIN — ACETAMINOPHEN 650 MG: 325 TABLET ORAL at 21:49

## 2025-03-31 RX ADMIN — Medication 1750 MG: at 21:50

## 2025-03-31 RX ADMIN — ACETAMINOPHEN 975 MG: 325 TABLET ORAL at 14:30

## 2025-03-31 RX ADMIN — HEPARIN SODIUM 1400 UNITS/HR: 10000 INJECTION, SOLUTION INTRAVENOUS at 18:45

## 2025-03-31 RX ADMIN — Medication 1750 MG: at 10:43

## 2025-03-31 RX ADMIN — HEPARIN SODIUM 1600 UNITS/HR: 10000 INJECTION, SOLUTION INTRAVENOUS at 07:36

## 2025-03-31 RX ADMIN — HEPARIN SODIUM 1600 UNITS/HR: 10000 INJECTION, SOLUTION INTRAVENOUS at 14:58

## 2025-03-31 RX ADMIN — ERTAPENEM SODIUM 1 G: 1 INJECTION, POWDER, LYOPHILIZED, FOR SOLUTION INTRAMUSCULAR; INTRAVENOUS at 20:59

## 2025-03-31 SDOH — ECONOMIC STABILITY: FOOD INSECURITY: HOW HARD IS IT FOR YOU TO PAY FOR THE VERY BASICS LIKE FOOD, HOUSING, MEDICAL CARE, AND HEATING?: NOT HARD AT ALL

## 2025-03-31 SDOH — HEALTH STABILITY: MENTAL HEALTH: HOW MANY DRINKS CONTAINING ALCOHOL DO YOU HAVE ON A TYPICAL DAY WHEN YOU ARE DRINKING?: 1 OR 2

## 2025-03-31 SDOH — ECONOMIC STABILITY: HOUSING INSECURITY: AT ANY TIME IN THE PAST 12 MONTHS, WERE YOU HOMELESS OR LIVING IN A SHELTER (INCLUDING NOW)?: NO

## 2025-03-31 SDOH — ECONOMIC STABILITY: TRANSPORTATION INSECURITY: IN THE PAST 12 MONTHS, HAS LACK OF TRANSPORTATION KEPT YOU FROM MEDICAL APPOINTMENTS OR FROM GETTING MEDICATIONS?: NO

## 2025-03-31 SDOH — ECONOMIC STABILITY: FOOD INSECURITY: WITHIN THE PAST 12 MONTHS, YOU WORRIED THAT YOUR FOOD WOULD RUN OUT BEFORE YOU GOT THE MONEY TO BUY MORE.: NEVER TRUE

## 2025-03-31 SDOH — HEALTH STABILITY: MENTAL HEALTH: HOW OFTEN DO YOU HAVE SIX OR MORE DRINKS ON ONE OCCASION?: NEVER

## 2025-03-31 SDOH — ECONOMIC STABILITY: INCOME INSECURITY: IN THE PAST 12 MONTHS HAS THE ELECTRIC, GAS, OIL, OR WATER COMPANY THREATENED TO SHUT OFF SERVICES IN YOUR HOME?: NO

## 2025-03-31 SDOH — HEALTH STABILITY: MENTAL HEALTH: HOW OFTEN DO YOU HAVE A DRINK CONTAINING ALCOHOL?: MONTHLY OR LESS

## 2025-03-31 SDOH — ECONOMIC STABILITY: HOUSING INSECURITY: IN THE LAST 12 MONTHS, WAS THERE A TIME WHEN YOU WERE NOT ABLE TO PAY THE MORTGAGE OR RENT ON TIME?: NO

## 2025-03-31 SDOH — SOCIAL STABILITY: SOCIAL INSECURITY: WITHIN THE LAST YEAR, HAVE YOU BEEN AFRAID OF YOUR PARTNER OR EX-PARTNER?: NO

## 2025-03-31 SDOH — SOCIAL STABILITY: SOCIAL INSECURITY: WITHIN THE LAST YEAR, HAVE YOU BEEN HUMILIATED OR EMOTIONALLY ABUSED IN OTHER WAYS BY YOUR PARTNER OR EX-PARTNER?: NO

## 2025-03-31 SDOH — ECONOMIC STABILITY: FOOD INSECURITY: WITHIN THE PAST 12 MONTHS, THE FOOD YOU BOUGHT JUST DIDN'T LAST AND YOU DIDN'T HAVE MONEY TO GET MORE.: NEVER TRUE

## 2025-03-31 SDOH — ECONOMIC STABILITY: HOUSING INSECURITY: IN THE PAST 12 MONTHS, HOW MANY TIMES HAVE YOU MOVED WHERE YOU WERE LIVING?: 0

## 2025-03-31 SDOH — SOCIAL STABILITY: SOCIAL INSECURITY: HAVE YOU HAD THOUGHTS OF HARMING ANYONE ELSE?: NO

## 2025-03-31 ASSESSMENT — ACTIVITIES OF DAILY LIVING (ADL)
DRESSING YOURSELF: INDEPENDENT
GROOMING: INDEPENDENT
HEARING - RIGHT EAR: FUNCTIONAL
LACK_OF_TRANSPORTATION: NO
LACK_OF_TRANSPORTATION: NO
WALKS IN HOME: INDEPENDENT
PATIENT'S MEMORY ADEQUATE TO SAFELY COMPLETE DAILY ACTIVITIES?: YES
JUDGMENT_ADEQUATE_SAFELY_COMPLETE_DAILY_ACTIVITIES: YES
LACK_OF_TRANSPORTATION: NO
HEARING - LEFT EAR: FUNCTIONAL
FEEDING YOURSELF: INDEPENDENT
ADEQUATE_TO_COMPLETE_ADL: YES
TOILETING: INDEPENDENT
BATHING: INDEPENDENT
LACK_OF_TRANSPORTATION: NO

## 2025-03-31 ASSESSMENT — LIFESTYLE VARIABLES
AUDIT-C TOTAL SCORE: 2
SKIP TO QUESTIONS 9-10: 1
AUDIT-C TOTAL SCORE: 2
AUDIT-C TOTAL SCORE: 1
HOW OFTEN DO YOU HAVE 6 OR MORE DRINKS ON ONE OCCASION: LESS THAN MONTHLY
SKIP TO QUESTIONS 9-10: 0
HOW MANY STANDARD DRINKS CONTAINING ALCOHOL DO YOU HAVE ON A TYPICAL DAY: 1 OR 2
HOW OFTEN DO YOU HAVE A DRINK CONTAINING ALCOHOL: MONTHLY OR LESS

## 2025-03-31 ASSESSMENT — COGNITIVE AND FUNCTIONAL STATUS - GENERAL
PATIENT BASELINE BEDBOUND: NO
DRESSING REGULAR UPPER BODY CLOTHING: A LITTLE
DRESSING REGULAR LOWER BODY CLOTHING: A LITTLE
HELP NEEDED FOR BATHING: A LITTLE
MOBILITY SCORE: 24
DAILY ACTIVITIY SCORE: 21

## 2025-03-31 ASSESSMENT — PAIN SCALES - GENERAL
PAINLEVEL_OUTOF10: 0 - NO PAIN
PAINLEVEL_OUTOF10: 2
PAINLEVEL_OUTOF10: 1

## 2025-03-31 ASSESSMENT — PAIN - FUNCTIONAL ASSESSMENT
PAIN_FUNCTIONAL_ASSESSMENT: 0-10

## 2025-03-31 ASSESSMENT — PAIN DESCRIPTION - DESCRIPTORS: DESCRIPTORS: DULL

## 2025-03-31 ASSESSMENT — PAIN SCALES - PAIN ASSESSMENT IN ADVANCED DEMENTIA (PAINAD): TOTALSCORE: MEDICATION (SEE MAR)

## 2025-03-31 ASSESSMENT — PATIENT HEALTH QUESTIONNAIRE - PHQ9
2. FEELING DOWN, DEPRESSED OR HOPELESS: NOT AT ALL
1. LITTLE INTEREST OR PLEASURE IN DOING THINGS: NOT AT ALL
SUM OF ALL RESPONSES TO PHQ9 QUESTIONS 1 & 2: 0

## 2025-03-31 ASSESSMENT — COLUMBIA-SUICIDE SEVERITY RATING SCALE - C-SSRS
6. HAVE YOU EVER DONE ANYTHING, STARTED TO DO ANYTHING, OR PREPARED TO DO ANYTHING TO END YOUR LIFE?: NO
1. IN THE PAST MONTH, HAVE YOU WISHED YOU WERE DEAD OR WISHED YOU COULD GO TO SLEEP AND NOT WAKE UP?: NO
2. HAVE YOU ACTUALLY HAD ANY THOUGHTS OF KILLING YOURSELF?: NO
6. HAVE YOU EVER DONE ANYTHING, STARTED TO DO ANYTHING, OR PREPARED TO DO ANYTHING TO END YOUR LIFE?: NO
1. IN THE PAST MONTH, HAVE YOU WISHED YOU WERE DEAD OR WISHED YOU COULD GO TO SLEEP AND NOT WAKE UP?: NO
2. HAVE YOU ACTUALLY HAD ANY THOUGHTS OF KILLING YOURSELF?: NO

## 2025-03-31 ASSESSMENT — PAIN DESCRIPTION - LOCATION: LOCATION: CHEST

## 2025-03-31 NOTE — CONSULTS
Consults  History Of Present Illness:    Ernesto Thurston is a 45 y.o. male presenting with [facility] ED with midsternal chest pain that woke patient up in the night.     Patient seen in [facility]. Wife at bedside. Endorses midsternal chest pain, dull pain radiating to back. Denies palpitations. Denies SOB on exertion. Denies orthopnea/PND. Denies lightheadedness, dizziness, and syncope. Denies edema. Medication adherent. Hx of bilateral PEs in 2005 s/p ACL repair. IVC filter placed and treated w/Warfarin. Hypercoaguable evaluation negative per patient.  Has a sister w/hx of DVT, hypercoagulable workup negative. Patient had recent shoulder surgery w/PICC line for IV antibiotics. No known malignancy/chemotherapy. No hormonal therapy.     Past Medical History:  He has a past medical history of Asthma, ED (erectile dysfunction), Hyperlipidemia, Infection of prosthetic shoulder joint, Mass of skin of left shoulder, Obesity, Osteoarthritis of both hips resulting from hip dysplasia, Other conditions influencing health status, PONV (postoperative nausea and vomiting), and Pulmonary embolism.    Past Surgical History:  He has a past surgical history that includes Embolectomy; Total shoulder arthroplasty (Left, 2014); Hernia repair; Vasectomy; Anterior cruciate ligament repair; Meniscectomy (Left, 12/08/2021); Abscess drainage (Left, 02/15/2023); Abscess drainage (Left, 11/13/2024); Wound exploration (09/02/2022); and Other surgical history.      Social History:  He reports that he has never smoked. He has never used smokeless tobacco. He reports that he does not currently use alcohol. He reports that he does not use drugs.    Family History:  Family History   Problem Relation Name Age of Onset    Heart attack Father      Coronary artery disease Father          h/o CABG    Coronary artery disease Paternal Grandfather       Last Recorded Vitals:  Vitals:    03/31/25 0845 03/31/25 0900 03/31/25 0915 03/31/25 1000   BP:  108/80  136/85    BP Location:  Left arm     Patient Position:  Lying     Pulse: 66 70 66 78   Resp:  18  18   Temp:       TempSrc:       SpO2:  95%  97%   Weight:       Height:         Last I/O:  No intake/output data recorded.    Physical Exam  Vitals reviewed.   Constitutional:       Appearance: Normal appearance. He is overweight.   HENT:      Head: Normocephalic and atraumatic.      Nose: Nose normal.      Mouth/Throat:      Mouth: Mucous membranes are moist.   Eyes:      Conjunctiva/sclera: Conjunctivae normal.   Neck:      Vascular: No hepatojugular reflux or JVD.   Cardiovascular:      Rate and Rhythm: Normal rate and regular rhythm.      Pulses:           Radial pulses are 2+ on the right side and 2+ on the left side.        Posterior tibial pulses are 2+ on the right side and 2+ on the left side.   Pulmonary:      Effort: Pulmonary effort is normal.      Breath sounds: Normal breath sounds.   Musculoskeletal:      Right lower leg: No edema.      Left lower leg: No edema.   Feet:      Right foot:      Skin integrity: No erythema or warmth.      Left foot:      Skin integrity: No erythema or warmth.   Skin:     General: Skin is warm and dry.   Neurological:      Mental Status: He is alert and oriented to person, place, and time.   Psychiatric:         Mood and Affect: Mood normal.     Last Labs:  CBC - 3/31/2025:  5:17 AM  4.0 14.8 201    43.3      CMP - 3/31/2025:  5:17 AM  9.1 7.0 55 --- 0.7   _ 4.3 94 111      PTT - No results in last year.  1.1   11.7 _     Troponin I, High Sensitivity   Date/Time Value Ref Range Status   03/31/2025 06:10 AM 4 0 - 20 ng/L Final   03/31/2025 05:17 AM 5 0 - 20 ng/L Final      Diagnostic Imaging:   VASC US UPPER EXTREMITY VENOUS DUPLEX BILATERAL     Patient Name:     SHELDON BELL Reading Physician:  [id number] Lambert Bronson MD  Study Date:       3/31/2025      Ordering Provider:  [id number] LAURY NERI  MRN/PID:          [id number]       Fellow:  Accession#:       [id number]   Technologist:        Janice Montanez RVT  YOB: 1980      Technologist 2:  Gender:           M              Encounter#:         [id number]  Admission Status: Emergency      Location Performed: [facility]        Diagnosis/ICD: Left arm swelling-M79.89; Right arm swelling-M79.89  Indication:    Limb swelling  CPT Codes:     79109 Peripheral venous duplex scan for DVT complete        Pertinent History: Recent Surgery.        **CRITICAL RESULT**  Critical Result: DVT in R Axil V and L IJV  Notification called to Sergei Soria MD on 3/31/2025 Acknowledged critical results notification communicated via secure chat by Janice Montanez RVT.     PRELIMINARY CONCLUSIONS:     Right Upper Venous: There is acute non-occlusive deep vein thrombosis visualized in the axillary vein. Additional Findings; IV Line. visualized in right basilic vein, axcillary vein and subclavian vein.  Left Upper Venous: There is acute occlusive deep vein thrombosis visualized in the internal jugular vein.     Imaging & Doppler Findings:     Right               Compressible      Thrombus              Flow  Internal Jugular        Yes             None         Spontaneous/Phasic  Subclavian              Yes             None  Subclavian Proximal     Yes             None         Spontaneous/Phasic  Subclavian Mid          Yes             None  Subclavian Distal       Yes             None         Spontaneous/Phasic  Axillary                 No      Acute non-occlusive     Continuous  Brachial                Yes             None  Cephalic                Yes             None  Basilic                 Yes             None        Left                Compress    Thrombus            Flow  Internal Jugular       No    Acute occlusive        None  Subclavian            Yes         None  Subclavian Proximal   Yes         None       Spontaneous/Phasic  Subclavian Mid        Yes         None  Subclavian Distal     Yes         None       Spontaneous/Phasic  Axillary               Yes         None       Spontaneous/Phasic  Brachial              Yes         None  Cephalic              Yes         None  Basilic               Yes         None        VASCULAR PRELIMINARY REPORT  completed by Janice Montanez RVT on 3/31/2025 at 9:54:34 AM    CT Angiogram of the Chest; 3/31/2025 6:07 AM.  INDICATION:  Chest pain and fever.  Recent shoulder surgery.  Presents with PICC  line in the right arm which is not functioning properly.  Concern for  pulmonary embolism/pneumonia.    COMPARISON:  Chest and rib radiographs 10/20/2021.  ACCESSION NUMBER(S):  [id number]  ORDERING CLINICIAN:  PUNEET THORNTON  TECHNIQUE:  CTA of the chest was performed with intravenous contrast.   Images are reviewed and processed at a workstation according to the CT  angiogram protocol with 3-D and/or MIP post processing imaging  generated.  Omnipaque 350, 75 mL was administered intravenously.   Automated mA/kV exposure control was utilized and patient examination  was performed in strict accordance with principles of ALARA.  FINDINGS:   Lungs and pleura: The lungs are clear.  No pleural effusion or  pneumothorax.  Heart: Heart size is normal.  No significant coronary artery  calcifications are seen.  Vasculature: The aorta is not enlarged. The pulmonary trunk is not  enlarged. Nonocclusive pulmonary emboli are seen within multiple  segmental arteries to the right lower lobe.  Mediastinum: The esophagus is unremarkable. No visible hiatal hernia.  Lymph nodes: Left axillary lymphadenopathy is seen with short axis  measurement up to 1.2 cm. No mediastinal adenopathy.  Chest wall: Unremarkable.  Thyroid gland: The thyroid gland is normal in appearance.  Upper abdomen: There is significant hepatic steatosis. The imaged  upper abdomen is otherwise unremarkable.  Musculoskeletal: Mild spondylitic changes are seen within the spine.  IMPRESSION:  1.Nonocclusive pulmonary emboli within multiple segmental  arteries to the right lower lobe. No  evidence of right heart strain.  2.Left axillary lymphadenopathy. Correlate for possible left  upper extremity infection.  3.Hepatic steatosis.  Findings discussed with Jose F Salinas DO at approximately 0650 hours on  31 March.  Signed by Luis Zarate MD    Allergies:  Percocet [oxycodone-acetaminophen]    Inpatient Medications:  Scheduled medications   Medication Dose Route Frequency    vancomycin  1,750 mg intravenous Once     PRN medications   Medication    heparin     Continuous Medications   Medication Dose Last Rate    heparin  0-4,500 Units/hr 1,600 Units/hr (03/31/25 0736)     Outpatient Medications:  Current Outpatient Medications   Medication Instructions    acetaminophen (TYLENOL) 1,000 mg, Every 6 hours PRN    chlorhexidine (Peridex) 0.12 % solution SWISH AND SPIT 15ML FOR 30 SECONDS NIGHT PRIOR TO SURGERY AND MORNING OF SURGERY    docusate sodium (COLACE) 100 mg, oral, 2 times daily    ertapenem (INVANZ) 1 g, intravenous, Every 24 hours, Once weekly labs CBC/diff, Creatinine, ESR, CRP fax to Dr. Knapp [phone]. Stop date 4/15/25.    HYDROcodone-acetaminophen (Norco) 5-325 mg tablet 1 tablet, oral, Every 6 hours PRN    meloxicam (MOBIC) 15 mg, Daily    vancomycin 1 g in dextrose 5% 250 mL IVPB 1 g, intravenous, Every 12 hours     Assessment/Plan   RLL PE, Right axillary vein DVT, and Left internal jugular vein DVT  Midsternal chest painw ith dull ache radiation to back. Denies palptiations. Denies SOB. Denies orthopnea/PND. Denies lightheadedness, dizziness, and syncope. Denies edema.   RRR on exam. No JVD/HJR. Lungs CTAB. SpO2 97% on RA. No peripheral edema/erythema.   Hx of bilateral PEs in 2005 s/p ACL repair. IVC filter placed and treated w/Warfarin. Hypercoaguable evaluation negative per patient.  Has a sister w/hx of DVT, hypercoagulable workup negative. Patient had recent shoulder surgery w/PICC line for IV antibiotics. No known malignancy/chemotherapy. No hormonal therapy.   Vascular US UE  Venous Duplex Bilateral 3/31/25 Right Upper Venous: There is acute non-occlusive deep vein thrombosis visualized in the axillary vein. Additional Findings; IV Line. visualized in right basilic vein, axcillary vein and subclavian vein. Left Upper Venous: There is acute occlusive deep vein thrombosis visualized in the internal jugular vein.  CT for PE 3/31/25 Nonocclusive pulmonary emboli within multiple segmental arteries to the right lower lobe. No evidence of right heart strain.  Patient had previous IVC filter placed in 2005 that was not removed.   Recommend discontinuing PICC line.   Continue Heparin gtt, transition to DOAC as tolerated.     Discussed plan of care with Dr. Isha Lr.   I spent 35 minutes in the professional and overall care of this patient.    KAREN Grullon-CNP  Vascular Surgery  [facility]  [facility]

## 2025-03-31 NOTE — PROGRESS NOTES
Vancomycin Dosing by Pharmacy- INITIAL    Ernesto Thurston is a 45 y.o. year old male who Pharmacy has been consulted for vancomycin dosing for cellulitis, skin and soft tissue. Based on the patient's indication and renal status this patient will be dosed based on a goal AUC of 400-600.     Renal function is currently stable.    Visit Vitals  BP (!) 151/103   Pulse 80   Temp 37.7 °C (99.9 °F) (Temporal)   Resp 18        Lab Results   Component Value Date    CREATININE 0.78 2025    CREATININE 0.97 2025        Patient weight is as follows:   Vitals:    25 0456   Weight: 88.5 kg (195 lb)       Cultures:  No results found for the encounter in last 14 days.        No intake/output data recorded.  I/O during current shift:  No intake/output data recorded.    Temp (24hrs), Av.7 °C (99.9 °F), Min:37.7 °C (99.9 °F), Max:37.7 °C (99.9 °F)         Assessment/Plan     Patient will not be given a loading dose.  Will initiate vancomycin maintenance,  1750 mg every 12 hours.  Patient was receiving this dose prior to admission  Follow-up level will be ordered on  at 0500 unless clinically indicated sooner.  Will continue to monitor renal function daily while on vancomycin and order serum creatinine at least every 48 hours if not already ordered.  Follow for continued vancomycin needs, clinical response, and signs/symptoms of toxicity.       Florencio Fu, BhavnaD

## 2025-03-31 NOTE — ED PROVIDER NOTES
EMERGENCY DEPARTMENT ENCOUNTER      Pt Name: Ernesto Thurston  MRN: [id number]  Birthdate 1980  Date of evaluation: 3/31/2025  Provider: Jose F Salinas DO    CHIEF COMPLAINT       Chief Complaint   Patient presents with    Post-op Problem    Chest Pain         HISTORY OF PRESENT ILLNESS    45-year-old male comes emergency room for low-grade fever, chest pain starting in the middle of the night, his PICC line has been slow to flush the last couple days, had a history of a left shoulder arthroplasty which was just replaced with his antibiotic spacer after an infection, was just discharged from the hospital in early March.  He is having some chest pain shooting through to his back, no shortness of breath, it is nonpleuritic, does have a history of PE after a surgery in the past as well however.           Nursing Notes were reviewed.    PAST MEDICAL HISTORY     Past Medical History:   Diagnosis Date    Asthma     ED (erectile dysfunction)     Hyperlipidemia     Infection of prosthetic shoulder joint     Plan: Left Shoulder Open Debridement; Excision of Heterotopic Ossification; Removal of Prosthesis; Insertion of Antibiotic Spacer 3/4/25    Mass of skin of left shoulder     Obesity     Osteoarthritis of both hips resulting from hip dysplasia     Other conditions influencing health status     Chronic Pulmonary Embolism    PONV (postoperative nausea and vomiting)     Pulmonary embolism     Post-op PE s/p Embolectomy and IVC filter         SURGICAL HISTORY       Past Surgical History:   Procedure Laterality Date    ABCESS DRAINAGE Left 02/15/2023    shoulder    ABCESS DRAINAGE Left 11/13/2024    left shoulder, wound cultures    ANTERIOR CRUCIATE LIGAMENT REPAIR      EMBOLECTOMY      Pulmonary Artery Embolectomy    HERNIA REPAIR      MENISCECTOMY Left 12/08/2021    OTHER SURGICAL HISTORY      IVC filter placed    TOTAL SHOULDER ARTHROPLASTY Left 2014    VASECTOMY      WOUND EXPLORATION  09/02/2022    EXPLORATION LEFT  SHOULDER, I&D         CURRENT MEDICATIONS       Previous Medications    CHLORHEXIDINE (PERIDEX) 0.12 % SOLUTION    SWISH AND SPIT 15ML FOR 30 SECONDS NIGHT PRIOR TO SURGERY AND MORNING OF SURGERY    DOCUSATE SODIUM (COLACE) 100 MG CAPSULE    Take 1 capsule (100 mg) by mouth 2 times a day.    ERTAPENEM (INVANZ) IV    Infuse 50 mL (1 g) over 30 minutes into a venous catheter once every 24 hours. Once weekly labs CBC/diff, Creatinine, ESR, CRP fax to Dr. Knapp [phone]. Stop date 4/15/25.    HYDROCODONE-ACETAMINOPHEN (NORCO) 5-325 MG TABLET    Take 1 tablet by mouth every 6 hours if needed for severe pain (7 - 10).    MELOXICAM (MOBIC) 15 MG TABLET    Take 1 tablet (15 mg) by mouth once daily.    VANCOMYCIN 1 G IN DEXTROSE 5% 250 ML IVPB    Infuse 1 g at 166.7 mL/hr over 90 minutes into a venous catheter every 12 hours.       ALLERGIES     Percocet [oxycodone-acetaminophen]    FAMILY HISTORY       Family History   Problem Relation Name Age of Onset    Heart attack Father      Coronary artery disease Father          h/o CABG    Coronary artery disease Paternal Grandfather            SOCIAL HISTORY       Social History     Socioeconomic History    Marital status:    Tobacco Use    Smoking status: Never    Smokeless tobacco: Never   Vaping Use    Vaping status: Never Used   Substance and Sexual Activity    Alcohol use: Not Currently     Comment: socially    Drug use: Never    Sexual activity: Defer     Social Drivers of Health     Financial Resource Strain: Low Risk  (3/5/2025)    Overall Financial Resource Strain (CARDIA)     Difficulty of Paying Living Expenses: Not hard at all   Food Insecurity: No Food Insecurity (3/4/2025)    Hunger Vital Sign     Worried About Running Out of Food in the Last Year: Never true     Ran Out of Food in the Last Year: Never true   Transportation Needs: No Transportation Needs (3/5/2025)    PRAPARE - Transportation     Lack of Transportation (Medical): No     Lack of  Transportation (Non-Medical): No   Intimate Partner Violence: Not At Risk (3/4/2025)    Humiliation, Afraid, Rape, and Kick questionnaire     Fear of Current or Ex-Partner: No     Emotionally Abused: No     Physically Abused: No     Sexually Abused: No   Housing Stability: Low Risk  (3/5/2025)    Housing Stability Vital Sign     Unable to Pay for Housing in the Last Year: No     Number of Times Moved in the Last Year: 0     Homeless in the Last Year: No       SCREENINGS                        PHYSICAL EXAM    (up to 7 for level 4, 8 or more for level 5)     ED Triage Vitals [03/31/25 0456]   Temperature Heart Rate Respirations BP   37.7 °C (99.9 °F) 93 18 (!) 142/94      Pulse Ox Temp Source Heart Rate Source Patient Position   96 % Temporal Monitor Sitting      BP Location FiO2 (%)     -- --       Physical Exam  Vitals and nursing note reviewed.   Constitutional:       General: He is not in acute distress.  HENT:      Head: Normocephalic and atraumatic.   Eyes:      General: No scleral icterus.        Right eye: No discharge.         Left eye: No discharge.      Conjunctiva/sclera: Conjunctivae normal.   Cardiovascular:      Rate and Rhythm: Normal rate and regular rhythm.      Pulses: Normal pulses.   Pulmonary:      Effort: Pulmonary effort is normal.   Abdominal:      General: Abdomen is flat.      Palpations: Abdomen is soft.      Tenderness: There is no abdominal tenderness. There is no guarding or rebound.   Musculoskeletal:         General: No deformity.      Right lower leg: No edema.      Left lower leg: No edema.   Skin:     General: Skin is warm and dry.   Neurological:      Mental Status: He is alert and oriented to person, place, and time. Mental status is at baseline.   Psychiatric:         Mood and Affect: Mood normal.         Behavior: Behavior normal.          DIAGNOSTIC RESULTS     LABS:  Labs Reviewed   CBC WITH AUTO DIFFERENTIAL - Abnormal       Result Value    WBC 4.0 (*)     nRBC 0.0      RBC  5.35      Hemoglobin 14.8      Hematocrit 43.3      MCV 81      MCH 27.7      MCHC 34.2      RDW 13.2      Platelets 201      Neutrophils % 63.9      Immature Granulocytes %, Automated 1.2 (*)     Lymphocytes % 14.7      Monocytes % 13.5      Eosinophils % 6.2      Basophils % 0.5      Neutrophils Absolute 2.56      Immature Granulocytes Absolute, Automated 0.05      Lymphocytes Absolute 0.59 (*)     Monocytes Absolute 0.54      Eosinophils Absolute 0.25      Basophils Absolute 0.02     COMPREHENSIVE METABOLIC PANEL - Abnormal    Glucose 107 (*)     Sodium 134 (*)     Potassium 3.8      Chloride 102      Bicarbonate 23      Anion Gap 13      Urea Nitrogen 15      Creatinine 0.78      eGFR >90      Calcium 9.1      Albumin 4.3      Alkaline Phosphatase 111      Total Protein 7.0      AST 55 (*)     Bilirubin, Total 0.7      ALT 94 (*)    SARS-COV-2, INFLUENZA A/B AND RSV PCR - Normal    Coronavirus 2019, PCR Not Detected      Flu A Result Not Detected      Flu B Result Not Detected      RSV PCR Not Detected      Narrative:     This assay is an FDA-cleared, in vitro diagnostic nucleic acid amplification test for the qualitative detection and differentiation of SARS CoV-2/ Influenza A/B/ RSV from nasopharyngeal specimens collected from individuals with signs and symptoms of respiratory tract infections, and has been validated for use at [facility]. Negative results do not preclude COVID-19/ Influenza A/B/ RSV infections and should not be used as the sole basis for diagnosis, treatment, or other management decisions. Testing for SARS CoV-2 is recommended only for patients who meet current clinical and/or epidemiological criteria defined by federal, state, or local public health directives.   LACTATE - Normal    Lactate 1.5      Narrative:     Venipuncture immediately after or during the administration of Metamizole may lead to falsely low results. Testing should be performed immediately prior to  Metamizole dosing.   SERIAL TROPONIN-INITIAL - Normal    Troponin I, High Sensitivity 5      Narrative:     Less than 99th percentile of normal range cutoff-  Female and children under 18 years old <14 ng/L; Male <21 ng/L: Negative  Repeat testing should be performed if clinically indicated.     Female and children under 18 years old 14-50 ng/L; Male 21-50 ng/L:  Consistent with possible cardiac damage and possible increased clinical   risk. Serial measurements may help to assess extent of myocardial damage.     >50 ng/L: Consistent with cardiac damage, increased clinical risk and  myocardial infarction. Serial measurements may help assess extent of   myocardial damage.      NOTE: Children less than 1 year old may have higher baseline troponin   levels and results should be interpreted in conjunction with the overall   clinical context.     NOTE: Troponin I testing is performed using a different   testing methodology at [facility] than at other   [facility]. Direct result comparisons should only   be made within the same method.   SERIAL TROPONIN, 1 HOUR - Normal    Troponin I, High Sensitivity 4      Narrative:     Less than 99th percentile of normal range cutoff-  Female and children under 18 years old <14 ng/L; Male <21 ng/L: Negative  Repeat testing should be performed if clinically indicated.     Female and children under 18 years old 14-50 ng/L; Male 21-50 ng/L:  Consistent with possible cardiac damage and possible increased clinical   risk. Serial measurements may help to assess extent of myocardial damage.     >50 ng/L: Consistent with cardiac damage, increased clinical risk and  myocardial infarction. Serial measurements may help assess extent of   myocardial damage.      NOTE: Children less than 1 year old may have higher baseline troponin   levels and results should be interpreted in conjunction with the overall   clinical context.     NOTE: Troponin I testing is performed using a  different   testing methodology at [facility] than at other   [facility]. Direct result comparisons should only   be made within the same method.   BLOOD CULTURE   BLOOD CULTURE   TROPONIN SERIES- (INITIAL, 1 HR)    Narrative:     The following orders were created for panel order Troponin I Series, High Sensitivity (0, 1 HR).  Procedure                               Abnormality         Status                     ---------                               -----------         ------                     Troponin I, High Sensiti...[[id number]]  Normal              Final result               Troponin, High Sensitivi...[[id number]]  Normal              Final result                 Please view results for these tests on the individual orders.       All other labs were within normal range or not returned as of this dictation.    Imaging  CT angio chest for pulmonary embolism   Final Result   1.Nonocclusive pulmonary emboli within multiple segmental   arteries to the right lower lobe. No evidence of right heart strain.   2.Left axillary lymphadenopathy. Correlate for possible left   upper extremity infection.   3.Hepatic steatosis.   Findings discussed with Jose F Salinas DO at approximately 0650 hours on   31 March.   Signed by Luis Zarate MD      Vascular US upper extremity venous duplex bilateral    (Results Pending)        Procedures  Procedures     EMERGENCY DEPARTMENT COURSE/MDM:     ED Course as of 03/31/25 0712   Mon Mar 31, 2025   0516 EKG independently interpreted by myself: NSR, HR 88, normal axis, normal intervals, no ST elevation [FF]      ED Course User Index  [FF] Dayday Berry MD         Diagnoses as of 03/31/25 0712   Single subsegmental pulmonary embolism without acute cor pulmonale   Occlusion of peripherally inserted central catheter (PICC) line, initial encounter        Medical Decision Making  45-year-old male comes emergency room for low-grade fever, chest pain starting in the middle  of the night, his PICC line has been slow to flush the last couple days, had a history of a left shoulder arthroplasty which was just replaced with his antibiotic spacer after an infection, was just discharged from the hospital in early March.  He is having some chest pain shooting through to his back, no shortness of breath, it is nonpleuritic, does have a history of PE after a surgery in the past as well however.  Did order viral swabs, lactic, blood cultures, basic blood work, CT angio PE study.      Independent review of labs, CBC does show a leukopenia, otherwise hemoglobin and platelets are normal, chemistry shows essentially normal electrolytes, normal renal function, slight nonspecific transaminitis, no abdominal tenderness on exam.  Lactic within normal limits, viral swabs negative, troponins negative x 2, EKG shows no acute injury pattern.  CT PE study does show a pulmonary embolism, segmental on the right, per nursing his PICC line was not working at all.  Did initiate the patient on heparin, no right heart strain per radiology.  Ordered duplex ultrasounds of both upper extremities, will be done when the ultrasound techs arrive here at 7 AM.  Patient will also need a new PICC line, patient signed out to Dr. Leon pending disposition.          Patient and or family in agreement and understanding of treatment plan.  All questions answered.      I reviewed the case with the attending ED physician. The attending ED physician agrees with the plan. Patient and/or patient´s representative was counseled regarding labs, imaging, likely diagnosis, and plan. All questions were answered.    ED Medications administered this visit:    Medications   heparin bolus from bag 7,100 Units (has no administration in time range)   heparin 25,000 Units in dextrose 5% 250 mL (100 Units/mL) infusion (premix) (has no administration in time range)   heparin bolus from bag 3,000-6,000 Units (has no administration in time range)    iohexol (OMNIPaque) 350 mg iodine/mL solution 75 mL (75 mL intravenous Given 3/31/25 6857)       Final Impression:   1. Single subsegmental pulmonary embolism without acute cor pulmonale    2. Occlusion of peripherally inserted central catheter (PICC) line, initial encounter          (Please note that portions of this note were completed with a voice recognition program.  Efforts were made to edit the dictations but occasionally words are mis-transcribed.)     Jose F Salinas DO  Resident  03/31/25 1902

## 2025-03-31 NOTE — ASSESSMENT & PLAN NOTE
S/p recent Lt shoulder debridement abd antibiotic spacer    DVT ov Rt axillary vein    ? HCAP      PLAN: Patient is being admitted on telemetry floor, c/w IV heparin drip, also noted to have DVT of right axillary vein, may need to remove PICC line, vascular consult, c/w IV Vancomycin, ID consult, c/w other supportive care, DVT prophylaxis, follow closely.

## 2025-03-31 NOTE — PROGRESS NOTES
Emergency Department Transition of Care Note       Signout   I received Ernesto Thurston in signout from Dr. Berry.  Please see the ED Provider Note for all HPI, PE and MDM up to the time of signout at 7 AM.  This is in addition to the primary record.    In brief Ernesto Thurston is an 45 y.o. male presenting for low-grade fever, chills, chest pain that started last night acutely.  PICC line on the right has been functioning less well, not flushing well.  History of left shoulder arthroplasty complicated by infection, status post spacer, on PICC line antibiotics.  Reported to previous team that is having chest pain shooting through to his back.  CT angio PE was positive for segmental right-sided PE without right heart strain, troponins negative.    At the time of signout we were awaiting:  Bilateral upper extremity DVT study    ED Course & Medical Decision Making   Medical Decision Making:  Under my care, DVT study returned showing left IJ DVT as well as right axillary DVT associate with PICC.  With this being a bilateral DVT, I did discuss briefly with vascular surgery, they indicated that their general recommendation would be no intervention other than heparinization, discussed with medicine and will defer to them if they would like a formal consult.  Lactic negative, patient admitted to the medicine service IM given the extent of VTE.    ED Course:  ED Course as of 03/31/25 0723   Mon Mar 31, 2025   0516 EKG independently interpreted by myself: NSR, HR 88, normal axis, normal intervals, no ST elevation [FF]      ED Course User Index  [FF] Dayday Berry MD         Diagnoses as of 03/31/25 0723   Single subsegmental pulmonary embolism without acute cor pulmonale   Occlusion of peripherally inserted central catheter (PICC) line, initial encounter       Disposition   As a result of their workup, the patient will require admission to the hospital.  The patient was informed of his diagnosis.  The patient was given the  opportunity to ask questions and I answered them. The patient agreed to be admitted to the hospital.    Procedures   Procedures    Patient was seen independently    Sergei Soria MD  Emergency Medicine

## 2025-03-31 NOTE — H&P
History Of Present Illness  Ernesto Thurston is a 45 y.o. M with PMH of b/l PE, Asthma, HLD, DJD, recent lt shoulder arthroplasty and subsequent infection requiring antibiotic spacer currently on IV antibiotics through PICC line presented to ER due to chest pain.  Pain was moderate in intensity which woke him up from sleep, radiating to back, no associated SOB.  Pt presented to ER where workup showed multiple segmental pulmonary embolism involving  right lower lobe.  Patient was started on IV heparin drip and admitted on telemetry floor for further care.  He denies any recent travel or sick contact.    Patient does have a history of PE after ACL repair in 2005 which required warfarin and IVC filter.  Patient's hypercoagulable workup apparently was negative at that time.      Past Medical History  He has a past medical history of Asthma, ED (erectile dysfunction), Hyperlipidemia, Infection of prosthetic shoulder joint, Mass of skin of left shoulder, Obesity, Osteoarthritis of both hips resulting from hip dysplasia, Other conditions influencing health status, PONV (postoperative nausea and vomiting), and Pulmonary embolism.    Surgical History  He has a past surgical history that includes Embolectomy; Total shoulder arthroplasty (Left, 2014); Hernia repair; Vasectomy; Anterior cruciate ligament repair; Meniscectomy (Left, 12/08/2021); Abscess drainage (Left, 02/15/2023); Abscess drainage (Left, 11/13/2024); Wound exploration (09/02/2022); and Other surgical history.     Social History  He reports that he has never smoked. He has never used smokeless tobacco. He reports that he does not currently use alcohol. He reports that he does not use drugs.    Family History  Family History   Problem Relation Name Age of Onset    Heart attack Father      Coronary artery disease Father          h/o CABG    Coronary artery disease Paternal Grandfather          Allergies  Percocet [oxycodone-acetaminophen]    Current  medications:      Current Facility-Administered Medications:     heparin 25,000 Units in dextrose 5% 250 mL (100 Units/mL) infusion (premix), 0-4,500 Units/hr, intravenous, Continuous, Jose F Salinas DO, Last Rate: 16 mL/hr at 03/31/25 0736, 1,600 Units/hr at 03/31/25 0736    heparin bolus from bag 3,000-6,000 Units, 3,000-6,000 Units, intravenous, q4h PRN, Jose F Salinas DO    vancomycin (Vancocin) in 0.9 % sodium chloride 500 mL IVPB 1,750 mg, 1,750 mg, intravenous, Once, Vivienne Foley MD, Last Rate: 285.7 mL/hr at 03/31/25 1043, 1,750 mg at 03/31/25 1043    Current Outpatient Medications:     acetaminophen (Tylenol) 500 mg tablet, Take 2 tablets (1,000 mg) by mouth every 6 hours if needed for mild pain (1 - 3)., Disp: , Rfl:     docusate sodium (Colace) 100 mg capsule, Take 1 capsule (100 mg) by mouth 2 times a day., Disp: 14 capsule, Rfl: 1    ertapenem (INVanz) IV, Infuse 50 mL (1 g) over 30 minutes into a venous catheter once every 24 hours. Once weekly labs CBC/diff, Creatinine, ESR, CRP fax to Dr. Knapp [phone]. Stop date 4/15/25., Disp: 1950 mL, Rfl: 0    meloxicam (Mobic) 15 mg tablet, Take 1 tablet (15 mg) by mouth once daily., Disp: , Rfl:     vancomycin 1 g in dextrose 5% 250 mL IVPB, Infuse 1 g at 166.7 mL/hr over 90 minutes into a venous catheter every 12 hours., Disp: , Rfl:     chlorhexidine (Peridex) 0.12 % solution, SWISH AND SPIT 15ML FOR 30 SECONDS NIGHT PRIOR TO SURGERY AND MORNING OF SURGERY (Patient not taking: Reported on 3/31/2025), Disp: 473 mL, Rfl: 0    HYDROcodone-acetaminophen (Norco) 5-325 mg tablet, Take 1 tablet by mouth every 6 hours if needed for severe pain (7 - 10). (Patient not taking: Reported on 3/31/2025), Disp: 24 tablet, Rfl: 0       Review of Systems     10 organ ROS is pertinent for history mentioned above, otherwise (-)ve     Physical Exam  HENT:      Head: Normocephalic.      Nose: Nose normal.   Eyes:      Conjunctiva/sclera: Conjunctivae normal.   Cardiovascular:  "     Rate and Rhythm: Normal rate and regular rhythm.      Heart sounds: Normal heart sounds.   Pulmonary:      Effort: Pulmonary effort is normal.      Breath sounds: Normal breath sounds.   Abdominal:      General: Abdomen is flat.      Palpations: Abdomen is soft.   Musculoskeletal:         General: Normal range of motion.      Cervical back: Neck supple.      Comments: Healing scar over lt shoulder   Neurological:      Mental Status: He is alert.              Last Recorded Vitals      Blood pressure 136/85, pulse 78, temperature 37.7 °C (99.9 °F), temperature source Temporal, resp. rate 18, height 1.651 m (5' 5\"), weight 88.5 kg (195 lb), SpO2 97%.    Relevant Results     Results for orders placed or performed during the hospital encounter of 03/31/25 (from the past 24 hours)   CBC and Auto Differential   Result Value Ref Range    WBC 4.0 (L) 4.4 - 11.3 x10*3/uL    nRBC 0.0 0.0 - 0.0 /100 WBCs    RBC 5.35 4.50 - 5.90 x10*6/uL    Hemoglobin 14.8 13.5 - 17.5 g/dL    Hematocrit 43.3 41.0 - 52.0 %    MCV 81 80 - 100 fL    MCH 27.7 26.0 - 34.0 pg    MCHC 34.2 32.0 - 36.0 g/dL    RDW 13.2 11.5 - 14.5 %    Platelets 201 150 - 450 x10*3/uL    Neutrophils % 63.9 40.0 - 80.0 %    Immature Granulocytes %, Automated 1.2 (H) 0.0 - 0.9 %    Lymphocytes % 14.7 13.0 - 44.0 %    Monocytes % 13.5 2.0 - 10.0 %    Eosinophils % 6.2 0.0 - 6.0 %    Basophils % 0.5 0.0 - 2.0 %    Neutrophils Absolute 2.56 1.20 - 7.70 x10*3/uL    Immature Granulocytes Absolute, Automated 0.05 0.00 - 0.70 x10*3/uL    Lymphocytes Absolute 0.59 (L) 1.20 - 4.80 x10*3/uL    Monocytes Absolute 0.54 0.10 - 1.00 x10*3/uL    Eosinophils Absolute 0.25 0.00 - 0.70 x10*3/uL    Basophils Absolute 0.02 0.00 - 0.10 x10*3/uL   Comprehensive Metabolic Panel   Result Value Ref Range    Glucose 107 (H) 74 - 99 mg/dL    Sodium 134 (L) 136 - 145 mmol/L    Potassium 3.8 3.5 - 5.3 mmol/L    Chloride 102 98 - 107 mmol/L    Bicarbonate 23 21 - 32 mmol/L    Anion Gap 13 10 - 20 " mmol/L    Urea Nitrogen 15 6 - 23 mg/dL    Creatinine 0.78 0.50 - 1.30 mg/dL    eGFR >90 >60 mL/min/1.73m*2    Calcium 9.1 8.6 - 10.3 mg/dL    Albumin 4.3 3.4 - 5.0 g/dL    Alkaline Phosphatase 111 33 - 120 U/L    Total Protein 7.0 6.4 - 8.2 g/dL    AST 55 (H) 9 - 39 U/L    Bilirubin, Total 0.7 0.0 - 1.2 mg/dL    ALT 94 (H) 10 - 52 U/L   Lactate   Result Value Ref Range    Lactate 1.5 0.4 - 2.0 mmol/L   Blood Culture    Specimen: Peripheral Venipuncture; Blood culture   Result Value Ref Range    Blood Culture Loaded on Instrument - Culture in progress    Blood Culture    Specimen: Peripheral Venipuncture; Blood culture   Result Value Ref Range    Blood Culture Loaded on Instrument - Culture in progress    Troponin I, High Sensitivity, Initial   Result Value Ref Range    Troponin I, High Sensitivity 5 0 - 20 ng/L   Light Blue Top   Result Value Ref Range    Extra Tube Hold for add-ons.    Lavender Top   Result Value Ref Range    Extra Tube Hold for add-ons.    Sars-CoV-2, Influenza A/B and RSV PCR   Result Value Ref Range    Coronavirus 2019, PCR Not Detected Not Detected    Flu A Result Not Detected Not Detected    Flu B Result Not Detected Not Detected    RSV PCR Not Detected Not Detected   Troponin, High Sensitivity, 1 Hour   Result Value Ref Range    Troponin I, High Sensitivity 4 0 - 20 ng/L   Vascular US upper extremity venous duplex bilateral   Result Value Ref Range    BSA 2.01 m2          Radiology     CT angio chest for pulmonary embolism    Result Date: 3/31/2025  1.Nonocclusive pulmonary emboli within multiple segmental arteries to the right lower lobe. No evidence of right heart strain. 2.Left axillary lymphadenopathy. Correlate for possible left upper extremity infection. 3.Hepatic steatosis. Findings discussed with Jose F Salinas DO at approximately 0650 hours on 31 March. Signed by Luis Zarate MD    CT shoulder left wo IV contrast    Result Date: 3/5/2025    Postsurgical changes of explantation of  the left shoulder arthroplasty with antibiotic impregnated spacer.   Numerous areas of soft tissue gas throughout the left shoulder, the most prominent anteriorly adjacent to the pectoralis musculature. This looks to be gas within a sizable fluid collection. This could be either seroma with gas or potentially abscess. Correlate with any signs and symptoms of suspected soft tissue infection in the region.   New large area of left basilar consolidation suggestive of pneumonia.   MACRO: Critical Finding:  Report. Notification was initiated on 3/5/2025 at 1:07 pm by  Vahe Clark.  (**-YCF-**)   Signed by: Vahe Clark 3/5/2025 1:07 PM Dictation workstation:   [id number]    XR shoulder left 1 view    Result Date: 3/4/2025  Interval hardware explant with antibiotic spacer placement.   I personally reviewed the images/study and I agree with the findings as stated by Radiology resident.   MACRO: None   Signed by: Deborah Sol 3/4/2025 5:13 PM Dictation workstation:   [id number]       Assessment/Plan   Assessment & Plan  Single subsegmental pulmonary embolism without acute cor pulmonale    S/p recent Lt shoulder debridement abd antibiotic spacer    DVT ov Rt axillary vein    ? HCAP      PLAN: Patient is being admitted on telemetry floor, c/w IV heparin drip, also noted to have DVT of right axillary vein, may need to remove PICC line, vascular consult, c/w IV Vancomycin, ID consult, c/w other supportive care, DVT prophylaxis, follow closely.               Blaine Rubin MD

## 2025-03-31 NOTE — ED TRIAGE NOTES
Patient presents to ED with c/o sweats, chest pain after left arm surgery (3/4). Patient states that s/sx have been going on since about midnight. Patient has PICC in right arm and states that antibiotics have been taking longer than normal to infuse over the last two days.

## 2025-04-01 ENCOUNTER — APPOINTMENT (OUTPATIENT)
Dept: CARDIOLOGY | Facility: HOSPITAL | Age: 45
End: 2025-04-01
Payer: COMMERCIAL

## 2025-04-01 ENCOUNTER — APPOINTMENT (OUTPATIENT)
Dept: PHYSICAL THERAPY | Facility: CLINIC | Age: 45
End: 2025-04-01
Payer: COMMERCIAL

## 2025-04-01 LAB
ANION GAP SERPL CALC-SCNC: 13 MMOL/L (ref 10–20)
ATRIAL RATE: 88 BPM
BUN SERPL-MCNC: 18 MG/DL (ref 6–23)
CALCIUM SERPL-MCNC: 8.3 MG/DL (ref 8.6–10.3)
CHLORIDE SERPL-SCNC: 104 MMOL/L (ref 98–107)
CO2 SERPL-SCNC: 23 MMOL/L (ref 21–32)
CREAT SERPL-MCNC: 0.91 MG/DL (ref 0.5–1.3)
EGFRCR SERPLBLD CKD-EPI 2021: >90 ML/MIN/1.73M*2
ERYTHROCYTE [DISTWIDTH] IN BLOOD BY AUTOMATED COUNT: 13.4 % (ref 11.5–14.5)
GLUCOSE SERPL-MCNC: 108 MG/DL (ref 74–99)
HCT VFR BLD AUTO: 39.1 % (ref 41–52)
HGB BLD-MCNC: 13.3 G/DL (ref 13.5–17.5)
LACTATE SERPL-SCNC: 1.2 MMOL/L (ref 0.4–2)
MCH RBC QN AUTO: 27.8 PG (ref 26–34)
MCHC RBC AUTO-ENTMCNC: 34 G/DL (ref 32–36)
MCV RBC AUTO: 82 FL (ref 80–100)
NRBC BLD-RTO: 0 /100 WBCS (ref 0–0)
P AXIS: 38 DEGREES
P OFFSET: 201 MS
P ONSET: 148 MS
PLATELET # BLD AUTO: 164 X10*3/UL (ref 150–450)
POTASSIUM SERPL-SCNC: 3.7 MMOL/L (ref 3.5–5.3)
PR INTERVAL: 154 MS
Q ONSET: 225 MS
QRS COUNT: 14 BEATS
QRS DURATION: 74 MS
QT INTERVAL: 344 MS
QTC CALCULATION(BAZETT): 416 MS
QTC FREDERICIA: 391 MS
R AXIS: -2 DEGREES
RBC # BLD AUTO: 4.79 X10*6/UL (ref 4.5–5.9)
SODIUM SERPL-SCNC: 136 MMOL/L (ref 136–145)
T AXIS: 46 DEGREES
T OFFSET: 397 MS
UFH PPP CHRO-ACNC: 0.3 IU/ML
VANCOMYCIN SERPL-MCNC: 20.5 UG/ML (ref 5–20)
VENTRICULAR RATE: 88 BPM
WBC # BLD AUTO: 2.9 X10*3/UL (ref 4.4–11.3)

## 2025-04-01 PROCEDURE — 2500000004 HC RX 250 GENERAL PHARMACY W/ HCPCS (ALT 636 FOR OP/ED): Performed by: NURSE PRACTITIONER

## 2025-04-01 PROCEDURE — 85520 HEPARIN ASSAY: CPT | Performed by: INTERNAL MEDICINE

## 2025-04-01 PROCEDURE — 93970 EXTREMITY STUDY: CPT

## 2025-04-01 PROCEDURE — 93970 EXTREMITY STUDY: CPT | Performed by: NURSE PRACTITIONER

## 2025-04-01 PROCEDURE — 85027 COMPLETE CBC AUTOMATED: CPT | Performed by: NURSE PRACTITIONER

## 2025-04-01 PROCEDURE — 80048 BASIC METABOLIC PNL TOTAL CA: CPT | Performed by: NURSE PRACTITIONER

## 2025-04-01 PROCEDURE — 83605 ASSAY OF LACTIC ACID: CPT | Performed by: NURSE PRACTITIONER

## 2025-04-01 PROCEDURE — 36415 COLL VENOUS BLD VENIPUNCTURE: CPT | Performed by: NURSE PRACTITIONER

## 2025-04-01 PROCEDURE — 80202 ASSAY OF VANCOMYCIN: CPT

## 2025-04-01 PROCEDURE — 2500000001 HC RX 250 WO HCPCS SELF ADMINISTERED DRUGS (ALT 637 FOR MEDICARE OP)

## 2025-04-01 PROCEDURE — 2060000001 HC INTERMEDIATE ICU ROOM DAILY

## 2025-04-01 PROCEDURE — 99232 SBSQ HOSP IP/OBS MODERATE 35: CPT | Performed by: NURSE PRACTITIONER

## 2025-04-01 RX ORDER — VANCOMYCIN HYDROCHLORIDE 1.25 G/250ML
1250 INJECTION, SOLUTION INTRAVITREAL EVERY 12 HOURS
Status: DISCONTINUED | OUTPATIENT
Start: 2025-04-01 | End: 2025-04-01

## 2025-04-01 RX ADMIN — ERTAPENEM SODIUM 1 G: 1 INJECTION, POWDER, LYOPHILIZED, FOR SOLUTION INTRAMUSCULAR; INTRAVENOUS at 20:57

## 2025-04-01 RX ADMIN — VANCOMYCIN HYDROCHLORIDE 1250 MG: 1.25 INJECTION, SOLUTION INTRAVITREAL at 10:27

## 2025-04-01 RX ADMIN — ACETAMINOPHEN 650 MG: 325 TABLET ORAL at 05:15

## 2025-04-01 RX ADMIN — HEPARIN SODIUM 1400 UNITS/HR: 10000 INJECTION, SOLUTION INTRAVENOUS at 17:03

## 2025-04-01 RX ADMIN — SODIUM CHLORIDE 1000 ML: 9 INJECTION, SOLUTION INTRAVENOUS at 20:25

## 2025-04-01 RX ADMIN — ACETAMINOPHEN 650 MG: 325 TABLET ORAL at 18:12

## 2025-04-01 ASSESSMENT — COGNITIVE AND FUNCTIONAL STATUS - GENERAL
DAILY ACTIVITIY SCORE: 21
DRESSING REGULAR LOWER BODY CLOTHING: A LITTLE
MOBILITY SCORE: 24
HELP NEEDED FOR BATHING: A LITTLE
DRESSING REGULAR UPPER BODY CLOTHING: A LITTLE
MOBILITY SCORE: 24
DRESSING REGULAR LOWER BODY CLOTHING: A LITTLE
DRESSING REGULAR UPPER BODY CLOTHING: A LITTLE
DAILY ACTIVITIY SCORE: 21
HELP NEEDED FOR BATHING: A LITTLE

## 2025-04-01 ASSESSMENT — PAIN SCALES - GENERAL
PAINLEVEL_OUTOF10: 0 - NO PAIN

## 2025-04-01 ASSESSMENT — PAIN - FUNCTIONAL ASSESSMENT
PAIN_FUNCTIONAL_ASSESSMENT: 0-10

## 2025-04-01 NOTE — CARE PLAN
The clinical goals for the shift include patient will remain hemodynamically stable throughout shift. The patient met this goal     S: Patient admitted 3/31 for PE  B: History of: ACL & shoulder repairs, bilateral PE  A: Patient A&Ox4, remains in NSR and on RA. Medicated per orders throughout shift. Heparin drip continued at 1400 units/hr, 2 x therapeutic heparin assays this shift.   R: Patient from home with wife, to return at discharge. Call light left within reach

## 2025-04-01 NOTE — ASSESSMENT & PLAN NOTE
S/p recent Lt shoulder debridement abd antibiotic spacer    DVT ov Rt axillary vein    ? HCAP      PLAN: Patient remains on IV heparin drip, may change to oral anticoagulants in a.m., c/w IV ertapenem and vancomycin, monitor trough level, med list labs reviewed, discussed with nursing and wife in room, follow closely.

## 2025-04-01 NOTE — ED PROCEDURE NOTE
Procedure  Critical Care    Performed by: Dayday Berry MD  Authorized by: Dayday Berry MD    Critical care provider statement:     Critical care time (minutes):  33    Critical care time was exclusive of:  Separately billable procedures and treating other patients and teaching time    Critical care was necessary to treat or prevent imminent or life-threatening deterioration of the following conditions:  Circulatory failure (acute PE)    Critical care was time spent personally by me on the following activities:  Development of treatment plan with patient or surrogate, evaluation of patient's response to treatment, examination of patient, obtaining history from patient or surrogate, ordering and performing treatments and interventions, ordering and review of laboratory studies, ordering and review of radiographic studies, pulse oximetry, re-evaluation of patient's condition and review of old charts               Dayday Berry MD  04/01/25 [id number]

## 2025-04-01 NOTE — PROGRESS NOTES
04/01/25 1146   Discharge Planning   Living Arrangements Spouse/significant other   Support Systems Spouse/significant other   Type of Residence Private residence   Home or Post Acute Services None   Expected Discharge Disposition Home     Met with patient and spouse at bedside. Admitted for occlusion of PICC and multiple PE. Pt lives with spouse and was active with [facility] for home infusion for a shoulder joint infection. Pt was independent with no DME. Was going to outpatient therapy. PCP is Luis Louis. Pt feels he is able to manage his health and understands his medications. Spouse has been providing transportation since surgery. No financial concerns. PICC has been removed and pt plans to discharge home on oral antibiotics. Family will provide transport.

## 2025-04-01 NOTE — CARE PLAN
Problem: Pain - Adult  Goal: Verbalizes/displays adequate comfort level or baseline comfort level  Outcome: Progressing   The patient's goals for the shift include      The clinical goals for the shift include Patient will have no chest pain through out this shift    Over the shift, the patient did not make progress toward the following goals. Patient continued on heparin gtt, will change to po tomorrow am. Vascular and ID in to see patient today. IV antibiotics continued.

## 2025-04-01 NOTE — PROGRESS NOTES
Vancomycin Dosing by Pharmacy- FOLLOW UP    Ernesto Thurston is a 45 y.o. year old male who Pharmacy has been consulted for vancomycin dosing for cellulitis, skin and soft tissue. Based on the patient's indication and renal status this patient is being dosed based on a goal AUC of 400-600.     Renal function is currently stable.    Current vancomycin dose: 1750 mg given every 12 hours    Estimated vancomycin AUC on current dose: 718 mg/L.hr     Visit Vitals  /81 (BP Location: Left arm, Patient Position: Lying)   Pulse 88   Temp 37.8 °C (100 °F) (Temporal)   Resp 16        Lab Results   Component Value Date    CREATININE 0.91 2025    CREATININE 0.78 2025    CREATININE 0.97 2025        Patient weight is as follows:   Vitals:    25 0456   Weight: 88.5 kg (195 lb)       Cultures:  No results found for the encounter in last 14 days.       No intake/output data recorded.  I/O during current shift:  I/O this shift:  In: 500 [IV Piggyback:500]  Out: -     Temp (24hrs), Av.9 °C (100.2 °F), Min:37.6 °C (99.7 °F), Max:38.2 °C (100.8 °F)      Assessment/Plan    Above goal AUC. Orders placed for new vancomcyin regimen of 1250 every 12 hours to begin at 1000.    This dosing regimen is predicted by [employer] to result in the following pharmacokinetic parameters:  Loading dose: N/A  Regimen: 1250 mg IV every 12 hours.  Start time: 09:50 on 2025  Exposure target: AUC24 (range)400-600 mg/L.hr   YFN33-01: 501 mg/L.hr  AUC24,ss: 516 mg/L.hr  Probability of AUC24 > 400: 85 %  Ctrough,ss: 16.1 mg/L  Probability of Ctrough,ss > 20: 28 %    The next level will be obtained on  at 0500. May be obtained sooner if clinically indicated.   Will continue to monitor renal function daily while on vancomycin and order serum creatinine at least every 48 hours if not already ordered.  Follow for continued vancomycin needs, clinical response, and signs/symptoms of toxicity.       JYOTI STONE

## 2025-04-01 NOTE — CONSULTS
Consults  Reason for Consult:  Evaluation and management of left prosthetic shoulder infection    Patient is seen at the request of Angella Watkins    Subjective   History of Present Illness:  Ernesto Thurston is a 45 y.o. male who presented with low-grade fevers.  He has had a complicated medical course over the last several months.  He had a left shoulder replacement in 2015 that was complicated by a Serratia marcescens infection in 2022 that required incision and drainage.  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  He relapsed in November 2024 and underwent further surgical debridement.  Cultures at that time grew Serratia marcescens and cutibacterium acnes.  He then was treated with oral Levaquin and doxycycline but ultimately decided to have the prosthesis explanted on 3/4/2025.  He had placement of a spacer and was discharged from the hospital on 3/7/2025 on IV ertapenem with plans to continue up through 4/15/2025.  He then developed the fevers and his PICC line became slow to flush so he came into the emergency department.  On arrival he had a temperature of 37.7 and soon spiked a fever to 38.1.  His white blood count was 4.0 and his creatinine was 0.8.  He went for a CT angiogram of the chest which showed a PE in the right lower lobe and lower extremity Doppler ultrasound showed DVTs in both of his arms.  He has been placed on vancomycin and ertapenem.  He is being anticoagulated.  I am seeing him in the emergency department.    Past Medical History:   has a past medical history of Asthma, ED (erectile dysfunction), Hyperlipidemia, Infection of prosthetic shoulder joint, Mass of skin of left shoulder, Obesity, Osteoarthritis of both hips resulting from hip dysplasia, Other conditions influencing health status, PONV (postoperative nausea and vomiting), and Pulmonary embolism.    has a past surgical history that includes  Embolectomy; Total shoulder arthroplasty (Left, 2014); Hernia repair; Vasectomy; Anterior cruciate ligament repair; Meniscectomy (Left, 12/08/2021); Abscess drainage (Left, 02/15/2023); Abscess drainage (Left, 11/13/2024); Wound exploration (09/02/2022); and Other surgical history.     Social History:   reports that he has never smoked. He has never used smokeless tobacco. He reports that he does not currently use alcohol. He reports that he does not use drugs.     Family History:  No sick contacts    Review of Systems:  Low-grade fevers    Allergies:  Percocet [oxycodone-acetaminophen]      Objective   Current Facility-Administered Medications   Medication Dose Route Frequency Provider Last Rate Last Admin    docusate sodium (Colace) capsule 100 mg  100 mg oral BID LESLEE Maldonado        ertapenem (INVanz) 1 g in sodium chloride 0.9% IV 50 mL  1 g intravenous q24h LESLEE Maldonado        heparin 25,000 Units in dextrose 5% 250 mL (100 Units/mL) infusion (premix)  0-4,500 Units/hr intravenous Continuous Jose F Salinas,  14 mL/hr at 03/31/25 1845 1,400 Units/hr at 03/31/25 1845    heparin bolus from bag 3,000-6,000 Units  3,000-6,000 Units intravenous q4h PRN Jose F Rita, DO        polyethylene glycol (Glycolax, Miralax) packet 17 g  17 g oral Daily LESLEE Maldonado        vancomycin (Vancocin) in 0.9 % sodium chloride 500 mL IVPB 1,750 mg  1,750 mg intravenous q12h Florencio Fu PharmD        vancomycin (Vancocin) pharmacy to dose - pharmacy monitoring   miscellaneous Daily PRN LESLEE Maldonado           Physical Exam:  BP (!) 147/99   Pulse 96   Temp 37.6 °C (99.7 °F) (Temporal)   Resp 18   Wt 88.5 kg (195 lb)   SpO2 98%    General: no acute distress, lying in bed  HEENT: pink pharynx  CVS: RRR  Resp: decreased breath sounds in bases  ABD: soft, NT, ND  EXT: Left shoulder surgical site is well-healed  Skin: Right upper extremity PICC line is clean  dry and intact    Relevant Results:    Labs:  Results from last 72 hours   Lab Units 03/31/25  0517   SODIUM mmol/L 134*   POTASSIUM mmol/L 3.8   CHLORIDE mmol/L 102   BUN mg/dL 15   CREATININE mg/dL 0.78     Results from last 72 hours   Lab Units 03/31/25  0517   WBC AUTO x10*3/uL 4.0*   HEMOGLOBIN g/dL 14.8   HEMATOCRIT % 43.3   PLATELETS AUTO x10*3/uL 201       Microbiology data: I have personally and independently reviewed and intrepreted the lab results  3/4/2025 surgical cultures show cutibacterium acnes  3/30/2025 influenza swab negative; COVID-19 test negative; RSV swab negative  3/30/2025 blood culture show no growth so far    Imaging data: I have personally and independently reviewed and interpreted the imaging studies  3/31/2025 CT angiogram chest shows a PE in the right lower lobe  3/31/2025 upper extremity Doppler ultrasound shows DVT in axillary vein of the left upper extremity and a DVT in the internal jugular vein     Assessment/Plan     MY IMPRESSION & RECOMMENDATIONS:  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  I would recommend that we continue the ertapenem while he is hospitalized.  The plan had been for him to complete a 6-week course up through 4/15/2025.  Vascular surgery is recommending removal of the PICC line.  He does have a history of blood clots.  If his PICC line is removed, I would then likely discharge him for the remainder of his course on oral agents.    -Continue ertapenem for now  -Will likely stop the vancomycin soon  -Await blood cultures  -Monitor fever curve    Other issues:  #Fevers, likely due to RUE DVT and RLL PE  #Asthma  #Hyperlipidemia  #History of PE after ACL repair in 2005 status post IVC filter  #Osteoarthritis status post left shoulder replacement in 2015 complicated by multiple recent infections    ~I have personally and independently reviewed and interpreted the laboratory tests, imaging studies,  and the documentation from other healthcare providers.  I am monitoring for side effects and toxicity from vancomycin and ertapenem, which can include rash, diarrhea, bone marrow suppression, fatigue, and nephrotoxicity.  His prognosis is uncertain due to the recurrent problems that he has had with his left shoulder.       Shaji Bustamante MD

## 2025-04-01 NOTE — PROGRESS NOTES
Subjective Data:  Laying in bed. Wife at bedside. Midsternal chest pain with dull ache radiation to back. Denies palptiations. Denies SOB. Denies orthopnea/PND. Denies lightheadedness, dizziness, and syncope. Denies edema. Recalls having left internal jugular access during prior surgery.     Overnight Events:    Febrile. Hemodynamically stable. PICC line removed earlier this morning.      Objective Data:  Last Recorded Vitals:  Vitals:    04/01/25 0800 04/01/25 0825 04/01/25 1200 04/01/25 1218   BP:  127/82  126/86   BP Location:  Left arm  Left arm   Patient Position:  Lying  Sitting   Pulse: 74 84 86 86   Resp:  20     Temp:  37.4 °C (99.3 °F)  37.3 °C (99.1 °F)   TempSrc:  Temporal  Temporal   SpO2:  95%  95%   Weight:       Height:         Last I/O:  I/O last 3 completed shifts:  In: 795 (8.5 mL/kg) [I.V.:295 (3.2 mL/kg); IV Piggyback:500]  Out: - (0 mL/kg)   Weight: 93.5 kg     Physical Exam  Vitals reviewed.   Constitutional:       Appearance: Normal appearance. He is overweight.   HENT:      Head: Normocephalic and atraumatic.      Nose: Nose normal.      Mouth/Throat:      Mouth: Mucous membranes are moist.   Eyes:      Conjunctiva/sclera: Conjunctivae normal.   Neck:      Vascular: No hepatojugular reflux or JVD.   Cardiovascular:      Rate and Rhythm: Normal rate and regular rhythm.      Pulses:           Radial pulses are 2+ on the right side and 2+ on the left side.        Posterior tibial pulses are 2+ on the right side and 2+ on the left side.   Pulmonary:      Effort: Pulmonary effort is normal.      Breath sounds: Normal breath sounds.   Musculoskeletal:      Right lower leg: No edema.      Left lower leg: No edema.   Feet:      Right foot:      Skin integrity: No erythema or warmth.      Left foot:      Skin integrity: No erythema or warmth.   Skin:     General: Skin is warm and dry.   Neurological:      Mental Status: He is alert and oriented to person, place, and time.   Psychiatric:         Mood  and Affect: Mood normal.     Last Labs:  CBC - 4/1/2025:  3:06 AM  2.9 13.3 164    39.1      CMP - 4/1/2025:  3:06 AM  8.3 7.0 55 --- 0.7   _ 4.3 94 111      PTT - No results in last year.  1.1   11.7 _     TROPHS   Date/Time Value Ref Range Status   03/31/2025 06:10 AM 4 0 - 20 ng/L Final   03/31/2025 05:17 AM 5 0 - 20 ng/L Final      Diagnostic Imaging:   VASC US UPPER EXTREMITY VENOUS DUPLEX BILATERAL     Patient Name:     SHELDON CEDILLO ARABELLA Reading Physician:  [id number] Lambert Bronson MD  Study Date:       3/31/2025      Ordering Provider:  [id number] LAURY NERI  MRN/PID:          [id number]       Fellow:  Accession#:       [id number]   Technologist:       Janice Montanez RVT  YOB: 1980      Technologist 2:  Gender:           M              Encounter#:         [id number]  Admission Status: Emergency      Location Performed: [facility]        Diagnosis/ICD: Left arm swelling-M79.89; Right arm swelling-M79.89  Indication:    Limb swelling  CPT Codes:     27752 Peripheral venous duplex scan for DVT complete        Pertinent History: Recent Surgery.        **CRITICAL RESULT**  Critical Result: DVT in R Axil V and L IJV  Notification called to Sergei Soria MD on 3/31/2025 Acknowledged critical results notification communicated via secure chat by Janice Montanez RVT.     PRELIMINARY CONCLUSIONS:     Right Upper Venous: There is acute non-occlusive deep vein thrombosis visualized in the axillary vein. Additional Findings; IV Line. visualized in right basilic vein, axcillary vein and subclavian vein.  Left Upper Venous: There is acute occlusive deep vein thrombosis visualized in the internal jugular vein.     Imaging & Doppler Findings:     Right               Compressible      Thrombus              Flow  Internal Jugular        Yes             None         Spontaneous/Phasic  Subclavian              Yes             None  Subclavian Proximal     Yes             None         Spontaneous/Phasic  Subclavian Mid           Yes             None  Subclavian Distal       Yes             None         Spontaneous/Phasic  Axillary                 No      Acute non-occlusive     Continuous  Brachial                Yes             None  Cephalic                Yes             None  Basilic                 Yes             None        Left                Compress    Thrombus            Flow  Internal Jugular       No    Acute occlusive        None  Subclavian            Yes         None  Subclavian Proximal   Yes         None       Spontaneous/Phasic  Subclavian Mid        Yes         None  Subclavian Distal     Yes         None       Spontaneous/Phasic  Axillary              Yes         None       Spontaneous/Phasic  Brachial              Yes         None  Cephalic              Yes         None  Basilic               Yes         None        VASCULAR PRELIMINARY REPORT  completed by Janice Montanez RVT on 3/31/2025 at 9:54:34 AM     CT Angiogram of the Chest; 3/31/2025 6:07 AM.  INDICATION:  Chest pain and fever.  Recent shoulder surgery.  Presents with PICC  line in the right arm which is not functioning properly.  Concern for  pulmonary embolism/pneumonia.    COMPARISON:  Chest and rib radiographs 10/20/2021.  ACCESSION NUMBER(S):  [id number]  ORDERING CLINICIAN:  PUNEET THORNTON  TECHNIQUE:  CTA of the chest was performed with intravenous contrast.   Images are reviewed and processed at a workstation according to the CT  angiogram protocol with 3-D and/or MIP post processing imaging  generated.  Omnipaque 350, 75 mL was administered intravenously.   Automated mA/kV exposure control was utilized and patient examination  was performed in strict accordance with principles of ALARA.  FINDINGS:   Lungs and pleura: The lungs are clear.  No pleural effusion or  pneumothorax.  Heart: Heart size is normal.  No significant coronary artery  calcifications are seen.  Vasculature: The aorta is not enlarged. The pulmonary trunk is not  enlarged. Nonocclusive  pulmonary emboli are seen within multiple  segmental arteries to the right lower lobe.  Mediastinum: The esophagus is unremarkable. No visible hiatal hernia.  Lymph nodes: Left axillary lymphadenopathy is seen with short axis  measurement up to 1.2 cm. No mediastinal adenopathy.  Chest wall: Unremarkable.  Thyroid gland: The thyroid gland is normal in appearance.  Upper abdomen: There is significant hepatic steatosis. The imaged  upper abdomen is otherwise unremarkable.  Musculoskeletal: Mild spondylitic changes are seen within the spine.  IMPRESSION:  1.Nonocclusive pulmonary emboli within multiple segmental  arteries to the right lower lobe. No evidence of right heart strain.  2.Left axillary lymphadenopathy. Correlate for possible left  upper extremity infection.  3.Hepatic steatosis.  Findings discussed with Jose F Salinas DO at approximately 0650 hours on  31 March.  Signed by Luis Zarate MD    Inpatient Medications:  Scheduled medications   Medication Dose Route Frequency    docusate sodium  100 mg oral BID    ertapenem  1 g intravenous q24h    polyethylene glycol  17 g oral Daily    vancomycin  1,250 mg intravenous q12h     PRN medications   Medication    acetaminophen    heparin    vancomycin     Continuous Medications   Medication Dose Last Rate    heparin  0-4,500 Units/hr 1,400 Units/hr (04/01/25 1327)      Assessment/Plan   RLL PE, Right axillary vein DVT, and Left internal jugular vein DVT  Midsternal chest pain with dull ache radiation to back. Denies palptiations. Denies SOB. Denies orthopnea/PND. Denies lightheadedness, dizziness, and syncope. Denies edema.   RRR on exam. No JVD/HJR. Lungs CTAB. SpO2 95% on RA. No peripheral edema/erythema.   Hx of bilateral PEs in 2005 s/p ACL repair. IVC filter placed and treated w/Warfarin. Hypercoaguable evaluation negative per patient.  Has a sister w/hx of DVT, hypercoagulable workup negative. Patient had recent shoulder surgery w/PICC line for IV antibiotics.  No known malignancy/chemotherapy. No hormonal therapy.   Vascular US UE Venous Duplex Bilateral 3/31/25 Right Upper Venous: There is acute non-occlusive deep vein thrombosis visualized in the axillary vein. Additional Findings; IV Line. visualized in right basilic vein, axcillary vein and subclavian vein. Left Upper Venous: There is acute occlusive deep vein thrombosis visualized in the internal jugular vein.  CT for PE 3/31/25 Nonocclusive pulmonary emboli within multiple segmental arteries to the right lower lobe. No evidence of right heart strain.  Order for Vascular US LE Venous Duplex w/ no evidence of BLE DVT.   Patient had previous IVC filter placed in 2005 that was not removed.   PICC line removed this morning. Continue Heparin gtt, transition to DOAC as tolerated.   Follow up outpatient with Vascular Medicine.      Discussed plan of care with Dr. Ashanti Perez.   I spent 35 minutes in the professional and overall care of this patient.     Kayla Nicolas, KAREN-CNP  Vascular Surgery  [facility]  [facility]

## 2025-04-01 NOTE — PROGRESS NOTES
Ernesto Thurston is a 45 y.o. male on day 1 of admission presenting with Single subsegmental pulmonary embolism without acute cor pulmonale, also noted to have a fever, was evaluated by ID currently on IV vancomycin and ertapenem    Subjective   No cp/sob       Objective         Current Facility-Administered Medications:     acetaminophen (Tylenol) tablet 650 mg, 650 mg, oral, q6h PRN, LESLEE Fried, 650 mg at 04/01/25 0515    docusate sodium (Colace) capsule 100 mg, 100 mg, oral, BID, LESLEE Maldonado    ertapenem (INVanz) 1 g in sodium chloride 0.9% IV 50 mL, 1 g, intravenous, q24h, LESLEE Maldonado, Stopped at 03/31/25 2158    heparin 25,000 Units in dextrose 5% 250 mL (100 Units/mL) infusion (premix), 0-4,500 Units/hr, intravenous, Continuous, Jose F Salinas DO, Last Rate: 14 mL/hr at 04/01/25 1120, 1,400 Units/hr at 04/01/25 1120    heparin bolus from bag 3,000-6,000 Units, 3,000-6,000 Units, intravenous, q4h PRN, Jose F Salinas DO    polyethylene glycol (Glycolax, Miralax) packet 17 g, 17 g, oral, Daily, LESLEE Maldonado    vancomycin (Vancocin) pharmacy to dose - pharmacy monitoring, , miscellaneous, Daily PRN, LESLEE Maldonado    vancomycin 1,250 mg in D5W  mL, 1,250 mg, intravenous, q12h, LESLEE Maldonado, Stopped at 04/01/25 1100       Physical Exam  HENT:      Head: Normocephalic.   Eyes:      Conjunctiva/sclera: Conjunctivae normal.   Cardiovascular:      Rate and Rhythm: Normal rate and regular rhythm.      Heart sounds: Normal heart sounds.   Pulmonary:      Effort: Pulmonary effort is normal.      Breath sounds: Normal breath sounds.   Abdominal:      General: Abdomen is flat.      Palpations: Abdomen is soft.   Musculoskeletal:         General: Normal range of motion.      Cervical back: Neck supple.   Skin:     General: Skin is warm and dry.   Neurological:      Mental Status: He is alert. Mental status is at baseline.  "  Psychiatric:         Mood and Affect: Mood normal.           Last Recorded Vitals  Blood pressure 127/82, pulse 86, temperature 37.4 °C (99.3 °F), temperature source Temporal, resp. rate 20, height 1.651 m (5' 5\"), weight 93.5 kg (206 lb 2.1 oz), SpO2 95%.  Intake/Output last 3 Shifts:  I/O last 3 completed shifts:  In: 795 (8.5 mL/kg) [I.V.:295 (3.2 mL/kg); IV Piggyback:500]  Out: - (0 mL/kg)   Weight: 93.5 kg           Labs:       Results for orders placed or performed during the hospital encounter of 03/31/25 (from the past 24 hours)   Heparin Assay, UFH   Result Value Ref Range    Heparin Unfractionated 0.7 See Comment Below for Therapeutic Ranges IU/mL   Heparin Assay, UFH   Result Value Ref Range    Heparin Unfractionated 1.0 See Comment Below for Therapeutic Ranges IU/mL   Heparin Assay   Result Value Ref Range    Heparin Unfractionated 0.3 See Comment Below for Therapeutic Ranges IU/mL   CBC   Result Value Ref Range    WBC 2.9 (L) 4.4 - 11.3 x10*3/uL    nRBC 0.0 0.0 - 0.0 /100 WBCs    RBC 4.79 4.50 - 5.90 x10*6/uL    Hemoglobin 13.3 (L) 13.5 - 17.5 g/dL    Hematocrit 39.1 (L) 41.0 - 52.0 %    MCV 82 80 - 100 fL    MCH 27.8 26.0 - 34.0 pg    MCHC 34.0 32.0 - 36.0 g/dL    RDW 13.4 11.5 - 14.5 %    Platelets 164 150 - 450 x10*3/uL   Basic Metabolic Panel   Result Value Ref Range    Glucose 108 (H) 74 - 99 mg/dL    Sodium 136 136 - 145 mmol/L    Potassium 3.7 3.5 - 5.3 mmol/L    Chloride 104 98 - 107 mmol/L    Bicarbonate 23 21 - 32 mmol/L    Anion Gap 13 10 - 20 mmol/L    Urea Nitrogen 18 6 - 23 mg/dL    Creatinine 0.91 0.50 - 1.30 mg/dL    eGFR >90 >60 mL/min/1.73m*2    Calcium 8.3 (L) 8.6 - 10.3 mg/dL   Vancomycin   Result Value Ref Range    Vancomycin 20.5 (H) 5.0 - 20.0 ug/mL   Heparin Assay   Result Value Ref Range    Heparin Unfractionated 0.3 See Comment Below for Therapeutic Ranges IU/mL        Radiology  CT angio chest for pulmonary embolism    Result Date: 3/31/2025  1.Nonocclusive pulmonary emboli " within multiple segmental arteries to the right lower lobe. No evidence of right heart strain. 2.Left axillary lymphadenopathy. Correlate for possible left upper extremity infection. 3.Hepatic steatosis. Findings discussed with Jose F Salinas DO at approximately 0650 hours on 31 March. Signed by Luis Zarate MD    CT shoulder left wo IV contrast    Result Date: 3/5/2025    Postsurgical changes of explantation of the left shoulder arthroplasty with antibiotic impregnated spacer.   Numerous areas of soft tissue gas throughout the left shoulder, the most prominent anteriorly adjacent to the pectoralis musculature. This looks to be gas within a sizable fluid collection. This could be either seroma with gas or potentially abscess. Correlate with any signs and symptoms of suspected soft tissue infection in the region.   New large area of left basilar consolidation suggestive of pneumonia.   MACRO: Critical Finding:  Report. Notification was initiated on 3/5/2025 at 1:07 pm by  Vahe Clark.  (**-YCF-**)   Signed by: Vahe Clark 3/5/2025 1:07 PM Dictation workstation:   [id number]    XR shoulder left 1 view    Result Date: 3/4/2025  Interval hardware explant with antibiotic spacer placement.   I personally reviewed the images/study and I agree with the findings as stated by Radiology resident.   MACRO: None   Signed by: Deborah Sol 3/4/2025 5:13 PM Dictation workstation:   [id number]        Assessment/Plan   Assessment & Plan  Single subsegmental pulmonary embolism without acute cor pulmonale    S/p recent Lt shoulder debridement abd antibiotic spacer    DVT ov Rt axillary vein    ? HCAP      PLAN: Patient remains on IV heparin drip, may change to oral anticoagulants in a.m., c/w IV ertapenem and vancomycin, monitor trough level, med list labs reviewed, discussed with nursing and wife in room, follow closely.                  Blaine Rubin MD

## 2025-04-02 LAB
ACID FAST STN SPEC: NORMAL
ANION GAP SERPL CALC-SCNC: 13 MMOL/L (ref 10–20)
BUN SERPL-MCNC: 17 MG/DL (ref 6–23)
CALCIUM SERPL-MCNC: 8.4 MG/DL (ref 8.6–10.3)
CHLORIDE SERPL-SCNC: 105 MMOL/L (ref 98–107)
CO2 SERPL-SCNC: 23 MMOL/L (ref 21–32)
CREAT SERPL-MCNC: 0.86 MG/DL (ref 0.5–1.3)
EGFRCR SERPLBLD CKD-EPI 2021: >90 ML/MIN/1.73M*2
ERYTHROCYTE [DISTWIDTH] IN BLOOD BY AUTOMATED COUNT: 13.7 % (ref 11.5–14.5)
GLUCOSE SERPL-MCNC: 96 MG/DL (ref 74–99)
HCT VFR BLD AUTO: 40.3 % (ref 41–52)
HGB BLD-MCNC: 13.3 G/DL (ref 13.5–17.5)
MCH RBC QN AUTO: 27.3 PG (ref 26–34)
MCHC RBC AUTO-ENTMCNC: 33 G/DL (ref 32–36)
MCV RBC AUTO: 83 FL (ref 80–100)
MYCOBACTERIUM SPEC CULT: NORMAL
NRBC BLD-RTO: 0 /100 WBCS (ref 0–0)
PLATELET # BLD AUTO: 153 X10*3/UL (ref 150–450)
POTASSIUM SERPL-SCNC: 3.9 MMOL/L (ref 3.5–5.3)
RBC # BLD AUTO: 4.88 X10*6/UL (ref 4.5–5.9)
SODIUM SERPL-SCNC: 137 MMOL/L (ref 136–145)
UFH PPP CHRO-ACNC: 0.5 IU/ML
WBC # BLD AUTO: 2.3 X10*3/UL (ref 4.4–11.3)

## 2025-04-02 PROCEDURE — 36415 COLL VENOUS BLD VENIPUNCTURE: CPT | Performed by: NURSE PRACTITIONER

## 2025-04-02 PROCEDURE — 2500000004 HC RX 250 GENERAL PHARMACY W/ HCPCS (ALT 636 FOR OP/ED): Performed by: INTERNAL MEDICINE

## 2025-04-02 PROCEDURE — 80048 BASIC METABOLIC PNL TOTAL CA: CPT | Performed by: NURSE PRACTITIONER

## 2025-04-02 PROCEDURE — 2060000001 HC INTERMEDIATE ICU ROOM DAILY

## 2025-04-02 PROCEDURE — 2500000001 HC RX 250 WO HCPCS SELF ADMINISTERED DRUGS (ALT 637 FOR MEDICARE OP): Performed by: INTERNAL MEDICINE

## 2025-04-02 PROCEDURE — 85520 HEPARIN ASSAY: CPT | Performed by: INTERNAL MEDICINE

## 2025-04-02 PROCEDURE — 85027 COMPLETE CBC AUTOMATED: CPT | Performed by: NURSE PRACTITIONER

## 2025-04-02 PROCEDURE — 2500000001 HC RX 250 WO HCPCS SELF ADMINISTERED DRUGS (ALT 637 FOR MEDICARE OP): Performed by: NURSE PRACTITIONER

## 2025-04-02 RX ORDER — CIPROFLOXACIN 500 MG/1
500 TABLET ORAL EVERY 12 HOURS SCHEDULED
Status: DISCONTINUED | OUTPATIENT
Start: 2025-04-02 | End: 2025-04-03 | Stop reason: HOSPADM

## 2025-04-02 RX ORDER — CLINDAMYCIN PHOSPHATE 900 MG/50ML
900 INJECTION, SOLUTION INTRAVENOUS EVERY 8 HOURS
Status: DISCONTINUED | OUTPATIENT
Start: 2025-04-02 | End: 2025-04-03 | Stop reason: HOSPADM

## 2025-04-02 RX ADMIN — CLINDAMYCIN PHOSPHATE 900 MG: 900 INJECTION, SOLUTION INTRAVENOUS at 21:46

## 2025-04-02 RX ADMIN — CIPROFLOXACIN 500 MG: 500 TABLET ORAL at 20:36

## 2025-04-02 RX ADMIN — APIXABAN 10 MG: 5 TABLET, FILM COATED ORAL at 08:45

## 2025-04-02 RX ADMIN — APIXABAN 10 MG: 5 TABLET, FILM COATED ORAL at 20:36

## 2025-04-02 ASSESSMENT — PAIN SCALES - GENERAL
PAINLEVEL_OUTOF10: 0 - NO PAIN

## 2025-04-02 ASSESSMENT — COGNITIVE AND FUNCTIONAL STATUS - GENERAL
DAILY ACTIVITIY SCORE: 21
MOBILITY SCORE: 24
MOBILITY SCORE: 24
HELP NEEDED FOR BATHING: A LITTLE
DRESSING REGULAR LOWER BODY CLOTHING: A LITTLE
DRESSING REGULAR UPPER BODY CLOTHING: A LITTLE
DRESSING REGULAR LOWER BODY CLOTHING: A LITTLE
HELP NEEDED FOR BATHING: A LITTLE
DRESSING REGULAR UPPER BODY CLOTHING: A LITTLE
DAILY ACTIVITIY SCORE: 21

## 2025-04-02 ASSESSMENT — PAIN - FUNCTIONAL ASSESSMENT
PAIN_FUNCTIONAL_ASSESSMENT: 0-10

## 2025-04-02 NOTE — ASSESSMENT & PLAN NOTE
S/p recent Lt shoulder debridement abd antibiotic spacer    DVT ov Rt axillary vein    ? HCAP      PLAN: Patient's IV heparin was switched to oral Eliquis, his vancomycin was DC'd per ID, currently on IV ertapenem, med list and labs reviewed, discussed with nursing, follow closely patient may need a PICC line if he goes with IV antibiotic, awaiting input from ID.

## 2025-04-02 NOTE — CARE PLAN
Pt safety maintained during shift. Provider updates given to patient.    Problem: Pain - Adult  Goal: Verbalizes/displays adequate comfort level or baseline comfort level  Outcome: Progressing     Problem: Safety - Adult  Goal: Free from fall injury  Outcome: Progressing     Problem: Discharge Planning  Goal: Discharge to home or other facility with appropriate resources  Outcome: Progressing     Problem: Chronic Conditions and Co-morbidities  Goal: Patient's chronic conditions and co-morbidity symptoms are monitored and maintained or improved  Outcome: Progressing     Problem: Nutrition  Goal: Nutrient intake appropriate for maintaining nutritional needs  Outcome: Progressing     Problem: Infection related to problem list condition  Goal: Infection will resolve through treatment  Outcome: Progressing

## 2025-04-02 NOTE — CARE PLAN
Problem: Infection related to problem list condition  Goal: Infection will resolve through treatment  Outcome: Progressing     Problem: Safety - Adult  Goal: Free from fall injury  Outcome: Progressing     Problem: Chronic Conditions and Co-morbidities  Goal: Patient's chronic conditions and co-morbidity symptoms are monitored and maintained or improved  Outcome: Progressing     Problem: Pain - Adult  Goal: Verbalizes/displays adequate comfort level or baseline comfort level  Outcome: Progressing     The patient's goals for the shift include  to not have any new signs of infection.    The clinical goals for the shift include the patient will have stable vitals and a temperature below 37.5 by 0700 on 4/2/25.

## 2025-04-02 NOTE — PROGRESS NOTES
For follow-up of:  Left prosthetic shoulder infection    Subjective   Interval History:  He has been having fevers today.  He had his PICC line removed this morning       Objective     Current Facility-Administered Medications   Medication Dose Route Frequency Provider Last Rate Last Admin    acetaminophen (Tylenol) tablet 650 mg  650 mg oral q6h PRN Michelle Jerardo, APRN-CNP   650 mg at 04/01/25 1812    [START ON 4/2/2025] apixaban (Eliquis) tablet 10 mg  10 mg oral BID LESLEE Rubi        Followed by    [START ON 4/9/2025] apixaban (Eliquis) tablet 5 mg  5 mg oral BID LESLEE Rubi        docusate sodium (Colace) capsule 100 mg  100 mg oral BID LESLEE Maldonado        ertapenem (INVanz) 1 g in sodium chloride 0.9% IV 50 mL  1 g intravenous q24h KAREN Maldonado-CNP 0 mL/hr at 03/31/25 2158 1 g at 04/01/25 2057    heparin 25,000 Units in dextrose 5% 250 mL (100 Units/mL) infusion (premix)  0-4,500 Units/hr intravenous Continuous LESLEE Rubi 14 mL/hr at 04/01/25 1703 1,400 Units/hr at 04/01/25 1703    polyethylene glycol (Glycolax, Miralax) packet 17 g  17 g oral Daily KAREN Maldonado-CNP        sodium chloride 0.9 % bolus 1,000 mL  1,000 mL intravenous Once LESLEE Montalvo 500 mL/hr at 04/01/25 2025 1,000 mL at 04/01/25 2025    vancomycin (Vancocin) pharmacy to dose - pharmacy monitoring   miscellaneous Daily PRN LESLEE Maldonado        vancomycin 1,250 mg in D5W  mL  1,250 mg intravenous q12h Angella Watkins APRN- mL/hr at 04/01/25 1310 Restarted at 04/01/25 1310        Last Recorded Vitals  /85 (BP Location: Left arm, Patient Position: Sitting)   Pulse 86   Temp 37.5 °C (99.5 °F) (Temporal)   Resp 20   Wt 93.5 kg (206 lb 2.1 oz)   SpO2 95%   General: no acute distress, sitting on the edge of the bed, interactive  HEENT: pharynx not examined  CVS: RRR  Resp: decreased breath sounds in  bases  ABD: soft, NT, ND  EXT: no edema  Skin: no rash     Relevant Results:    Labs:   Results from last 72 hours   Lab Units 04/01/25  0306 03/31/25  0517   SODIUM mmol/L 136 134*   POTASSIUM mmol/L 3.7 3.8   CHLORIDE mmol/L 104 102   BUN mg/dL 18 15   CREATININE mg/dL 0.91 0.78     Results from last 72 hours   Lab Units 04/01/25  0306 03/31/25  0517   WBC AUTO x10*3/uL 2.9* 4.0*   HEMOGLOBIN g/dL 13.3* 14.8   HEMATOCRIT % 39.1* 43.3   PLATELETS AUTO x10*3/uL 164 201       Microbiology data: I have personally and independently reviewed and intrepreted the lab results  3/4/2025 surgical cultures show cutibacterium acnes  3/30/2025 influenza swab negative; COVID-19 test negative; RSV swab negative  3/30/2025 blood culture show no growth    Imaging data: I have personally and independently reviewed and interpreted the imaging studies  3/31/2025 CT angiogram chest shows a PE in the right lower lobe  3/31/2025 upper extremity Doppler ultrasound shows DVT in axillary vein of the left upper extremity and a DVT in the internal jugular vein    Assessment/Plan     MY IMPRESSION & RECOMMENDATIONS:  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  He is being managed for a right upper extremity DVT and right lower lobe PE.  He had his PICC line removed.  He is still having fevers.  He is receiving anticoagulation.  His white blood cell count is a bit lower today, so we will need to monitor him closely to determine if he is developing a leukopenia.  There is a chance that he is experiencing an adverse reaction to the antibiotics.      -Continue ertapenem for now  -Will stop the vancomycin since his blood cultures show no growth  -CBC tomorrow  -Monitor temperatures     Other issues:  #RUE DVT and RLL PE, being followed by vascular surgery  #Asthma  #Hyperlipidemia  #History of PE after ACL repair in 2005 status post IVC filter  #Osteoarthritis  status post left shoulder replacement in 2015 complicated by multiple recent infections     ~I have personally and independently reviewed and interpreted the laboratory tests, imaging studies, and the documentation from other healthcare providers.  I am monitoring for side effects and toxicity from ertapenem as outlined in a previous note.  His prognosis remains uncertain due to the recurrent problems that he has had with his left shoulder.         Shaji Bustamante MD

## 2025-04-02 NOTE — PROGRESS NOTES
Vancomycin Dosing by Pharmacy- Cessation of Therapy    Consult to pharmacy for vancomycin dosing has been discontinued by the prescriber, pharmacy will sign off at this time.    Please call pharmacy if there are further questions or re-enter a consult if vancomycin is resumed.     JYOTI STONE

## 2025-04-03 ENCOUNTER — PHARMACY VISIT (OUTPATIENT)
Dept: PHARMACY | Facility: CLINIC | Age: 45
End: 2025-04-03
Payer: COMMERCIAL

## 2025-04-03 ENCOUNTER — APPOINTMENT (OUTPATIENT)
Dept: PHYSICAL THERAPY | Facility: CLINIC | Age: 45
End: 2025-04-03
Payer: COMMERCIAL

## 2025-04-03 VITALS
TEMPERATURE: 97.3 F | WEIGHT: 206.13 LBS | SYSTOLIC BLOOD PRESSURE: 123 MMHG | DIASTOLIC BLOOD PRESSURE: 79 MMHG | HEART RATE: 72 BPM | OXYGEN SATURATION: 97 % | BODY MASS INDEX: 34.34 KG/M2 | RESPIRATION RATE: 18 BRPM | HEIGHT: 65 IN

## 2025-04-03 LAB
ANION GAP SERPL CALC-SCNC: 13 MMOL/L (ref 10–20)
BASOPHILS # BLD MANUAL: 0.06 X10*3/UL (ref 0–0.1)
BASOPHILS NFR BLD MANUAL: 2 %
BUN SERPL-MCNC: 18 MG/DL (ref 6–23)
CALCIUM SERPL-MCNC: 8.8 MG/DL (ref 8.6–10.3)
CHLORIDE SERPL-SCNC: 103 MMOL/L (ref 98–107)
CO2 SERPL-SCNC: 24 MMOL/L (ref 21–32)
CREAT SERPL-MCNC: 0.93 MG/DL (ref 0.5–1.3)
EGFRCR SERPLBLD CKD-EPI 2021: >90 ML/MIN/1.73M*2
EOSINOPHIL # BLD MANUAL: 0.28 X10*3/UL (ref 0–0.7)
EOSINOPHIL NFR BLD MANUAL: 9 %
ERYTHROCYTE [DISTWIDTH] IN BLOOD BY AUTOMATED COUNT: 13.6 % (ref 11.5–14.5)
GLUCOSE SERPL-MCNC: 83 MG/DL (ref 74–99)
HCT VFR BLD AUTO: 42.4 % (ref 41–52)
HGB BLD-MCNC: 14.1 G/DL (ref 13.5–17.5)
IMM GRANULOCYTES # BLD AUTO: 0.06 X10*3/UL (ref 0–0.7)
IMM GRANULOCYTES NFR BLD AUTO: 1.9 % (ref 0–0.9)
LYMPHOCYTES # BLD MANUAL: 1.46 X10*3/UL (ref 1.2–4.8)
LYMPHOCYTES NFR BLD MANUAL: 47 %
MCH RBC QN AUTO: 27.3 PG (ref 26–34)
MCHC RBC AUTO-ENTMCNC: 33.3 G/DL (ref 32–36)
MCV RBC AUTO: 82 FL (ref 80–100)
MONOCYTES # BLD MANUAL: 0.22 X10*3/UL (ref 0.1–1)
MONOCYTES NFR BLD MANUAL: 7 %
NEUTROPHILS # BLD MANUAL: 0.9 X10*3/UL (ref 1.2–7.7)
NEUTS BAND # BLD MANUAL: 0.06 X10*3/UL (ref 0–0.7)
NEUTS BAND NFR BLD MANUAL: 2 %
NEUTS SEG # BLD MANUAL: 0.84 X10*3/UL (ref 1.2–7)
NEUTS SEG NFR BLD MANUAL: 27 %
NRBC BLD-RTO: 0 /100 WBCS (ref 0–0)
PLATELET # BLD AUTO: 206 X10*3/UL (ref 150–450)
POTASSIUM SERPL-SCNC: 4.1 MMOL/L (ref 3.5–5.3)
RBC # BLD AUTO: 5.16 X10*6/UL (ref 4.5–5.9)
RBC MORPH BLD: ABNORMAL
SODIUM SERPL-SCNC: 136 MMOL/L (ref 136–145)
TOTAL CELLS COUNTED BLD: 100
UFH PPP CHRO-ACNC: 2 IU/ML
VARIANT LYMPHS # BLD MANUAL: 0.19 X10*3/UL (ref 0–0.5)
VARIANT LYMPHS NFR BLD: 6 %
WBC # BLD AUTO: 3.1 X10*3/UL (ref 4.4–11.3)

## 2025-04-03 PROCEDURE — RXMED WILLOW AMBULATORY MEDICATION CHARGE

## 2025-04-03 PROCEDURE — 36415 COLL VENOUS BLD VENIPUNCTURE: CPT | Performed by: INTERNAL MEDICINE

## 2025-04-03 PROCEDURE — 85007 BL SMEAR W/DIFF WBC COUNT: CPT | Performed by: INTERNAL MEDICINE

## 2025-04-03 PROCEDURE — 80048 BASIC METABOLIC PNL TOTAL CA: CPT | Performed by: NURSE PRACTITIONER

## 2025-04-03 PROCEDURE — 2500000004 HC RX 250 GENERAL PHARMACY W/ HCPCS (ALT 636 FOR OP/ED): Performed by: INTERNAL MEDICINE

## 2025-04-03 PROCEDURE — 2500000001 HC RX 250 WO HCPCS SELF ADMINISTERED DRUGS (ALT 637 FOR MEDICARE OP): Performed by: NURSE PRACTITIONER

## 2025-04-03 PROCEDURE — 2500000001 HC RX 250 WO HCPCS SELF ADMINISTERED DRUGS (ALT 637 FOR MEDICARE OP): Performed by: INTERNAL MEDICINE

## 2025-04-03 PROCEDURE — 85520 HEPARIN ASSAY: CPT | Performed by: INTERNAL MEDICINE

## 2025-04-03 PROCEDURE — 85027 COMPLETE CBC AUTOMATED: CPT | Performed by: INTERNAL MEDICINE

## 2025-04-03 RX ORDER — CLINDAMYCIN HYDROCHLORIDE 300 MG/1
300 CAPSULE ORAL EVERY 6 HOURS
Qty: 56 CAPSULE | Refills: 0 | Status: SHIPPED | OUTPATIENT
Start: 2025-04-03 | End: 2025-04-17

## 2025-04-03 RX ORDER — ACETAMINOPHEN 325 MG/1
650 TABLET ORAL EVERY 6 HOURS PRN
Start: 2025-04-03

## 2025-04-03 RX ORDER — CIPROFLOXACIN 500 MG/1
500 TABLET ORAL 2 TIMES DAILY
Qty: 28 TABLET | Refills: 0 | Status: SHIPPED | OUTPATIENT
Start: 2025-04-03 | End: 2025-04-17

## 2025-04-03 RX ADMIN — APIXABAN 10 MG: 5 TABLET, FILM COATED ORAL at 08:49

## 2025-04-03 RX ADMIN — CLINDAMYCIN PHOSPHATE 900 MG: 900 INJECTION, SOLUTION INTRAVENOUS at 06:16

## 2025-04-03 RX ADMIN — CLINDAMYCIN PHOSPHATE 900 MG: 900 INJECTION, SOLUTION INTRAVENOUS at 14:35

## 2025-04-03 RX ADMIN — CIPROFLOXACIN 500 MG: 500 TABLET ORAL at 08:49

## 2025-04-03 ASSESSMENT — COGNITIVE AND FUNCTIONAL STATUS - GENERAL
DRESSING REGULAR LOWER BODY CLOTHING: A LITTLE
MOBILITY SCORE: 24
DRESSING REGULAR UPPER BODY CLOTHING: A LITTLE
HELP NEEDED FOR BATHING: A LITTLE
DAILY ACTIVITIY SCORE: 21

## 2025-04-03 ASSESSMENT — PAIN SCALES - GENERAL
PAINLEVEL_OUTOF10: 0 - NO PAIN

## 2025-04-03 ASSESSMENT — PAIN - FUNCTIONAL ASSESSMENT
PAIN_FUNCTIONAL_ASSESSMENT: 0-10

## 2025-04-03 NOTE — CARE PLAN
The patient's goals for the shift include        Problem: Pain - Adult  Goal: Verbalizes/displays adequate comfort level or baseline comfort level  Outcome: Progressing     Problem: Discharge Planning  Goal: Discharge to home or other facility with appropriate resources  Outcome: Progressing     Problem: Chronic Conditions and Co-morbidities  Goal: Patient's chronic conditions and co-morbidity symptoms are monitored and maintained or improved  Outcome: Progressing     Problem: Nutrition  Goal: Nutrient intake appropriate for maintaining nutritional needs  Outcome: Progressing     Pt independent in room, denies SOB or pain, on RA and o2sa wnl. Hoping for discharge order.

## 2025-04-03 NOTE — NURSING NOTE
Discharge instruction and education given and reviewed, meds to beds received, assisted to vehicle for discharge home with spouse.

## 2025-04-03 NOTE — PROGRESS NOTES
"Ernesto Thurston is a 45 y.o. male on day 3 of admission presenting with Single subsegmental pulmonary embolism without acute cor pulmonale.    Subjective   ***       Objective         Current Facility-Administered Medications:     acetaminophen (Tylenol) tablet 650 mg, 650 mg, oral, q6h PRN, LESLEE Fried, 650 mg at 04/01/25 1812    apixaban (Eliquis) tablet 10 mg, 10 mg, oral, BID, 10 mg at 04/03/25 0849 **FOLLOWED BY** [START ON 4/9/2025] apixaban (Eliquis) tablet 5 mg, 5 mg, oral, BID, LESLEE Rubi    ciprofloxacin (Cipro) tablet 500 mg, 500 mg, oral, q12h MISTY, Shaji Bustamante MD, 500 mg at 04/03/25 0849    clindamycin (Cleocin) 900 mg in dextrose 5% IV 50 mL, 900 mg, intravenous, q8h, Shaji Bustamante MD, Stopped at 04/03/25 0736    docusate sodium (Colace) capsule 100 mg, 100 mg, oral, BID, LESLEE Maldonado    polyethylene glycol (Glycolax, Miralax) packet 17 g, 17 g, oral, Daily, LESLEE Maldonado       Physical Exam      Last Recorded Vitals  Blood pressure 124/74, pulse 80, temperature 36.2 °C (97.2 °F), temperature source Temporal, resp. rate 18, height 1.651 m (5' 5\"), weight 93.5 kg (206 lb 2.1 oz), SpO2 96%.  Intake/Output last 3 Shifts:  I/O last 3 completed shifts:  In: 2097.7 (22.4 mL/kg) [P.O.:830; I.V.:217.7 (2.3 mL/kg); IV Piggyback:1050]  Out: - (0 mL/kg)   Weight: 93.5 kg           Labs:       Results for orders placed or performed during the hospital encounter of 03/31/25 (from the past 24 hours)   Basic Metabolic Panel   Result Value Ref Range    Glucose 83 74 - 99 mg/dL    Sodium 136 136 - 145 mmol/L    Potassium 4.1 3.5 - 5.3 mmol/L    Chloride 103 98 - 107 mmol/L    Bicarbonate 24 21 - 32 mmol/L    Anion Gap 13 10 - 20 mmol/L    Urea Nitrogen 18 6 - 23 mg/dL    Creatinine 0.93 0.50 - 1.30 mg/dL    eGFR >90 >60 mL/min/1.73m*2    Calcium 8.8 8.6 - 10.3 mg/dL   Heparin Assay   Result Value Ref Range    Heparin Unfractionated 2.0 () See Comment " Below for Therapeutic Ranges IU/mL   CBC and Auto Differential   Result Value Ref Range    WBC 3.1 (L) 4.4 - 11.3 x10*3/uL    nRBC 0.0 0.0 - 0.0 /100 WBCs    RBC 5.16 4.50 - 5.90 x10*6/uL    Hemoglobin 14.1 13.5 - 17.5 g/dL    Hematocrit 42.4 41.0 - 52.0 %    MCV 82 80 - 100 fL    MCH 27.3 26.0 - 34.0 pg    MCHC 33.3 32.0 - 36.0 g/dL    RDW 13.6 11.5 - 14.5 %    Platelets 206 150 - 450 x10*3/uL    Immature Granulocytes %, Automated 1.9 (H) 0.0 - 0.9 %    Immature Granulocytes Absolute, Automated 0.06 0.00 - 0.70 x10*3/uL   Manual Differential   Result Value Ref Range    Neutrophils %, Manual 27.0 40.0 - 80.0 %    Bands %, Manual 2.0 0.0 - 5.0 %    Lymphocytes %, Manual 47.0 13.0 - 44.0 %    Monocytes %, Manual 7.0 2.0 - 10.0 %    Eosinophils %, Manual 9.0 0.0 - 6.0 %    Basophils %, Manual 2.0 0.0 - 2.0 %    Atypical Lymphocytes %, Manual 6.0 0.0 - 2.0 %    Seg Neutrophils Absolute, Manual 0.84 (L) 1.20 - 7.00 x10*3/uL    Bands Absolute, Manual 0.06 0.00 - 0.70 x10*3/uL    Lymphocytes Absolute, Manual 1.46 1.20 - 4.80 x10*3/uL    Monocytes Absolute, Manual 0.22 0.10 - 1.00 x10*3/uL    Eosinophils Absolute, Manual 0.28 0.00 - 0.70 x10*3/uL    Basophils Absolute, Manual 0.06 0.00 - 0.10 x10*3/uL    Atypical Lymphs Absolute, Manual 0.19 0.00 - 0.50 x10*3/uL    Total Cells Counted 100     Neutrophils Absolute, Manual 0.90 (L) 1.20 - 7.70 x10*3/uL    RBC Morphology No significant RBC morphology present         Radiology  CT angio chest for pulmonary embolism    Result Date: 3/31/2025  1.Nonocclusive pulmonary emboli within multiple segmental arteries to the right lower lobe. No evidence of right heart strain. 2.Left axillary lymphadenopathy. Correlate for possible left upper extremity infection. 3.Hepatic steatosis. Findings discussed with Jose F Salinas DO at approximately 0650 hours on 31 March. Signed by Luis Zarate MD    CT shoulder left wo IV contrast    Result Date: 3/5/2025    Postsurgical changes of explantation  of the left shoulder arthroplasty with antibiotic impregnated spacer.   Numerous areas of soft tissue gas throughout the left shoulder, the most prominent anteriorly adjacent to the pectoralis musculature. This looks to be gas within a sizable fluid collection. This could be either seroma with gas or potentially abscess. Correlate with any signs and symptoms of suspected soft tissue infection in the region.   New large area of left basilar consolidation suggestive of pneumonia.   MACRO: Critical Finding:  Report. Notification was initiated on 3/5/2025 at 1:07 pm by  Vahe Clark.  (**-YCF-**)   Signed by: Vahe Clark 3/5/2025 1:07 PM Dictation workstation:   [id number]    XR shoulder left 1 view    Result Date: 3/4/2025  Interval hardware explant with antibiotic spacer placement.   I personally reviewed the images/study and I agree with the findings as stated by Radiology resident.   MACRO: None   Signed by: Deborah Sol 3/4/2025 5:13 PM Dictation workstation:   [id number]        Assessment/Plan   Assessment & Plan  Single subsegmental pulmonary embolism without acute cor pulmonale    S/p recent Lt shoulder debridement abd antibiotic spacer    DVT ov Rt axillary vein    ? HCAP      PLAN: Patient's IV heparin was switched to oral Eliquis, his vancomycin was DC'd per ID, currently on IV ertapenem, med list and labs reviewed, discussed with nursing, follow closely patient may need a PICC line if he goes with IV antibiotic, awaiting input from ID.       ***     {This patient does not have an ACP note on file for this encounter, please fill one out - Advance Care Planning Activity :99}  I spent *** minutes in the professional and overall care of this patient.      Blaine Rubin MD

## 2025-04-03 NOTE — PROGRESS NOTES
Received a call from [facility], a [facility] that was doing home infusions on patient prior to coming to hospital. They were requesting an update on patient and also wanting to know if patient would be discharging home on IV meds again. [facility], a referral to [facility] to keep them updated on patient's status and discharge. CT team to follow.

## 2025-04-03 NOTE — DISCHARGE INSTRUCTIONS
WILL NEED TO CONTACT PCP OR VASCULAR DR WEST FOR REFILLS ON ELIQUIS (BLOOD THINNER MEDICATION, GENERIC NAME IS APIXABAN)

## 2025-04-03 NOTE — CARE PLAN
Problem: Infection related to problem list condition  Goal: Infection will resolve through treatment  Outcome: Progressing     Problem: Safety - Adult  Goal: Free from fall injury  Outcome: Progressing     Problem: Chronic Conditions and Co-morbidities  Goal: Patient's chronic conditions and co-morbidity symptoms are monitored and maintained or improved  Outcome: Progressing     The patient's goals for the shift include  to start his new antibiotics and to have his white blood cell count improve.    The clinical goals for the shift include the patient will not have any fever or side effects related to his new antibiotics.

## 2025-04-03 NOTE — PROGRESS NOTES
For follow-up of:  Left prosthetic shoulder infection    Subjective   Interval History:  He says he feels much better today.  He has had no fevers.       Objective     Current Facility-Administered Medications   Medication Dose Route Frequency Provider Last Rate Last Admin    acetaminophen (Tylenol) tablet 650 mg  650 mg oral q6h PRN Michelle LESLEE Kurtz   650 mg at 04/01/25 1812    apixaban (Eliquis) tablet 10 mg  10 mg oral BID LESLEE Rubi   10 mg at 04/02/25 2036    Followed by    [START ON 4/9/2025] apixaban (Eliquis) tablet 5 mg  5 mg oral BID LESLEE Rubi        ciprofloxacin (Cipro) tablet 500 mg  500 mg oral q12h [facility] Shaji Bustamante MD   500 mg at 04/02/25 2036    clindamycin (Cleocin) 900 mg in dextrose 5% IV 50 mL  900 mg intravenous q8h Shaji Bustamante MD        docusate sodium (Colace) capsule 100 mg  100 mg oral BID LESLEE Maldonado        polyethylene glycol (Glycolax, Miralax) packet 17 g  17 g oral Daily LESLEE Maldonado            Last Recorded Vitals  /78 (BP Location: Left arm, Patient Position: Sitting)   Pulse 76   Temp 36.5 °C (97.7 °F) (Temporal)   Resp 16   Wt 93.5 kg (206 lb 2.1 oz)   SpO2 98%   General: no acute distress, sitting in recliner, interactive  HEENT: pharynx not examined  CVS: RRR  Resp: decreased breath sounds in bases  ABD: soft, NT, ND  EXT: no edema  Skin: no rash     Relevant Results:    Labs:   Results from last 72 hours   Lab Units 04/02/25  0427 04/01/25  0306 03/31/25  0517   SODIUM mmol/L 137 136 134*   POTASSIUM mmol/L 3.9 3.7 3.8   CHLORIDE mmol/L 105 104 102   BUN mg/dL 17 18 15   CREATININE mg/dL 0.86 0.91 0.78     Results from last 72 hours   Lab Units 04/02/25  0427 04/01/25  0306 03/31/25  0517   WBC AUTO x10*3/uL 2.3* 2.9* 4.0*   HEMOGLOBIN g/dL 13.3* 13.3* 14.8   HEMATOCRIT % 40.3* 39.1* 43.3   PLATELETS AUTO x10*3/uL 153 164 201       Microbiology data: I have personally and  independently reviewed and intrepreted the lab results  3/4/2025 surgical cultures show cutibacterium acnes  3/30/2025 influenza swab negative; COVID-19 test negative; RSV swab negative  3/30/2025 blood culture show no growth    Imaging data: I have personally and independently reviewed and interpreted the imaging studies  3/31/2025 CT angiogram chest shows a PE in the right lower lobe  3/31/2025 upper extremity Doppler ultrasound shows DVT in axillary vein of the left upper extremity and a DVT in the internal jugular vein    Assessment/Plan     MY IMPRESSION & RECOMMENDATIONS:  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  He continues to be managed for a right upper extremity DVT and right lower lobe PE.  He seems to have defervesced.  I have discussed his medical care with Dr. Rubin.  For some reason, his white blood cell count is decreasing and he is becoming leukopenic.  I will change his antibiotics to see if this helps.       -Change ertapenem to IV clindamycin and oral Cipro  -CBC tomorrow     Other issues:  #RUE DVT and RLL PE, being followed by vascular surgery  #Asthma  #Hyperlipidemia  #History of PE after ACL repair in 2005 status post IVC filter  #Osteoarthritis status post left shoulder replacement in 2015 complicated by multiple recent infections     ~I have personally and independently reviewed and interpreted the laboratory tests, imaging studies, and the documentation from other healthcare providers.  I am monitoring for side effects and toxicity from Cipro and clindamycin which can include rash, diarrhea, hypoglycemia, tendon inflammation, and aneurysm formation.  His prognosis is still uncertain due to the recurrent problems that he has had with his left shoulder.       Shaji Bustamante MD

## 2025-04-04 ENCOUNTER — PATIENT OUTREACH (OUTPATIENT)
Dept: CARDIOLOGY | Facility: CLINIC | Age: 45
End: 2025-04-04
Payer: COMMERCIAL

## 2025-04-04 DIAGNOSIS — T84.59XA INFECTION ASSOCIATED WITH PROSTHESIS OF LEFT SHOULDER JOINT: ICD-10-CM

## 2025-04-04 DIAGNOSIS — Z96.612 INFECTION ASSOCIATED WITH PROSTHESIS OF LEFT SHOULDER JOINT: ICD-10-CM

## 2025-04-04 LAB
BACTERIA BLD CULT: NORMAL
BACTERIA BLD CULT: NORMAL

## 2025-04-04 NOTE — PROGRESS NOTES
Discharge Facility:  [facility]   Discharge Diagnosis:  Single subsegmental pulmonary embolism without acute cor pulmonale     Admission Date:  3/31/25  Discharge Date:   4/3/25    PCP Appointment Date:  TBD  Specialist Appointment Date:   Juanito Crain MD-Orthopaedic Surgery-[facility]    4/15/2025  9:15 AM Giuseppe Knapp MD Infectious Diseases  Appointment with Ashanti Foreman MD (04/29/2025)   Hospital Encounter and Summary Linked: Yes  ED to Hosp-Admission (Discharged) with Blaine Rubin MD (03/31/2025)   See discharge assessment below for further details  Wrap Up  Wrap Up Additional Comments: Successful transition of care outreach within 2 business days of discharge. CM introduced myself and the TCM program.   CM gave my contact information and encouraged to call if needing assistance or has any further non-emergent questions prior to my next outreach.   Reviewed hospital stay and answered any questions. PICC has been removed and discharge home on oral antibiotics. Patient denies any further discharge questions/needs at this time. (4/4/2025  9:28 AM)    Medications  Medications reviewed with patient/caregiver?: Yes (4/4/2025  9:28 AM)  Is the patient having any side effects they believe may be caused by any medication additions or changes?: No (4/4/2025  9:28 AM)  Does the patient have all medications ordered at discharge?: Yes (4/4/2025  9:28 AM)  Prescription Comments: START taking: ciprofloxacin (Cipro) clindamycin (Cleocin) Eliquis (apixaban) Start taking on: April 3, 2025   STOP taking: chlorhexidine 0.12 % solution (Peridex) ertapenem IV (INVanz) HYDROcodone-acetaminophen 5-325 mg tablet (Norco) vancomycin 1 g in dextrose 5% 250 mL IVPB (4/4/2025  9:28 AM)  Is the patient taking all medications as directed (includes completed medication regime)?: Yes (4/4/2025  9:28 AM)  Medication Comments: CM discussed  referencing discharge paperwork to follow detailed daily medication schedule. Patient endorses understanding & compliance with medications. (4/4/2025  9:28 AM)    Appointments  Does the patient have a primary care provider?: Yes (4/4/2025  9:28 AM)  Has the patient kept scheduled appointments due by today?: Yes (4/4/2025  9:28 AM)  Care Management Interventions: Advised to schedule with specialist; Educated on importance of keeping appointment (4/4/2025  9:28 AM)    Self Management  Has home health visited the patient within 72 hours of discharge?: Not applicable (4/4/2025  9:28 AM)  What Durable Medical Equipment (DME) was ordered?: na (4/4/2025  9:28 AM)    Patient Teaching  Does the patient have access to their discharge instructions?: Yes (4/4/2025  9:28 AM)  Care Management Interventions: Reviewed instructions with patient; Educated on MyChart (Active MyChart user) (4/4/2025  9:28 AM)  What is the patient's perception of their health status since discharge?: Improving (4/4/2025  9:28 AM)  Is the patient/caregiver able to teach back the hierarchy of who to call/visit for symptoms/problems? PCP, Specialist, Home Health nurse, Urgent Care, ED, 911: Yes (4/4/2025  9:28 AM)

## 2025-04-04 NOTE — PROGRESS NOTES
Spiritual Care Visit  Spiritual Care Request    Reason for Visit:  Routine Visit: Introduction  Continue Visiting: Yes     Request Received From:       Focus of Care:  Visited With: Patient and family together         Refer to :          Spiritual Care Assessment    Spiritual Assessment:                      Care Provided:  Intended Effects: Build relationship of care and support, Convey a calming presence, Demonstrate caring and concern, Lessen someone's feelings of isolation, Helping someone feel comforted, Promote sense of peace    Sense of Community and or Roman Catholic Affiliation:  None         Addressed Needs/Concerns and/or Stefani Through:          Outcome:        Advance Directives:         Spiritual Care Annotation    Annotation:  Nice brief visit with patient and his wife - we discussed their three boys, and one of them is entering the  service soon.

## 2025-04-07 ENCOUNTER — APPOINTMENT (OUTPATIENT)
Dept: CARDIOLOGY | Facility: CLINIC | Age: 45
End: 2025-04-07
Payer: COMMERCIAL

## 2025-04-07 ENCOUNTER — TREATMENT (OUTPATIENT)
Dept: PHYSICAL THERAPY | Facility: CLINIC | Age: 45
End: 2025-04-07
Payer: COMMERCIAL

## 2025-04-07 VITALS
HEART RATE: 60 BPM | SYSTOLIC BLOOD PRESSURE: 114 MMHG | HEIGHT: 66 IN | WEIGHT: 203 LBS | BODY MASS INDEX: 32.62 KG/M2 | DIASTOLIC BLOOD PRESSURE: 70 MMHG

## 2025-04-07 DIAGNOSIS — I82.C29 CHRONIC DEEP VEIN THROMBOSIS (DVT) OF INTERNAL JUGULAR VEIN (MULTI): ICD-10-CM

## 2025-04-07 DIAGNOSIS — E78.2 MIXED HYPERLIPIDEMIA: ICD-10-CM

## 2025-04-07 DIAGNOSIS — Z96.619 INFECTION OF PROSTHETIC SHOULDER JOINT, SUBSEQUENT ENCOUNTER: Primary | ICD-10-CM

## 2025-04-07 DIAGNOSIS — I82.A11 ACUTE DEEP VEIN THROMBOSIS (DVT) OF AXILLARY VEIN OF RIGHT UPPER EXTREMITY: ICD-10-CM

## 2025-04-07 DIAGNOSIS — Z78.9 NEVER SMOKED TOBACCO: ICD-10-CM

## 2025-04-07 DIAGNOSIS — I26.93 SINGLE SUBSEGMENTAL PULMONARY EMBOLISM WITHOUT ACUTE COR PULMONALE: ICD-10-CM

## 2025-04-07 DIAGNOSIS — T84.59XD INFECTION OF PROSTHETIC SHOULDER JOINT, SUBSEQUENT ENCOUNTER: Primary | ICD-10-CM

## 2025-04-07 PROBLEM — E66.9 OBESITY: Status: RESOLVED | Noted: 2025-03-04 | Resolved: 2025-04-07

## 2025-04-07 PROCEDURE — 97140 MANUAL THERAPY 1/> REGIONS: CPT | Mod: GP

## 2025-04-07 PROCEDURE — 3008F BODY MASS INDEX DOCD: CPT | Performed by: STUDENT IN AN ORGANIZED HEALTH CARE EDUCATION/TRAINING PROGRAM

## 2025-04-07 PROCEDURE — 97110 THERAPEUTIC EXERCISES: CPT | Mod: GP

## 2025-04-07 PROCEDURE — 99214 OFFICE O/P EST MOD 30 MIN: CPT | Performed by: STUDENT IN AN ORGANIZED HEALTH CARE EDUCATION/TRAINING PROGRAM

## 2025-04-07 ASSESSMENT — PAIN - FUNCTIONAL ASSESSMENT: PAIN_FUNCTIONAL_ASSESSMENT: 0-10

## 2025-04-07 ASSESSMENT — PAIN SCALES - GENERAL: PAINLEVEL_OUTOF10: 2

## 2025-04-07 NOTE — PROGRESS NOTES
Chief complaint:   Chief Complaint   Patient presents with    Hospital Follow-up     1 week TCM from [facility] for pulmonary embolism.  He was discharged 4/3/2025        History of Present Illness  Ernesto Thurston is a 45 y.o. year old male patient with history of pulmonary embolism after ACL repair surgery status post IVC filter in 2005 (still in place) and recent hospitalization after upper extremity DVT and pulmonary embolism.  Patient had recent shoulder surgery for left prosthetic shoulder infection with PICC line in place for antibiotics.  He presented to the hospital with complaints of chest pain and shortness of breath.  He was found to have a right axillary and left IJ vein DVT.  CT showed nonocclusive PE in the right lower lobe without evidence of RV strain.  No evidence of lower extremity DVT.    No known malignancy/chemotherapy. No hormonal therapy. PICC removed 3/31/25.   He was started on anticoagulation with Eliquis and is tolerating well      Social History     Tobacco Use    Smoking status: Never    Smokeless tobacco: Never   Vaping Use    Vaping status: Never Used   Substance Use Topics    Alcohol use: Not Currently     Comment: socially    Drug use: Never       Outpatient Medications:  Current Outpatient Medications   Medication Instructions    acetaminophen (TYLENOL) 650 mg, oral, Every 6 hours PRN    apixaban (Eliquis) 5 mg tablet Take 2 tablets (10 mg) by mouth 2 times a day for 6 days, THEN 1 tablet (5 mg) 2 times a day.    ciprofloxacin (CIPRO) 500 mg, oral, 2 times daily    clindamycin (CLEOCIN) 300 mg, oral, Every 6 hours    meloxicam (MOBIC) 15 mg, Daily         Vitals:  Vitals:    04/07/25 0907   BP: 114/70   Pulse: 60       Physical Exam:  General: NAD, well-appearing  HEENT: moist mucous membranes, no jaundice  Neck: No JVD, no carotid bruit  Lungs: CTA tiffany, no wheezing or rales  Cardiac: RRR, no murmurs  Abdomen: soft, non-tender, non-distended  Extremities: 2+ radial pulses, no edema,  palpable pulses  Skin: warm, dry, no wound  Neurologic: AAOx3,  no focal deficits       Reviewed Study(s):    Vascular Venous Duplex Bilateral Lower 4/1/25:  Right Lower Venous: No evidence of acute deep vein thrombus visualized in the right lower extremity.  Left Lower Venous: No evidence of acute deep vein thrombus visualized in the left lower extremity.  Additional Findings:  There is a lymph node in the right groin that measures 2.3 cm. There is a lymph  node in the left groin that measures 2.0 cm.    Vascular Venous Duplex Bilateral Upper 3/31/25:  Critical Result: DVT in R Axil V and L IJV  Notification called to Sergei Sorai MD on 3/31/2025 Acknowledged critical results notification communicated via secure chat by Janice Montnaez RVT.     Right Upper Venous: There is acute non-occlusive deep vein thrombosis visualized in the axillary vein. Additional Findings; IV Line. visualized in right basilic vein, axcillary vein and subclavian vein.  Left Upper Venous: There is acute occlusive deep vein thrombosis visualized in the internal jugular vein.     Lipid Panel 8/23/24:  TC- 194  TG- 154  HDL- 44  LDL-130    CT Coronary Artery- 10/30/24:  Calcium score of 76 Agatston units.     Assessment/Plan   Diagnoses and all orders for this visit:  Single subsegmental pulmonary embolism without acute cor pulmonale  Acute deep vein thrombosis (DVT) of axillary vein of right upper extremity  Chronic deep vein thrombosis (DVT) of internal jugular vein (Multi)  Mixed hyperlipidemia  BMI 33.0-33.9,adult  Never smoked tobacco        #RLL PE, Right axillary vein DVT, and Left internal jugular vein DVT   #IVC Filter in place   -Patient now presenting with recurrent episode of pulmonary embolism.  This was in the setting of recent shoulder surgery, which would be considered a minor risk factor.  -Plan to continue anticoagulation with full dose Eliquis for 6 months.  He would benefit from long-term prophylactic anticoagulation with low-dose  Eliquis given recurrent events  -patient is planning possible reversal shoulder ortho surgery in near future, currently has ortho spacer in shoulder 2' prosthetic implant infection. Would prefer 3 months of anticoagulation therapy prior to holding for elective surgery, if surgery needs to happen prior- would recommend Lovenox bridging prior.     #CAD, manifested on CCTA   #Hyperlipidemia  #Family history CAD  -discussed risk factors, modifications with patient  -patient will work on diet modifications and activity, will re evaluate lipid in future.   - On Eliquis      RTC 6 months       Ashanti Bloom MD [facility]  Interventional Cardiology  Endovascular Interventions  [email or web address]    I,Daniel Bland RN   am scribing for, and in the presence of Dr. Ashanti Bloom MD [facility] .    I, Dr. Ashanti Bloom MD [facility] , personally performed the services described in the documentation as scribed by Daniel Bland RN   in my presence, and confirm it is both accurate and complete.     **Disclaimer: This note was dictated by speech recognition, and every effort has been made to prevent any error in transcription, however minor errors may be present**

## 2025-04-07 NOTE — PROGRESS NOTES
Physical Therapy    Physical Therapy Treatment    Patient Name: Ernesto Thurston  MRN: [id number]  Today's Date: 4/7/2025    Time Entry:   Time Calculation  Start Time: 1601  Stop Time: 1644  Time Calculation (min): 43 min     PT Therapeutic Procedures Time Entry  Manual Therapy Time Entry: 22  Therapeutic Exercise Time Entry: 21                 Insurance:  [employer]  10% coins  BMN V  PA not req  Visit: 4  POC: 10    Assessment:  Session focused on PROM and introduce AAROM into all directions of shoulder motion. Pt with improved active tolerance with activity but continues to benefit from skilled therapy to increase ROM and decrease pain.      Plan:     Continue to progress ROM of the shoulder as tolerated  Current Problem  No diagnosis found.      General  PT  Visit  PT Received On: 04/07/25  Response to Previous Treatment: Patient with no complaints from previous session.  General  Reason for Referral: L shoulder pain s/p left shoulder implant removal and antibiotic spacer placement on 3/4/25  Referred By: Danita Amaro PA-C  Past Medical History Relevant to Rehab: OA, MED, L shoulder infection    Subjective    Patient arrives after surgery and admission s/p PE/DVT with clearance from DR. Piedra without sling, and reports pain at end range as expected.   Precautions     Pain  Pain Assessment  Pain Assessment: 0-10  0-10 (Numeric) Pain Score: 2  Pain Type: Surgical pain  Pain Location: Shoulder    Objective   L shoulder PROM   ABD in supine 94  Flex in supine 126    Treatments:  Therapeutic exercises:  Supine wand chest press x15 NT  PROM L shoulder within pain and tissue tolerance  Grade 2-3 joint mob to L shoulder NT  Prayer stretch: [id number] NT  PROM into flex, ext, IR, ER, abd: x12 min  AAROM into ER, IR, Abd, flex with cane for improved hand position: 2x10 each direction  R side lying PROM into shoulder flex on board with pillow case: x10 min    OP EDUCATION:       Goals:  By discharge:  1. Patient will report and  demonstrate independence with established HEP  2. Patient will report pain of 0/10 at rest, and no greater than 2/10 with any activity including reaching, lifting, dressing, and bathing  3. Patient will demonstrate passive shoulder ER >/= 30 deg, and elevation in the flexion and scaption planes >/= 130 deg to maintain functional ROM prior to next surgery   4. Patient will demonstrate a decrease in QuickDash score by 13 points to </=  30/55 to meet established MCID for the outcome measure (baseline 3/17/25, 43/55)  5. Patient will report the ability to sleep through the night 5/7 nights per week uninterrupted by pain to improve overall function throughout the day  6. Patient will demonstrate AROM of the L shoulder >/= 120 deg of elevation and ER to C2 to indicate an improved ability to dress, bathe, and perform all other activities without restrictions   (Additional goals to be set if orthopedic restrictions changed prior to next surgery)    Chele Uriostegui  4/7/25

## 2025-04-07 NOTE — PATIENT INSTRUCTIONS
Patient to follow up in 6 months with Dr. Ashanti Bloom MD [facility]     Please continue Eliquis therapy as recommended. Would recommend lifelong therapy as most recent episodes of blood clots were unprovoked most likely. Goal will be Eliquis 5mg twice daily for at least 3 months before surgery, unless orthopedics is aware you will need Lovenox bridging to protect you. After 6 months of Eliquis 5mg we may consider reducing dose to a smaller prophylactic dose.     You are able to travel, get a aisle seat, make sure to take blood thinners on your carry on.     No other changes today.   Continue same medications and treatments.   Patient educated on proper medication use.   Patient educated on risk factor modification.   Please bring any lab results from other providers / physicians to your next appointment.     Please bring all medicines, vitamins, and herbal supplements with you when you come to the office.     Prescriptions will not be filled unless you are compliant with your follow up appointments or have a follow up appointment scheduled as per instruction of your physician. Refills should be requested at the time of your visit.    IDaniel RN am scribing for and in the presence of Dr. Ashanti Bloom MD [facility]

## 2025-04-09 ENCOUNTER — OFFICE VISIT (OUTPATIENT)
Dept: ORTHOPEDIC SURGERY | Facility: HOSPITAL | Age: 45
End: 2025-04-09
Payer: COMMERCIAL

## 2025-04-09 DIAGNOSIS — T84.59XA INFECTION ASSOCIATED WITH PROSTHESIS OF LEFT SHOULDER JOINT: ICD-10-CM

## 2025-04-09 DIAGNOSIS — Z96.612 INFECTION ASSOCIATED WITH PROSTHESIS OF LEFT SHOULDER JOINT: ICD-10-CM

## 2025-04-09 LAB
ACID FAST STN SPEC: NORMAL
MYCOBACTERIUM SPEC CULT: NORMAL

## 2025-04-09 PROCEDURE — 99211 OFF/OP EST MAY X REQ PHY/QHP: CPT | Performed by: ORTHOPAEDIC SURGERY

## 2025-04-09 ASSESSMENT — PAIN SCALES - GENERAL: PAINLEVEL_OUTOF10: 1

## 2025-04-09 ASSESSMENT — PAIN - FUNCTIONAL ASSESSMENT: PAIN_FUNCTIONAL_ASSESSMENT: 0-10

## 2025-04-09 NOTE — PROGRESS NOTES
Doing well and is comfortable with regard to his shoulder he has been progressing well with therapy.  However he recently had a pulmonary embolus and was admitted to the hospital.  Currently he is on Eliquis and feeling good.    On exam left shoulder incision has healed well.  Motion is painless.  Radial pulse palpable no peripheral edema.    Cultures grew cutibacterium acnes.    Recent change in antibiotics per Dr. Knapp    Patient will continue on antibiotics for another week until seen by Dr. Knapp then they will decide about discontinuance.  2 weeks later will have an aspiration and if negative will consider reimplantation.    Patient may return to work with no specific restrictions.    This was dictated using voice recognition software and not corrected for grammatical or spelling errors.

## 2025-04-09 NOTE — LETTER
April 9, 2025     Patient: Ernesto Thurston   YOB: 1980   Date of Visit: 4/9/2025       To Whom It May Concern:    It is my medical opinion that Ernesto Thurston may return to work 4/28/2025 with no restrictions.    If you have any questions or concerns, please don't hesitate to call.         Sincerely,      Juanito Crain MD

## 2025-04-10 ENCOUNTER — TREATMENT (OUTPATIENT)
Dept: PHYSICAL THERAPY | Facility: CLINIC | Age: 45
End: 2025-04-10
Payer: COMMERCIAL

## 2025-04-10 DIAGNOSIS — M25.512 LEFT SHOULDER PAIN, UNSPECIFIED CHRONICITY: Primary | ICD-10-CM

## 2025-04-10 DIAGNOSIS — T84.59XD INFECTION OF PROSTHETIC SHOULDER JOINT, SUBSEQUENT ENCOUNTER: ICD-10-CM

## 2025-04-10 DIAGNOSIS — Z96.619 INFECTION OF PROSTHETIC SHOULDER JOINT, SUBSEQUENT ENCOUNTER: ICD-10-CM

## 2025-04-10 PROCEDURE — 97110 THERAPEUTIC EXERCISES: CPT | Mod: GP | Performed by: PHYSICAL THERAPIST

## 2025-04-10 ASSESSMENT — PAIN SCALES - GENERAL: PAINLEVEL_OUTOF10: 0 - NO PAIN

## 2025-04-10 ASSESSMENT — PAIN - FUNCTIONAL ASSESSMENT: PAIN_FUNCTIONAL_ASSESSMENT: 0-10

## 2025-04-10 NOTE — PROGRESS NOTES
Physical Therapy    Physical Therapy Treatment    Patient Name: Ernesto Thurston  MRN: [id number]  Today's Date: 4/10/2025    Time Entry:   Time Calculation  Start Time: 1601  Stop Time: 1641  Time Calculation (min): 40 min     PT Therapeutic Procedures Time Entry  Therapeutic Exercise Time Entry: 40                 Insurance:  [employer]  10% coins  BMN V  PA not req  Visit: 6  POC: 10    Assessment:  PT progresses AAROM this session for additional flexibility of the shoulder. Shoulder ROM is improving steadily, but stiffness at end ROM in all planes continues. HEP updated for additional progression in these areas outside of PT sessions. Eduction provided to avoid pushing shoulder into significant amounts of pain. He is progressing well, and remains a good candidate for additional skilled PT.      Plan:     Continue to progress ROM of the shoulder as tolerated  Current Problem  1. Left shoulder pain, unspecified chronicity        2. Infection of prosthetic shoulder joint, subsequent encounter              General     General  Reason for Referral: L shoulder pain s/p left shoulder implant removal and antibiotic spacer placement on 3/4/25  Referred By: Danita Amaro PA-C  Past Medical History Relevant to Rehab: OA, MED, L shoulder infection    Subjective    Patient reports that the shoulder has been feeling good. He denies any pain at the start of session but does feel stiffness.   Precautions  Precautions  STEADI Fall Risk Score (The score of 4 or more indicates an increased risk of falling): 0  Precautions Comment: Per updated referral 4/9/25:   ROM as tolerated and gentle PRE     Pain  Pain Assessment  Pain Assessment: 0-10  0-10 (Numeric) Pain Score: 0 - No pain  Pain Type: Surgical pain  Pain Location: Shoulder    Objective   L shoulder PROM   Flexion 127  Scaption 124  ER (45 deg abd) 25      Treatments:  Therapeutic exercises:  PROM L shoulder within pain and tissue tolerance  Supine wand chest press x15  Supine wand  "chest press with flexion x15  Supine wand ER x15   Pulleys flexion/scaption x2' ea   Flexion submaximal isometric 10x5\"     Units: 3    OP EDUCATION:     4/10/25:  Exercises  - Supine Shoulder Press AAROM in Abduction with Dowel  - 1-2 x daily - 7 x weekly - 1 sets - 10 reps - 5 sec hold  - Supine Shoulder Flexion Extension AAROM with Dowel  - 1-2 x daily - 7 x weekly - 1 sets - 10 reps - 5sec hold  - Supine Shoulder External Rotation with Dowel at 20 Degrees of Abduction  - 1-2 x daily - 7 x weekly - 1 sets - 10 reps - 5 sec hold      Goals:  By discharge:  1. Patient will report and demonstrate independence with established HEP  2. Patient will report pain of 0/10 at rest, and no greater than 2/10 with any activity including reaching, lifting, dressing, and bathing  3. Patient will demonstrate passive shoulder ER >/= 30 deg, and elevation in the flexion and scaption planes >/= 130 deg to maintain functional ROM prior to next surgery   4. Patient will demonstrate a decrease in QuickDash score by 13 points to </=  30/55 to meet established MCID for the outcome measure (baseline 3/17/25, 43/55)  5. Patient will report the ability to sleep through the night 5/7 nights per week uninterrupted by pain to improve overall function throughout the day  6. Patient will demonstrate AROM of the L shoulder >/= 120 deg of elevation and ER to C2 to indicate an improved ability to dress, bathe, and perform all other activities without restrictions   (Additional goals to be set if orthopedic restrictions changed prior to next surgery)  "

## 2025-04-11 DIAGNOSIS — T84.59XA INFECTION ASSOCIATED WITH PROSTHESIS OF LEFT SHOULDER JOINT: ICD-10-CM

## 2025-04-11 DIAGNOSIS — Z96.612 INFECTION ASSOCIATED WITH PROSTHESIS OF LEFT SHOULDER JOINT: ICD-10-CM

## 2025-04-15 ENCOUNTER — TREATMENT (OUTPATIENT)
Dept: PHYSICAL THERAPY | Facility: CLINIC | Age: 45
End: 2025-04-15
Payer: COMMERCIAL

## 2025-04-15 DIAGNOSIS — T84.59XD INFECTION OF PROSTHETIC SHOULDER JOINT, SUBSEQUENT ENCOUNTER: Primary | ICD-10-CM

## 2025-04-15 DIAGNOSIS — Z96.619 INFECTION OF PROSTHETIC SHOULDER JOINT, SUBSEQUENT ENCOUNTER: Primary | ICD-10-CM

## 2025-04-15 DIAGNOSIS — M25.512 LEFT SHOULDER PAIN, UNSPECIFIED CHRONICITY: ICD-10-CM

## 2025-04-15 PROCEDURE — 97110 THERAPEUTIC EXERCISES: CPT | Mod: GP | Performed by: PHYSICAL THERAPIST

## 2025-04-15 NOTE — PROGRESS NOTES
Physical Therapy    Physical Therapy Treatment    Patient Name: Ernesto Thurston  MRN: [id number]  Today's Date: 4/15/2025    Time Entry:   Time Calculation  Start Time: 1603  Stop Time: 1642  Time Calculation (min): 39 min     PT Therapeutic Procedures Time Entry  Therapeutic Exercise Time Entry: 39                 Insurance:  [employer]MO[employer]  10% coins  BMN V  PA not req  Visit: 7  POC: 10    Assessment:  PT progresses therapeutic exercises with gentle introdcuction to strengthening with the addition of scapular stabilization exercises and deltoid isometrics. Patient is challenged with new additions, and reports increased muscular fatigue at end of session, but no significant increases pain. Passive ROM of the shoulder improves slightly compared to previous sessions. HEP updated for additional strengthening outside of PT sessions.      Plan:     Continue to progress ROM of the shoulder as tolerated  Current Problem  1. Infection of prosthetic shoulder joint, subsequent encounter        2. Left shoulder pain, unspecified chronicity                General     General  Reason for Referral: L shoulder pain s/p left shoulder implant removal and antibiotic spacer placement on 3/4/25  Referred By: Danita Amaro PA-C  Past Medical History Relevant to Rehab: OA, MED, L shoulder infection    Subjective    Patient reports that his shoulder is feeling a little more tight today, and he is unsure why. Notes increased fatigue after last session, but denies any increased pain.   Precautions  Precautions  STEADI Fall Risk Score (The score of 4 or more indicates an increased risk of falling): 0  Precautions Comment: Per updated referral 4/9/25:   ROM as tolerated and gentle PRE     Pain       Objective   L shoulder PROM   Flexion 134  Scaption 125  ER (45 deg abd) 31      Treatments:  Therapeutic exercises:  PROM L shoulder within pain and tissue tolerance  Supine wand chest press x15 NT  Supine wand chest press with flexion x15  Supine wand ER  "x15   Pulleys flexion/scaption x2' ea   deltoid submaximal isometric 10x5\"   Rows RTT x15  Shoulder ext RTT x15    Units: 3    OP EDUCATION:     4/10/25:  Exercises  - Supine Shoulder Press AAROM in Abduction with Dowel  - 1-2 x daily - 7 x weekly - 1 sets - 10 reps - 5 sec hold  - Supine Shoulder Flexion Extension AAROM with Dowel  - 1-2 x daily - 7 x weekly - 1 sets - 10 reps - 5sec hold  - Supine Shoulder External Rotation with Dowel at 20 Degrees of Abduction  - 1-2 x daily - 7 x weekly - 1 sets - 10 reps - 5 sec hold    4/15/25:   Exercises  - Isometric Shoulder Flexion at Wall  - 1 x daily - 7 x weekly - 1 sets - 10 reps - 5sec hold  - Isometric Shoulder Abduction at Wall  - 1 x daily - 7 x weekly - 1 sets - 10 reps - 5sec hold  - Isometric Shoulder Extension at Wall  - 1 x daily - 7 x weekly - 1 sets - 10 reps - 5sec hold      Goals:  By discharge:  1. Patient will report and demonstrate independence with established HEP  2. Patient will report pain of 0/10 at rest, and no greater than 2/10 with any activity including reaching, lifting, dressing, and bathing  3. Patient will demonstrate passive shoulder ER >/= 30 deg, and elevation in the flexion and scaption planes >/= 130 deg to maintain functional ROM prior to next surgery   4. Patient will demonstrate a decrease in QuickDash score by 13 points to </=  30/55 to meet established MCID for the outcome measure (baseline 3/17/25, 43/55)  5. Patient will report the ability to sleep through the night 5/7 nights per week uninterrupted by pain to improve overall function throughout the day  6. Patient will demonstrate AROM of the L shoulder >/= 120 deg of elevation and ER to C2 to indicate an improved ability to dress, bathe, and perform all other activities without restrictions   (Additional goals to be set if orthopedic restrictions changed prior to next surgery)  "

## 2025-04-16 LAB
ACID FAST STN SPEC: NORMAL
MYCOBACTERIUM SPEC CULT: NORMAL

## 2025-04-17 LAB
ACID FAST STN SPEC: NORMAL
MYCOBACTERIUM SPEC CULT: NORMAL

## 2025-04-18 DIAGNOSIS — T84.59XA INFECTION ASSOCIATED WITH PROSTHESIS OF LEFT SHOULDER JOINT: ICD-10-CM

## 2025-04-18 DIAGNOSIS — Z96.612 INFECTION ASSOCIATED WITH PROSTHESIS OF LEFT SHOULDER JOINT: ICD-10-CM

## 2025-04-22 LAB
ACID FAST STN SPEC: NORMAL
MYCOBACTERIUM SPEC CULT: NORMAL

## 2025-04-23 ENCOUNTER — PATIENT OUTREACH (OUTPATIENT)
Dept: CARDIOLOGY | Facility: CLINIC | Age: 45
End: 2025-04-23
Payer: COMMERCIAL

## 2025-04-23 DIAGNOSIS — Z96.612 INFECTION ASSOCIATED WITH PROSTHESIS OF LEFT SHOULDER JOINT: Primary | ICD-10-CM

## 2025-04-23 DIAGNOSIS — T84.59XA INFECTION ASSOCIATED WITH PROSTHESIS OF LEFT SHOULDER JOINT: Primary | ICD-10-CM

## 2025-04-23 NOTE — PROGRESS NOTES
Confirmation of at least 2 patient identifiers.    Completed telephonic follow-up with patient after recent visit with Office Visit with Ashanti Foreman MD (04/07/2025)     Office Visit with Juanito Crain MD (04/09/2025)   Dr Giuseppe Boucher 4/15/25    Spoke to patient during outreach call.    Patient reports feeling: doing okay, had a few issues getting a hold of offices but is now using [employer] for communication and going well.   Next step is setting up aspiration and culture of fluid.     Patient has questions or concerns about medications: No    Have all prescribed medications been filled? Yes    Patient has necessary resources to manage their care? Yes    Patient has questions or concerns? No    Next care management follow-up approximately within one month.    Patient is active preferred [email or web address] User. Aware next outreach will be through Jack Erwin but can reach out to me directly at [phone] if any non emergent needs arise.

## 2025-04-25 DIAGNOSIS — Z96.612 INFECTION ASSOCIATED WITH PROSTHESIS OF LEFT SHOULDER JOINT: ICD-10-CM

## 2025-04-25 DIAGNOSIS — T84.59XA INFECTION ASSOCIATED WITH PROSTHESIS OF LEFT SHOULDER JOINT: ICD-10-CM

## 2025-04-28 ENCOUNTER — TREATMENT (OUTPATIENT)
Dept: PHYSICAL THERAPY | Facility: CLINIC | Age: 45
End: 2025-04-28
Payer: COMMERCIAL

## 2025-04-28 DIAGNOSIS — T84.59XD INFECTION OF PROSTHETIC SHOULDER JOINT, SUBSEQUENT ENCOUNTER: Primary | ICD-10-CM

## 2025-04-28 DIAGNOSIS — M25.512 LEFT SHOULDER PAIN, UNSPECIFIED CHRONICITY: ICD-10-CM

## 2025-04-28 DIAGNOSIS — Z96.619 INFECTION OF PROSTHETIC SHOULDER JOINT, SUBSEQUENT ENCOUNTER: Primary | ICD-10-CM

## 2025-04-28 PROCEDURE — 97110 THERAPEUTIC EXERCISES: CPT | Mod: GP | Performed by: PHYSICAL THERAPIST

## 2025-04-28 ASSESSMENT — PAIN - FUNCTIONAL ASSESSMENT: PAIN_FUNCTIONAL_ASSESSMENT: 0-10

## 2025-04-28 ASSESSMENT — PAIN SCALES - GENERAL: PAINLEVEL_OUTOF10: 0 - NO PAIN

## 2025-04-28 NOTE — PROGRESS NOTES
Physical Therapy    Physical Therapy Treatment    Patient Name: Ernesto Thurston  MRN: [id number]  Today's Date: 4/28/2025    Time Entry:   Time Calculation  Start Time: 1646  Stop Time: 1725  Time Calculation (min): 39 min     PT Therapeutic Procedures Time Entry  Therapeutic Exercise Time Entry: 39                 Insurance:  MMOH  10% coins  BMN V  PA not req  Visit: 8  POC: 10    Assessment:  PT progresses strengthening to the left shoulder with addition of IR/ER step outs which challenge the patient. No increase in pain is noted, but increased muscular fatigue as expected for given activities. Stiffness at end range of motion continues in al planes, but small improvements in PROM is found this date compared to previous sessions. He remains below baseline function and would benefit from additional skilled PT.          Plan:     Continue to progress ROM of the shoulder as tolerated  Current Problem  1. Infection of prosthetic shoulder joint, subsequent encounter        2. Left shoulder pain, unspecified chronicity                  General     General  Reason for Referral: L shoulder pain s/p left shoulder implant removal and antibiotic spacer placement on 3/4/25  Referred By: Danita Amaro PA-C  Past Medical History Relevant to Rehab: OA, MED, L shoulder infection    Subjective    Pt reports that he has been feeling good for the most part. Denies pain at this time, just some stiffness in the arm.     Precautions  Precautions  STEADI Fall Risk Score (The score of 4 or more indicates an increased risk of falling): 0  Precautions Comment: Per updated referral 4/9/25:   ROM as tolerated and gentle PRE     Pain  Pain Assessment  Pain Assessment: 0-10  0-10 (Numeric) Pain Score: 0 - No pain  Pain Type: Surgical pain  Pain Location: Shoulder    Objective   L shoulder PROM   Flexion 136  Scaption 124  ER (45 deg abd) 35      Treatments:  Therapeutic exercises:  PROM L shoulder within pain and tissue tolerance  UBE AAROM L1.0  "F/R 2' ea ALT  Supine wand chest press x15 NT  Supine wand chest press with flexion x15  Supine wand ER x15   Pulleys flexion/scaption x2' ea   deltoid submaximal isometric 10x5\" NT  Rows RTT 2x10  Shoulder ext RTT 2x10  IR/ER step outs GTT/YTT x15 ea     Units: 3    OP EDUCATION:     4/10/25:  Exercises  - Supine Shoulder Press AAROM in Abduction with Dowel  - 1-2 x daily - 7 x weekly - 1 sets - 10 reps - 5 sec hold  - Supine Shoulder Flexion Extension AAROM with Dowel  - 1-2 x daily - 7 x weekly - 1 sets - 10 reps - 5sec hold  - Supine Shoulder External Rotation with Dowel at 20 Degrees of Abduction  - 1-2 x daily - 7 x weekly - 1 sets - 10 reps - 5 sec hold    4/15/25:   Exercises  - Isometric Shoulder Flexion at Wall  - 1 x daily - 7 x weekly - 1 sets - 10 reps - 5sec hold  - Isometric Shoulder Abduction at Wall  - 1 x daily - 7 x weekly - 1 sets - 10 reps - 5sec hold  - Isometric Shoulder Extension at Wall  - 1 x daily - 7 x weekly - 1 sets - 10 reps - 5sec hold      Goals:  By discharge:  1. Patient will report and demonstrate independence with established HEP  2. Patient will report pain of 0/10 at rest, and no greater than 2/10 with any activity including reaching, lifting, dressing, and bathing  3. Patient will demonstrate passive shoulder ER >/= 30 deg, and elevation in the flexion and scaption planes >/= 130 deg to maintain functional ROM prior to next surgery   4. Patient will demonstrate a decrease in QuickDash score by 13 points to </=  30/55 to meet established MCID for the outcome measure (baseline 3/17/25, 43/55)  5. Patient will report the ability to sleep through the night 5/7 nights per week uninterrupted by pain to improve overall function throughout the day  6. Patient will demonstrate AROM of the L shoulder >/= 120 deg of elevation and ER to C2 to indicate an improved ability to dress, bathe, and perform all other activities without restrictions   (Additional goals to be set if orthopedic " restrictions changed prior to next surgery)

## 2025-04-29 ENCOUNTER — APPOINTMENT (OUTPATIENT)
Dept: CARDIOLOGY | Facility: CLINIC | Age: 45
End: 2025-04-29
Payer: COMMERCIAL

## 2025-04-30 ENCOUNTER — APPOINTMENT (OUTPATIENT)
Dept: PHYSICAL THERAPY | Facility: CLINIC | Age: 45
End: 2025-04-30
Payer: COMMERCIAL

## 2025-05-07 ENCOUNTER — PREP FOR PROCEDURE (OUTPATIENT)
Dept: ORTHOPEDIC SURGERY | Facility: HOSPITAL | Age: 45
End: 2025-05-07
Payer: COMMERCIAL

## 2025-05-07 ENCOUNTER — APPOINTMENT (OUTPATIENT)
Dept: PHYSICAL THERAPY | Facility: CLINIC | Age: 45
End: 2025-05-07
Payer: COMMERCIAL

## 2025-05-07 DIAGNOSIS — Z96.612 INFECTION ASSOCIATED WITH PROSTHESIS OF LEFT SHOULDER JOINT: ICD-10-CM

## 2025-05-07 DIAGNOSIS — Z98.890 HISTORY OF REMOVAL OF JOINT PROSTHESIS OF LEFT SHOULDER DUE TO INFECTION: ICD-10-CM

## 2025-05-07 DIAGNOSIS — Z86.19 HISTORY OF REMOVAL OF JOINT PROSTHESIS OF LEFT SHOULDER DUE TO INFECTION: ICD-10-CM

## 2025-05-07 DIAGNOSIS — T84.59XA INFECTION ASSOCIATED WITH PROSTHESIS OF LEFT SHOULDER JOINT: ICD-10-CM

## 2025-05-07 NOTE — PROGRESS NOTES
"Physical Therapy    Physical Therapy Treatment    Patient Name: Ernesto Thurston  MRN: [id number]  Today's Date: 5/7/2025    Time Entry:                           Insurance:  MMOH  10% coins  BMJOHANNY RUIZ not req  Visit: 9  POC: 10    Assessment:  ***         Plan:     Continue to progress ROM of the shoulder as tolerated  Current Problem  No diagnosis found.            General          Subjective    ***    Precautions        Pain       Objective   L shoulder PROM   Flexion 136  Scaption 124  ER (45 deg abd) 35      Treatments:  Therapeutic exercises:  PROM L shoulder within pain and tissue tolerance  UBE AAROM L1.0 F/R 2' ea ALT  Supine wand chest press x15 NT  Supine wand chest press with flexion x15  Supine wand ER x15   Pulleys flexion/scaption x2' ea   deltoid submaximal isometric 10x5\" NT  Rows RTT 2x10  Shoulder ext RTT 2x10  IR/ER step outs GTT/YTT x15 ea     Units: ***    OP EDUCATION:     4/10/25:  Exercises  - Supine Shoulder Press AAROM in Abduction with Dowel  - 1-2 x daily - 7 x weekly - 1 sets - 10 reps - 5 sec hold  - Supine Shoulder Flexion Extension AAROM with Dowel  - 1-2 x daily - 7 x weekly - 1 sets - 10 reps - 5sec hold  - Supine Shoulder External Rotation with Dowel at 20 Degrees of Abduction  - 1-2 x daily - 7 x weekly - 1 sets - 10 reps - 5 sec hold    4/15/25:   Exercises  - Isometric Shoulder Flexion at Wall  - 1 x daily - 7 x weekly - 1 sets - 10 reps - 5sec hold  - Isometric Shoulder Abduction at Wall  - 1 x daily - 7 x weekly - 1 sets - 10 reps - 5sec hold  - Isometric Shoulder Extension at Wall  - 1 x daily - 7 x weekly - 1 sets - 10 reps - 5sec hold      Goals:  By discharge:  1. Patient will report and demonstrate independence with established HEP  2. Patient will report pain of 0/10 at rest, and no greater than 2/10 with any activity including reaching, lifting, dressing, and bathing  3. Patient will demonstrate passive shoulder ER >/= 30 deg, and elevation in the flexion and scaption " planes >/= 130 deg to maintain functional ROM prior to next surgery   4. Patient will demonstrate a decrease in QuickDash score by 13 points to </=  30/55 to meet established MCID for the outcome measure (baseline 3/17/25, 43/55)  5. Patient will report the ability to sleep through the night 5/7 nights per week uninterrupted by pain to improve overall function throughout the day  6. Patient will demonstrate AROM of the L shoulder >/= 120 deg of elevation and ER to C2 to indicate an improved ability to dress, bathe, and perform all other activities without restrictions   (Additional goals to be set if orthopedic restrictions changed prior to next surgery)

## 2025-05-14 ENCOUNTER — TREATMENT (OUTPATIENT)
Dept: PHYSICAL THERAPY | Facility: CLINIC | Age: 45
End: 2025-05-14
Payer: COMMERCIAL

## 2025-05-14 DIAGNOSIS — M25.512 LEFT SHOULDER PAIN, UNSPECIFIED CHRONICITY: ICD-10-CM

## 2025-05-14 DIAGNOSIS — Z96.619 INFECTION OF PROSTHETIC SHOULDER JOINT, SUBSEQUENT ENCOUNTER: Primary | ICD-10-CM

## 2025-05-14 DIAGNOSIS — T84.59XD INFECTION OF PROSTHETIC SHOULDER JOINT, SUBSEQUENT ENCOUNTER: Primary | ICD-10-CM

## 2025-05-14 PROCEDURE — 97110 THERAPEUTIC EXERCISES: CPT | Mod: GP | Performed by: PHYSICAL THERAPIST

## 2025-05-14 ASSESSMENT — PAIN - FUNCTIONAL ASSESSMENT: PAIN_FUNCTIONAL_ASSESSMENT: 0-10

## 2025-05-14 ASSESSMENT — PAIN SCALES - GENERAL: PAINLEVEL_OUTOF10: 0 - NO PAIN

## 2025-05-14 NOTE — PROGRESS NOTES
Physical Therapy    Physical Therapy Treatment    Patient Name: Ernesto Thurston  MRN: [id number]  Today's Date: 5/14/2025    Time Entry:   Time Calculation  Start Time: 1602  Stop Time: 1642  Time Calculation (min): 40 min     PT Therapeutic Procedures Time Entry  Therapeutic Exercise Time Entry: 40                 Insurance:  MMOH  10% coins  BMN V  PA not req  Visit: #9  POC: 10      Assessment:  PT session progresses therapeutic exercises to improve ROM and strength of the upper extremity within pain tolerance. Patient is challenged with new additions, and reports increased muscular fatigue following activities. Improvements in PROM of the shoulder are noted in all planes of motion. Overall, he is progressing well. PT will plan to perform recheck for potential discharge vs. Extension of POC next session. If surgery is to take place in the beginning of June PT will be discharged. If surgery is pushed back, PT will add additional visits to ensure no regression in status prior to operation.          Plan:  OP PT Plan  PT Plan: Skilled PT  Rehab Potential: Good  Plan of Care Agreement: Patient  Continue to progress ROM of the shoulder as tolerated      Current Problem  1. Infection of prosthetic shoulder joint, subsequent encounter        2. Left shoulder pain, unspecified chronicity                    General     General  Reason for Referral: L shoulder pain s/p left shoulder implant removal and antibiotic spacer placement on 3/4/25  Referred By: Danita Amaro PA-C  Past Medical History Relevant to Rehab: OA, MED, L shoulder infection    Subjective    Patient denies pain at the start of session. Reports good tolerance and compliance to HEP     Precautions  Precautions  STEADI Fall Risk Score (The score of 4 or more indicates an increased risk of falling): 0  Precautions Comment: Per updated referral 4/9/25:   ROM as tolerated and gentle PRE     Pain  Pain Assessment  Pain Assessment: 0-10  0-10 (Numeric) Pain Score: 0  "- No pain  Pain Type: Surgical pain  Pain Location: Shoulder    Objective   L shoulder PROM   Flexion 136  Scaption 127  ER (45 deg abd) 37      Treatments:  Therapeutic exercises:  PROM L shoulder within pain and tissue tolerance   UBE L1.0 F/R 2' ea ALT  Supine wand chest press x15 NT  Supine wand chest press with flexion x15 NT  Supine wand ER x15 NT  Pulleys flexion/scaption x2' ea   deltoid submaximal isometric 10x5\" NT  Rows GTT 2x10  Shoulder ext GTT 2x10  Shoulder add RTT 2x10  IR/ER step outs GTT/RTT x15 ea   Wall ladder flex/abd x10 ea     Units: 3    OP EDUCATION:     4/10/25:  Exercises  - Supine Shoulder Press AAROM in Abduction with Dowel  - 1-2 x daily - 7 x weekly - 1 sets - 10 reps - 5 sec hold  - Supine Shoulder Flexion Extension AAROM with Dowel  - 1-2 x daily - 7 x weekly - 1 sets - 10 reps - 5sec hold  - Supine Shoulder External Rotation with Dowel at 20 Degrees of Abduction  - 1-2 x daily - 7 x weekly - 1 sets - 10 reps - 5 sec hold    4/15/25:   Exercises  - Isometric Shoulder Flexion at Wall  - 1 x daily - 7 x weekly - 1 sets - 10 reps - 5sec hold  - Isometric Shoulder Abduction at Wall  - 1 x daily - 7 x weekly - 1 sets - 10 reps - 5sec hold  - Isometric Shoulder Extension at Wall  - 1 x daily - 7 x weekly - 1 sets - 10 reps - 5sec hold      Goals:  By discharge:  1. Patient will report and demonstrate independence with established HEP  2. Patient will report pain of 0/10 at rest, and no greater than 2/10 with any activity including reaching, lifting, dressing, and bathing  3. Patient will demonstrate passive shoulder ER >/= 30 deg, and elevation in the flexion and scaption planes >/= 130 deg to maintain functional ROM prior to next surgery   4. Patient will demonstrate a decrease in QuickDash score by 13 points to </=  30/55 to meet established MCID for the outcome measure (baseline 3/17/25, 43/55)  5. Patient will report the ability to sleep through the night 5/7 nights per week " uninterrupted by pain to improve overall function throughout the day  6. Patient will demonstrate AROM of the L shoulder >/= 120 deg of elevation and ER to C2 to indicate an improved ability to dress, bathe, and perform all other activities without restrictions   (Additional goals to be set if orthopedic restrictions changed prior to next surgery)

## 2025-05-15 ENCOUNTER — HOSPITAL ENCOUNTER (OUTPATIENT)
Dept: RADIOLOGY | Facility: HOSPITAL | Age: 45
Discharge: HOME | End: 2025-05-15
Payer: COMMERCIAL

## 2025-05-15 DIAGNOSIS — Z96.612 INFECTION ASSOCIATED WITH PROSTHESIS OF LEFT SHOULDER JOINT: ICD-10-CM

## 2025-05-15 DIAGNOSIS — T84.59XA INFECTION ASSOCIATED WITH PROSTHESIS OF LEFT SHOULDER JOINT: ICD-10-CM

## 2025-05-15 PROCEDURE — 87075 CULTR BACTERIA EXCEPT BLOOD: CPT | Mod: STJLAB | Performed by: ORTHOPAEDIC SURGERY

## 2025-05-15 PROCEDURE — 2500000004 HC RX 250 GENERAL PHARMACY W/ HCPCS (ALT 636 FOR OP/ED): Mod: JZ | Performed by: ORTHOPAEDIC SURGERY

## 2025-05-15 PROCEDURE — 20610 DRAIN/INJ JOINT/BURSA W/O US: CPT | Mod: LT

## 2025-05-15 RX ORDER — LIDOCAINE HYDROCHLORIDE 10 MG/ML
5 INJECTION, SOLUTION EPIDURAL; INFILTRATION; INTRACAUDAL; PERINEURAL ONCE
Status: COMPLETED | OUTPATIENT
Start: 2025-05-15 | End: 2025-05-15

## 2025-05-15 RX ADMIN — LIDOCAINE HYDROCHLORIDE 50 MG: 10 INJECTION, SOLUTION EPIDURAL; INFILTRATION; INTRACAUDAL; PERINEURAL at 10:32

## 2025-05-18 LAB
BACTERIA FLD CULT: NORMAL
GRAM STN SPEC: NORMAL
GRAM STN SPEC: NORMAL

## 2025-05-21 ENCOUNTER — TREATMENT (OUTPATIENT)
Dept: PHYSICAL THERAPY | Facility: CLINIC | Age: 45
End: 2025-05-21
Payer: COMMERCIAL

## 2025-05-21 DIAGNOSIS — T84.59XD INFECTION OF PROSTHETIC SHOULDER JOINT, SUBSEQUENT ENCOUNTER: Primary | ICD-10-CM

## 2025-05-21 DIAGNOSIS — Z96.619 INFECTION OF PROSTHETIC SHOULDER JOINT, SUBSEQUENT ENCOUNTER: Primary | ICD-10-CM

## 2025-05-21 DIAGNOSIS — M25.512 LEFT SHOULDER PAIN, UNSPECIFIED CHRONICITY: ICD-10-CM

## 2025-05-21 PROCEDURE — 97110 THERAPEUTIC EXERCISES: CPT | Mod: GP | Performed by: PHYSICAL THERAPIST

## 2025-05-21 ASSESSMENT — PAIN - FUNCTIONAL ASSESSMENT: PAIN_FUNCTIONAL_ASSESSMENT: 0-10

## 2025-05-21 ASSESSMENT — PAIN SCALES - GENERAL: PAINLEVEL_OUTOF10: 0 - NO PAIN

## 2025-05-21 NOTE — PROGRESS NOTES
Physical Therapy    Physical Therapy Treatment    Patient Name: Ernesto Thurston  MRN: [id number]  Today's Date: 5/21/2025    Time Entry:   Time Calculation  Start Time: 1602  Stop Time: 1643  Time Calculation (min): 41 min     PT Therapeutic Procedures Time Entry  Therapeutic Exercise Time Entry: 41                 Insurance:  [employer]  10% coins  BMN V  PA not req  Visit: #10  POC: 10      Assessment:  Patient has completed 10 therapy visits up to this point, and have met 4/6 established therapy goals. The patient has progressed well, and has shown improvements in pain intensity, strength, A/PROM of the shoulder. At this time, the patient remains below functional baseline with intermittent pain in the shoulder, weakness of the shoulder, and decreased ROM of the upper extremity. Patient is scheduled to receive his second shoulder surgery in the next two weeks provided a good result from his aspiration. If surgery moves forward as scheduled, PT will be discharged at that time, with objective measures obtained today acting as discharge measurements. However, if surgery is postponed, PT will continue 1x/week to maintain ROM and strength prior to surgery.         Plan:  OP PT Plan  PT Plan: Skilled PT  Rehab Potential: Good  Plan of Care Agreement: Patient  Continue to progress ROM of the shoulder as tolerated      Current Problem  1. Infection of prosthetic shoulder joint, subsequent encounter        2. Left shoulder pain, unspecified chronicity                      General     General  Reason for Referral: L shoulder pain s/p left shoulder implant removal and antibiotic spacer placement on 3/4/25  Referred By: Danita Amaro PA-C  Past Medical History Relevant to Rehab: OA, MED, L shoulder infection    Subjective    Patient reports that his shoulder has been feeling good. Denies pain at the start of session. Pain has been at worst a 3/10 in the last week or so. Sleep is better, and the shoulder is only waking him ~1 day per  "week.     Precautions  Precautions  STEADI Fall Risk Score (The score of 4 or more indicates an increased risk of falling): 0  Precautions Comment: Per updated referral 4/9/25:   ROM as tolerated and gentle PRE     Pain  Pain Assessment  Pain Assessment: 0-10  0-10 (Numeric) Pain Score: 0 - No pain  Pain Type: Surgical pain  Pain Location: Shoulder    Objective   Shoulder AROM (*indicates pain): R from IE  Flexion R 132, L NT  Abd R 101, L NT  ER R occiput, L occiput    Shoulder PROM (*indicates pain):   Flexion R NT, L 82*  Scaption R NT, L 76*  ER R NT, L (45 deg abd) 34*    QuickDash: 23; 27.27%        Treatments:  Therapeutic exercises:  PROM L shoulder within pain and tissue tolerance   UBE L1.0 F/R 2' ea ALT  Supine wand chest press x15 NT  Supine wand chest press with flexion x15 NT  Supine wand ER x15 NT  Pulleys flexion/scaption x2' ea   deltoid submaximal isometric 10x5\" NT  Rows GTT 2x10  Shoulder ext GTT 2x10  Shoulder add RTT 2x10  IR/ER step outs GTT/RTT x15 ea   Wall ladder flex/abd x10 ea     Units: 3    OP EDUCATION:     4/10/25:  Exercises  - Supine Shoulder Press AAROM in Abduction with Dowel  - 1-2 x daily - 7 x weekly - 1 sets - 10 reps - 5 sec hold  - Supine Shoulder Flexion Extension AAROM with Dowel  - 1-2 x daily - 7 x weekly - 1 sets - 10 reps - 5sec hold  - Supine Shoulder External Rotation with Dowel at 20 Degrees of Abduction  - 1-2 x daily - 7 x weekly - 1 sets - 10 reps - 5 sec hold    4/15/25:   Exercises  - Isometric Shoulder Flexion at Wall  - 1 x daily - 7 x weekly - 1 sets - 10 reps - 5sec hold  - Isometric Shoulder Abduction at Wall  - 1 x daily - 7 x weekly - 1 sets - 10 reps - 5sec hold  - Isometric Shoulder Extension at Wall  - 1 x daily - 7 x weekly - 1 sets - 10 reps - 5sec hold      Goals:  By discharge:  1. Patient will report and demonstrate independence with established HEP MET  2. Patient will report pain of 0/10 at rest, and no greater than 2/10 with any activity " including reaching, lifting, dressing, and bathing PARTIALLY MET  3. Patient will demonstrate passive shoulder ER >/= 30 deg, and elevation in the flexion and scaption planes >/= 130 deg to maintain functional ROM prior to next surgery MET  4. Patient will demonstrate a decrease in QuickDash score by 13 points to </=  30/55 to meet established MCID for the outcome measure (baseline 3/17/25, 43/55) MET  5. Patient will report the ability to sleep through the night 5/7 nights per week uninterrupted by pain to improve overall function throughout the day MET  6. Patient will demonstrate AROM of the L shoulder >/= 120 deg of elevation and ER to C2 to indicate an improved ability to dress, bathe, and perform all other activities without restrictions NOT MET

## 2025-05-23 LAB
BACTERIA FLD CULT: NORMAL
GRAM STN SPEC: NORMAL
GRAM STN SPEC: NORMAL

## 2025-05-27 ENCOUNTER — TELEPHONE (OUTPATIENT)
Dept: PREADMISSION TESTING | Facility: HOSPITAL | Age: 45
End: 2025-05-27
Payer: COMMERCIAL

## 2025-05-28 ENCOUNTER — PRE-ADMISSION TESTING (OUTPATIENT)
Dept: PREADMISSION TESTING | Facility: HOSPITAL | Age: 45
End: 2025-05-28
Payer: COMMERCIAL

## 2025-05-28 ENCOUNTER — APPOINTMENT (OUTPATIENT)
Dept: LAB | Facility: HOSPITAL | Age: 45
End: 2025-05-28
Payer: COMMERCIAL

## 2025-05-28 ENCOUNTER — HOSPITAL ENCOUNTER (OUTPATIENT)
Dept: CARDIOLOGY | Facility: HOSPITAL | Age: 45
Discharge: HOME | End: 2025-05-28
Payer: COMMERCIAL

## 2025-05-28 ENCOUNTER — APPOINTMENT (OUTPATIENT)
Dept: PHYSICAL THERAPY | Facility: CLINIC | Age: 45
End: 2025-05-28
Payer: COMMERCIAL

## 2025-05-28 VITALS
HEIGHT: 65 IN | WEIGHT: 201.94 LBS | TEMPERATURE: 98.5 F | OXYGEN SATURATION: 97 % | RESPIRATION RATE: 16 BRPM | HEART RATE: 68 BPM | DIASTOLIC BLOOD PRESSURE: 90 MMHG | BODY MASS INDEX: 33.65 KG/M2 | SYSTOLIC BLOOD PRESSURE: 133 MMHG

## 2025-05-28 DIAGNOSIS — I82.A11 ACUTE DEEP VEIN THROMBOSIS (DVT) OF AXILLARY VEIN OF RIGHT UPPER EXTREMITY: Primary | ICD-10-CM

## 2025-05-28 DIAGNOSIS — Z01.818 PREOP TESTING: ICD-10-CM

## 2025-05-28 DIAGNOSIS — T84.59XA INFECTION OF PROSTHETIC SHOULDER JOINT, INITIAL ENCOUNTER: ICD-10-CM

## 2025-05-28 DIAGNOSIS — I82.C29 CHRONIC DEEP VEIN THROMBOSIS (DVT) OF INTERNAL JUGULAR VEIN (MULTI): Primary | ICD-10-CM

## 2025-05-28 DIAGNOSIS — Z96.619 INFECTION OF PROSTHETIC SHOULDER JOINT, INITIAL ENCOUNTER: ICD-10-CM

## 2025-05-28 LAB
ABO GROUP (TYPE) IN BLOOD: NORMAL
ANTIBODY SCREEN: NORMAL
EST. AVERAGE GLUCOSE BLD GHB EST-MCNC: 97 MG/DL
HBA1C MFR BLD: 5 % (ref ?–5.7)
RH FACTOR (ANTIGEN D): NORMAL

## 2025-05-28 PROCEDURE — 83036 HEMOGLOBIN GLYCOSYLATED A1C: CPT

## 2025-05-28 PROCEDURE — 86850 RBC ANTIBODY SCREEN: CPT

## 2025-05-28 PROCEDURE — 99204 OFFICE O/P NEW MOD 45 MIN: CPT

## 2025-05-28 PROCEDURE — 85652 RBC SED RATE AUTOMATED: CPT

## 2025-05-28 PROCEDURE — 86900 BLOOD TYPING SEROLOGIC ABO: CPT

## 2025-05-28 PROCEDURE — 93005 ELECTROCARDIOGRAM TRACING: CPT

## 2025-05-28 PROCEDURE — 87081 CULTURE SCREEN ONLY: CPT | Mod: AHULAB

## 2025-05-28 PROCEDURE — 86901 BLOOD TYPING SEROLOGIC RH(D): CPT

## 2025-05-28 RX ORDER — CHLORHEXIDINE GLUCONATE ORAL RINSE 1.2 MG/ML
15 SOLUTION DENTAL AS NEEDED
Qty: 473 ML | Refills: 0 | Status: SHIPPED | OUTPATIENT
Start: 2025-05-28 | End: 2025-05-30

## 2025-05-28 RX ORDER — ENOXAPARIN SODIUM 100 MG/ML
90 INJECTION SUBCUTANEOUS 2 TIMES DAILY
Qty: 12 EACH | Refills: 0 | Status: SHIPPED | OUTPATIENT
Start: 2025-05-28 | End: 2025-05-31 | Stop reason: WASHOUT

## 2025-05-28 ASSESSMENT — ENCOUNTER SYMPTOMS
NEUROLOGICAL NEGATIVE: 1
CONSTITUTIONAL NEGATIVE: 1
GASTROINTESTINAL NEGATIVE: 1
CARDIOVASCULAR NEGATIVE: 1
NECK NEGATIVE: 1
BRUISES/BLEEDS EASILY: 1
ENDOCRINE NEGATIVE: 1
RESPIRATORY NEGATIVE: 1
ARTHRALGIAS: 1

## 2025-05-28 NOTE — CPM/PAT NURSE NOTE
CPM/PAT Nurse Note      Name: Ernesto Thurston (Ernesto Thurston)  /Age: [birth year]//45 y.o.       Medical History[1]    Surgical History[2]    Patient Sexual activity questions deferred to the physician.    Family History[3]    Allergies[4]    Prior to Admission medications    Medication Sig Start Date End Date Taking? Authorizing Provider   acetaminophen (Tylenol) 325 mg tablet Take 2 tablets (650 mg) by mouth every 6 hours if needed for mild pain (1 - 3), fever (temp greater than 38.0 C) or headaches. 4/3/25  Yes Gaurav Schmidt PA-C   apixaban (Eliquis) 5 mg tablet Take 1 tablet (5 mg) by mouth 2 times a day. 25  Yes Ashanti Foreman MD   chlorhexidine (Peridex) 0.12 % solution Use 15 mL in the mouth or throat if needed for wound care (swish and spit 15 mL for 30 seconds night prior to surgery and morning of surgery) for up to 2 days. 25  KAREN Valdez-CNP   meloxicam (Mobic) 15 mg tablet Take 1 tablet (15 mg) by mouth once daily.  Patient not taking: Reported on 2025    Historical Provider, MD JACKSON WARD Risk Score    No data to display       Caprini DVT Assessment      Flowsheet Row ED to Hosp-Admission (Discharged) from 3/31/2025 in [facility] [place] 2 with Blaine Rubin MD Admission (Discharged) from 3/4/2025 in [facility] [place][note 3] Bldg A 7 with Juanito Crain MD   DVT Score (IF A SCORE IS NOT CALCULATING, MUST SELECT A BMI TO COMPLETE) 7 filed at 2025 1105 7 filed at 2025 1623   Medical Factors History of DVT/PE filed at 2025 1105 --   Surgical Factors -- Major surgery planned, lasting 2-3 hours filed at 2025 1623   BMI (BMI MUST BE CHOSEN) 31-40 (Obesity) filed at 2025 1105 31-40 (Obesity) filed at 2025 1623          Modified Frailty Index    No data to display       [id number] Stroke Risk Points  Current as of just now        N/A 0 to 9 Points:      Last Change: N/A          The [id number]  risk score (Kenny FOSTER et al. 2009. © 2010 [employer]) quantifies the risk of stroke for a patient with atrial fibrillation. For patients without atrial fibrillation or under the age of 18 this score appears as N/A. Higher score values generally indicate higher risk of stroke.        This score is not applicable to this patient. Components are not calculated.          Revised Cardiac Risk Index    No data to display       Apfel Simplified Score    No data to display       Risk Analysis Index Results This Encounter    No data found in the last 10 encounters.       Prodigy: High Risk  Total Score: 8              Prodigy Gender Score          ARISCAT Score for Postoperative Pulmonary Complications    No data to display       Mills Perioperative Risk for Myocardial Infarction or Cardiac Arrest (SANTI)    No data to display         Nurse Plan of Action:     After Visit Summary (AVS) reviewed and patient verbalized good understanding of medications and NPO instructions.  Pre-op infection prevention measures:  CHG showers and mouthwash reviewed, understanding voiced.  CHG soap given and patient verbalized need to pick CHG mouthwash at their preferred local pharmacy.              [1]   Past Medical History:  Diagnosis Date    Agatston coronary artery calcium score less than 100     CT Coronary Artery- 10/30/24: Calcium score of 76 Agatston units.    Anticoagulated on Eliquis     Asthma     Cardiology follow-up encounter     Ashanti Foreman MD LOV 4/7/25    DVT (deep venous thrombosis) (Multi) 03/31/2025    upper extremity DVT    ED (erectile dysfunction)     History of removal of joint prosthesis of left shoulder due to infection     Plan: Left Shoulder Revision Arthroplasty with Removal of Antibiotic Spacer 6/3/25    Hyperlipidemia     Infection of prosthetic shoulder joint     s/p Left Shoulder Open Debridement; Excision of Heterotopic Ossification; Removal of Prosthesis; Insertion of  Antibiotic Spacer 3/4/25    Mass of skin of left shoulder     Obesity     Osteoarthritis of both hips resulting from hip dysplasia     PONV (postoperative nausea and vomiting)     Presence of IVC filter 2005    Pulmonary embolism     most recent 3/31/2025; h/o Post-op PE   [2]   Past Surgical History:  Procedure Laterality Date    ABCESS DRAINAGE Left 02/15/2023    shoulder    ABCESS DRAINAGE Left 11/13/2024    left shoulder, wound cultures    ANTERIOR CRUCIATE LIGAMENT REPAIR  2005    EMBOLECTOMY      Pulmonary Artery Embolectomy    HERNIA REPAIR      MENISCECTOMY Left 12/08/2021    OTHER SURGICAL HISTORY  2005    IVC filter placed    SHOULDER SURGERY Left 03/2025    SHOULDER SURGERY Left 03/04/2025    Left Shoulder Open Debridement; Excision of Heterotopic Ossification; Removal of Prosthesis; Insertion of Antibiotic Spacer    TOTAL SHOULDER ARTHROPLASTY Left 2014    VASECTOMY      WOUND EXPLORATION  09/02/2022    EXPLORATION LEFT SHOULDER, I&D   [3]   Family History  Problem Relation Name Age of Onset    Dementia Mother          mild    Heart attack Father      Coronary artery disease Father          h/o CABG    Osteoarthritis Sister      Osteoarthritis Sister      Osteoarthritis Brother      Coronary artery disease Paternal Grandfather     [4]   Allergies  Allergen Reactions    Percocet [Oxycodone-Acetaminophen] Nausea/vomiting     Norco OK

## 2025-05-28 NOTE — H&P (VIEW-ONLY)
[facility]/PAT Evaluation       Name: Ernesto Thurston (Ernesto Thurston)  /Age: 1980/45 y.o.     In-Person       Chief Complaint: left shoulder pain    HPI      Date of Consult: 25    Referring Provider:  Dr. Juanito Crain    Date, Surgery, and Length:  6/3/25, Left Shoulder Revision Arthroplasty with Removal of Antibiotic Spacer, 145 minutes      Patient presents to [facility] for perioperative risk assessment prior to scheduled surgery. 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 He went to OR on 3/4 for explant/spacer. This time Serratia did not grow and there was only 1 colony of C. Acnes. Overall impression was still of significant prosthetic joint infection given findings in OR and the presentation in general of shoulder pain.         This note was created in part upon personal review of patient's medical records.    Pt reports PONV- uses scopalamine patch which helps.    Pt denies any past history of anesthetic complications such as PONV, awareness, prolonged sedation, dental damage, aspiration, cardiac arrest, difficult intubation, difficult I.V. access or unexpected hospital admissions. No history of malignant hyperthermia and or pseudocholinesterase deficiency.    No history of blood transfusions.    The patient IS NOT a [employer] and will accept blood and blood products if medically indicated.     Type and screen WAS sent.    Past Medical History:   Diagnosis Date    Agatston coronary artery calcium score less than 100     CT Coronary Artery- 10/30/24: Calcium score of 76 Agatston units.    Anticoagulated on Eliquis     Asthma     Cardiology follow-up encounter     Ashanit Foreman MD LOV 25    DVT (deep venous thrombosis) (Multi) 2025    upper extremity DVT    ED (erectile dysfunction)     History of removal of joint prosthesis of left shoulder due to infection     Plan: Left Shoulder Revision Arthroplasty with Removal of  Antibiotic Spacer 6/3/25    Hyperlipidemia     Infection of prosthetic shoulder joint     s/p Left Shoulder Open Debridement; Excision of Heterotopic Ossification; Removal of Prosthesis; Insertion of Antibiotic Spacer 3/4/25    Mass of skin of left shoulder     Obesity     Osteoarthritis of both hips resulting from hip dysplasia     PONV (postoperative nausea and vomiting)     Presence of IVC filter 2005    Pulmonary embolism     most recent 3/31/2025; h/o Post-op PE       Past Surgical History:   Procedure Laterality Date    ABCESS DRAINAGE Left 02/15/2023    shoulder    ABCESS DRAINAGE Left 11/13/2024    left shoulder, wound cultures    ANTERIOR CRUCIATE LIGAMENT REPAIR  2005    EMBOLECTOMY      Pulmonary Artery Embolectomy    HERNIA REPAIR      MENISCECTOMY Left 12/08/2021    OTHER SURGICAL HISTORY  2005    IVC filter placed    SHOULDER SURGERY Left 03/2025    SHOULDER SURGERY Left 03/04/2025    Left Shoulder Open Debridement; Excision of Heterotopic Ossification; Removal of Prosthesis; Insertion of Antibiotic Spacer    TOTAL SHOULDER ARTHROPLASTY Left 2014    VASECTOMY      WOUND EXPLORATION  09/02/2022    EXPLORATION LEFT SHOULDER, I&D       Family History   Problem Relation Name Age of Onset    Dementia Mother          mild    Heart attack Father      Coronary artery disease Father          h/o CABG    Coronary artery disease Paternal Grandfather       Social History     Tobacco Use   Smoking Status Never   Smokeless Tobacco Never     Social History     Substance and Sexual Activity   Alcohol Use Not Currently    Comment: socially     Social History     Substance and Sexual Activity   Drug Use Never       Allergies   Allergen Reactions    Percocet [Oxycodone-Acetaminophen] Nausea/vomiting     Norco OK       Current Outpatient Medications   Medication Instructions    acetaminophen (TYLENOL) 650 mg, oral, Every 6 hours PRN    apixaban (ELIQUIS) 5 mg, oral, 2 times daily    meloxicam (MOBIC) 15 mg, Daily       "    PAT ROS:   Constitutional:   neg    Neuro/Psych:   neg    Eyes:    use of corrective lenses (wears contacts)  Ears:   neg    Nose:   neg    Mouth:   neg    Throat:   neg    Neck:   neg    Cardio:   neg    Respiratory:   neg    Endocrine:   neg    GI:   neg    :   neg    Musculoskeletal:    arthralgias (int left shoulder pain)  Hematologic:    bruises/bleeds easily (bleeds easily on Eliquis)  Skin:  neg        Physical Exam  Vitals reviewed.   Constitutional:       Appearance: Normal appearance.   Cardiovascular:      Rate and Rhythm: Normal rate and regular rhythm.      Heart sounds: No murmur heard.     No friction rub. No gallop.   Pulmonary:      Effort: Pulmonary effort is normal.      Breath sounds: Normal breath sounds.   Musculoskeletal:      Cervical back: Normal range of motion.   Neurological:      Mental Status: He is alert.          PAT AIRWAY:   Airway:     Mallampati::  III    Neck ROM::  Full  normal        Visit Vitals  /90   Pulse 68   Temp 36.9 °C (98.5 °F)   Resp 16   Ht 1.651 m (5' 5\")   Wt 91.6 kg (201 lb 15.1 oz)   SpO2 97%   BMI 33.60 kg/m²   Smoking Status Never   BSA 2.05 m²           Patient is a 45 y.o.  male scheduled for Left Shoulder Revision Arthroplasty with Removal of Antibiotic Spacer with Dr. Crain 6/3/25.      Plan      Neuro:  No neurologic diagnosis or significant findings on chart review, clinical presentation and evaluation.  No grossly apparent neurologic perioperative risk.  Patient is at increased risk for perioperative CVA secondary to  perioperative interruption of anticoagulant        Cardiovascular: pt with h/o PE and DVT, recently 3/2025 s/p shoulder surgery, following with Dr. Bloom, on Eliquis.  LOV 4/7/25:  #RLL PE, Right axillary vein DVT, and Left internal jugular vein DVT   #IVC Filter in place   -Patient now presenting with recurrent episode of pulmonary embolism.  This was in the setting of recent shoulder surgery, which would be " considered a minor risk factor.  -Plan to continue anticoagulation with full dose Eliquis for 6 months.  He would benefit from long-term prophylactic anticoagulation with low-dose Eliquis given recurrent events  -patient is planning possible reversal shoulder ortho surgery in near future, currently has ortho spacer in shoulder 2' prosthetic implant infection. Would prefer 3 months of anticoagulation therapy prior to holding for elective surgery, if surgery needs to happen prior- would recommend Lovenox bridging prior.     RCRI: 0 Risk of Mace: 3.9%    Caprini: 10    Patient denies any chest pain, tightness, heaviness, pressure, radiating pain, palpitations, irregular heartbeats, lightheadedness, cough, congestion, shortness of breath, PETERSON, PND, near syncope, weight loss or gain.     Good functional capacity (>4 METS)      EKG in PAT NSR        Pulmonary:  No pulmonary diagnosis, however patient is at increased risk of perioperative complications secondary to  obesity, duration of surgery > 2 hours, types of anesthetic  Stop Bang score is 2 placing patient at low risk for ANGELA  ARISCAT: <26 points, 1.6% risk of in-hospital postoperative pulmonary complication  PRODIGY: Moderate risk for opioid induced respiratory depression  Pumonary toilet education discussed, patient also provided deep breathing exercises and incentive spirometry educational handout          Heme:  Patient instructed to ambulate as soon as possible postoperatively to decrease thromboembolic risk.    Initiate mechanical DVT prophylaxis as soon as possible and initiate chemical prophylaxis when deemed safe from a bleeding standpoint post surgery.          Risk assessment complete.  This patient is INTERMEDIATE risk candidate undergoing MODERATE risk procedure, patient is medically optimized for surgery.        Labs/testing obtained in PAT on 5/28/25: A1C, MRSA, T&S, EKG    Lab Results   Component Value Date    HGBA1C 5.0 05/28/2025     Lab Results    Component Value Date    SEDRATE 2 05/28/2025     Lab Results   Component Value Date    WBC 3.1 (L) 04/03/2025    HGB 14.1 04/03/2025    HCT 42.4 04/03/2025    MCV 82 04/03/2025     04/03/2025     Lab Results   Component Value Date    GLUCOSE 83 04/03/2025    CALCIUM 8.8 04/03/2025     04/03/2025    K 4.1 04/03/2025    CO2 24 04/03/2025     04/03/2025    BUN 18 04/03/2025    CREATININE 0.93 04/03/2025       Type And Screen Is this order related to pregnancy or an upcoming surgery? Yes; Where will this surgery/delivery be performed? [facility]; What is the date of the surgery? 6/3/2025; Has this patient ever had a transfusion? Unknown; Has t...  Order: [id number]   Status: Final result       Dx: Chronic deep vein thrombosis (DVT) of...    Test Result Released: Yes (not seen)    0 Result Notes      Component 1 d ago   ABO TYPE O   Rh TYPE POS   ANTIBODY SCREEN NEG   Resulting Agency [facility]             Specimen Collected: 05/28/25 13:27     Follow up/communication: None  *Dr. Bloom was contacted and is bridging pt's Lovenox with his last dose 24 hr prior to surgery*      Preoperative medication instructions were provided and reviewed with the patient.  Any additional testing or evaluation was explained to the patient.  Nothing by mouth instructions were discussed and patient's questions were answered prior to conclusion to this encounter.  Patient verbalized understanding of preoperative instructions given in preadmission testing; discharge instructions available in EMR.    This note was dictated with speech recognition.  Minor errors may have been detected during use of speech recognition.

## 2025-05-28 NOTE — CPM/PAT H&P
[facility]/PAT Evaluation       Name: Ernesto Thurston (Ernesto Thurston)  /Age: 1980/45 y.o.     In-Person       Chief Complaint: left shoulder pain    HPI      Date of Consult: 25    Referring Provider:  Dr. Juanito Crain    Date, Surgery, and Length:  6/3/25, Left Shoulder Revision Arthroplasty with Removal of Antibiotic Spacer, 145 minutes      Patient presents to [facility] for perioperative risk assessment prior to scheduled surgery. 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 He went to OR on 3/4 for explant/spacer. This time Serratia did not grow and there was only 1 colony of C. Acnes. Overall impression was still of significant prosthetic joint infection given findings in OR and the presentation in general of shoulder pain.         This note was created in part upon personal review of patient's medical records.    Pt reports PONV- uses scopalamine patch which helps.    Pt denies any past history of anesthetic complications such as PONV, awareness, prolonged sedation, dental damage, aspiration, cardiac arrest, difficult intubation, difficult I.V. access or unexpected hospital admissions. No history of malignant hyperthermia and or pseudocholinesterase deficiency.    No history of blood transfusions.    The patient IS NOT a Hindu and will accept blood and blood products if medically indicated.     Type and screen WAS sent.    Past Medical History:   Diagnosis Date    Agatston coronary artery calcium score less than 100     CT Coronary Artery- 10/30/24: Calcium score of 76 Agatston units.    Anticoagulated on Eliquis     Asthma     Cardiology follow-up encounter     Ashanti Foreman MD LOV 25    DVT (deep venous thrombosis) (Multi) 2025    upper extremity DVT    ED (erectile dysfunction)     History of removal of joint prosthesis of left shoulder due to infection     Plan: Left Shoulder Revision Arthroplasty with Removal of  Antibiotic Spacer 6/3/25    Hyperlipidemia     Infection of prosthetic shoulder joint     s/p Left Shoulder Open Debridement; Excision of Heterotopic Ossification; Removal of Prosthesis; Insertion of Antibiotic Spacer 3/4/25    Mass of skin of left shoulder     Obesity     Osteoarthritis of both hips resulting from hip dysplasia     PONV (postoperative nausea and vomiting)     Presence of IVC filter 2005    Pulmonary embolism     most recent 3/31/2025; h/o Post-op PE       Past Surgical History:   Procedure Laterality Date    ABCESS DRAINAGE Left 02/15/2023    shoulder    ABCESS DRAINAGE Left 11/13/2024    left shoulder, wound cultures    ANTERIOR CRUCIATE LIGAMENT REPAIR  2005    EMBOLECTOMY      Pulmonary Artery Embolectomy    HERNIA REPAIR      MENISCECTOMY Left 12/08/2021    OTHER SURGICAL HISTORY  2005    IVC filter placed    SHOULDER SURGERY Left 03/2025    SHOULDER SURGERY Left 03/04/2025    Left Shoulder Open Debridement; Excision of Heterotopic Ossification; Removal of Prosthesis; Insertion of Antibiotic Spacer    TOTAL SHOULDER ARTHROPLASTY Left 2014    VASECTOMY      WOUND EXPLORATION  09/02/2022    EXPLORATION LEFT SHOULDER, I&D       Family History   Problem Relation Name Age of Onset    Dementia Mother          mild    Heart attack Father      Coronary artery disease Father          h/o CABG    Coronary artery disease Paternal Grandfather       Social History     Tobacco Use   Smoking Status Never   Smokeless Tobacco Never     Social History     Substance and Sexual Activity   Alcohol Use Not Currently    Comment: socially     Social History     Substance and Sexual Activity   Drug Use Never       Allergies   Allergen Reactions    Percocet [Oxycodone-Acetaminophen] Nausea/vomiting     Norco OK       Current Outpatient Medications   Medication Instructions    acetaminophen (TYLENOL) 650 mg, oral, Every 6 hours PRN    apixaban (ELIQUIS) 5 mg, oral, 2 times daily    meloxicam (MOBIC) 15 mg, Daily       "    PAT ROS:   Constitutional:   neg    Neuro/Psych:   neg    Eyes:    use of corrective lenses (wears contacts)  Ears:   neg    Nose:   neg    Mouth:   neg    Throat:   neg    Neck:   neg    Cardio:   neg    Respiratory:   neg    Endocrine:   neg    GI:   neg    :   neg    Musculoskeletal:    arthralgias (int left shoulder pain)  Hematologic:    bruises/bleeds easily (bleeds easily on Eliquis)  Skin:  neg        Physical Exam  Vitals reviewed.   Constitutional:       Appearance: Normal appearance.   Cardiovascular:      Rate and Rhythm: Normal rate and regular rhythm.      Heart sounds: No murmur heard.     No friction rub. No gallop.   Pulmonary:      Effort: Pulmonary effort is normal.      Breath sounds: Normal breath sounds.   Musculoskeletal:      Cervical back: Normal range of motion.   Neurological:      Mental Status: He is alert.          PAT AIRWAY:   Airway:     Mallampati::  III    Neck ROM::  Full  normal        Visit Vitals  /90   Pulse 68   Temp 36.9 °C (98.5 °F)   Resp 16   Ht 1.651 m (5' 5\")   Wt 91.6 kg (201 lb 15.1 oz)   SpO2 97%   BMI 33.60 kg/m²   Smoking Status Never   BSA 2.05 m²           Patient is a 45 y.o.  male scheduled for Left Shoulder Revision Arthroplasty with Removal of Antibiotic Spacer with Dr. Crain 6/3/25.      Plan      Neuro:  No neurologic diagnosis or significant findings on chart review, clinical presentation and evaluation.  No grossly apparent neurologic perioperative risk.  Patient is at increased risk for perioperative CVA secondary to  perioperative interruption of anticoagulant        Cardiovascular: pt with h/o PE and DVT, recently 3/2025 s/p shoulder surgery, following with Dr. Bloom, on Eliquis.  LOV 4/7/25:  #RLL PE, Right axillary vein DVT, and Left internal jugular vein DVT   #IVC Filter in place   -Patient now presenting with recurrent episode of pulmonary embolism.  This was in the setting of recent shoulder surgery, which would be " considered a minor risk factor.  -Plan to continue anticoagulation with full dose Eliquis for 6 months.  He would benefit from long-term prophylactic anticoagulation with low-dose Eliquis given recurrent events  -patient is planning possible reversal shoulder ortho surgery in near future, currently has ortho spacer in shoulder 2' prosthetic implant infection. Would prefer 3 months of anticoagulation therapy prior to holding for elective surgery, if surgery needs to happen prior- would recommend Lovenox bridging prior.     RCRI: 0 Risk of Mace: 3.9%    Caprini: 10    Patient denies any chest pain, tightness, heaviness, pressure, radiating pain, palpitations, irregular heartbeats, lightheadedness, cough, congestion, shortness of breath, PETERSON, PND, near syncope, weight loss or gain.     Good functional capacity (>4 METS)      EKG in PAT NSR            Pulmonary:  No pulmonary diagnosis, however patient is at increased risk of perioperative complications secondary to  obesity, duration of surgery > 2 hours, types of anesthetic  Stop Bang score is 2 placing patient at low risk for ANGELA  ARISCAT: <26 points, 1.6% risk of in-hospital postoperative pulmonary complication  PRODIGY: Moderate risk for opioid induced respiratory depression  Pumonary toilet education discussed, patient also provided deep breathing exercises and incentive spirometry educational handout          Heme:  Patient instructed to ambulate as soon as possible postoperatively to decrease thromboembolic risk.    Initiate mechanical DVT prophylaxis as soon as possible and initiate chemical prophylaxis when deemed safe from a bleeding standpoint post surgery.          Risk assessment complete.  This patient is INTERMEDIATE risk candidate undergoing MODERATE risk procedure, patient is medically optimized for surgery.        Labs/testing obtained in PAT on 5/28/25: A1C, MRSA, T&S, EKG    Lab Results   Component Value Date    HGBA1C 5.0 05/28/2025     Lab Results    Component Value Date    SEDRATE 2 05/28/2025     Lab Results   Component Value Date    WBC 3.1 (L) 04/03/2025    HGB 14.1 04/03/2025    HCT 42.4 04/03/2025    MCV 82 04/03/2025     04/03/2025     Lab Results   Component Value Date    GLUCOSE 83 04/03/2025    CALCIUM 8.8 04/03/2025     04/03/2025    K 4.1 04/03/2025    CO2 24 04/03/2025     04/03/2025    BUN 18 04/03/2025    CREATININE 0.93 04/03/2025       Type And Screen Is this order related to pregnancy or an upcoming surgery? Yes; Where will this surgery/delivery be performed? [facility]; What is the date of the surgery? 6/3/2025; Has this patient ever had a transfusion? Unknown; Has t...  Order: [id number]   Status: Final result       Dx: Chronic deep vein thrombosis (DVT) of...    Test Result Released: Yes (not seen)    0 Result Notes      Component 1 d ago   ABO TYPE O   Rh TYPE POS   ANTIBODY SCREEN NEG   Resulting Agency [facility]             Specimen Collected: 05/28/25 13:27     Follow up/communication: None  *Dr. Bloom was contacted and is bridging pt's Lovenox with his last dose 24 hr prior to surgery*      Preoperative medication instructions were provided and reviewed with the patient.  Any additional testing or evaluation was explained to the patient.  Nothing by mouth instructions were discussed and patient's questions were answered prior to conclusion to this encounter.  Patient verbalized understanding of preoperative instructions given in preadmission testing; discharge instructions available in EMR.    This note was dictated with speech recognition.  Minor errors may have been detected during use of speech recognition.

## 2025-05-28 NOTE — PREPROCEDURE INSTRUCTIONS
Medication List            Accurate as of May 28, 2025  4:04 PM. Always use your most recent med list.                acetaminophen 325 mg tablet  Commonly known as: Tylenol  Take 2 tablets (650 mg) by mouth every 6 hours if needed for mild pain (1 - 3), fever (temp greater than 38.0 C) or headaches.  Medication Adjustments for Surgery: Take/Use as prescribed     apixaban 5 mg tablet  Commonly known as: Eliquis  Take 1 tablet (5 mg) by mouth 2 times a day.  Medication Adjustments for Surgery: Take last dose 3 days before surgery  Notes to patient: Last dose: 5/30/25  You will be bridged to Lovenox by Dr. Bloom, please follow their instructions.     chlorhexidine 0.12 % solution  Commonly known as: Peridex  Use 15 mL in the mouth or throat if needed for wound care (swish and spit 15 mL for 30 seconds night prior to surgery and morning of surgery) for up to 2 days.  Medication Adjustments for Surgery: Take/Use as prescribed     meloxicam 15 mg tablet  Commonly known as: Mobic  Additional Medication Adjustments for Surgery: Other (Comment)  Notes to patient: Pt states he is no longer taking                          Preoperative Deep Breathing Exercises  Why it is important to do deep breathing exercises before my surgery?  Deep breathing exercises strengthen your breathing muscles.  This helps you to recover after your surgery and decreases the chance of breathing complications.  How are the deep breathing exercises done?  Sit straight with your back supported.  Breathe in deeply and slowly through your nose. Your lower rib cage should expand and your abdomen may move forward.  Hold that breath for 3 to 5 seconds.  Breathe out through pursed lips, slowly and completely.  Rest and repeat 10 times every hour while awake.  Rest longer if you become dizzy or lightheaded.        CONTACT SURGEON'S OFFICE IF YOU DEVELOP:  * Fever = 100.4 F   * New respiratory symptoms (e.g. cough, shortness of breath, respiratory  distress, sore throat)  * Recent loss of taste or smell  *Flu like symptoms such as headache, fatigue or gastrointestinal symptoms  * You develop any open sores, shingles, burning or painful urination   AND/OR:  * You no longer wish to have the surgery.  * Any other personal circumstances change that may lead to the need to cancel or defer this surgery.  *You were admitted to any hospital within one week of your planned procedure.    SMOKING:  *Quitting smoking can make a huge difference to your health and recovery from surgery.    *If you need help with quitting, call [phone]-QUIT-NOW.    THE DAY OF SURGERY:  *Do not eat any food after midnight the night before your surgery.   *YOU MUST drink 14 OUNCES of clear liquids TWO hours before your instructed ARRIVAL TIME to the hospital. This includes water, black tea/coffee (no milk or cream), apple juice, clear broth and electrolyte drinks (Gatorade).  Please avoid clear liquids that are red in color.   *You may chew gum/mints up to TWO hours before your surgery/procedure.    SURGICAL TIME:  *You will be contacted between 2 p.m. and 6 p.m. the business day before your surgery with your arrival time.  *If you haven't received a call by 6pm, call [phone].  *Scheduled surgery times may change and you will be notified if this occurs-check your personal voicemail for any updates.    ON THE MORNING OF SURGERY:  *Wear comfortable, loose fitting clothing.   *Do not use moisturizers, creams, lotions or perfume.  *All jewelry and valuables should be left at home.  *Prosthetic devices such as contact lenses, hearing aids, dentures, eyelash extensions, hairpins and body piercing must be removed before surgery.    BRING WITH YOU:  *Photo ID and insurance card  *Current list of medications and allergies  *Pacemaker/Defibrillator/Heart stent cards  *CPAP machine and mask  *Slings/splints/crutches  *Copy of your complete Advanced Directive/DHPOA-if applicable  *Neurostimulator  implant remote    PARKING AND ARRIVAL:  *Check in at the Main Entrance desk and let them know you are here for surgery.  *You will be directed to the 2nd floor surgical waiting area.    IF YOU ARE HAVING OUTPATIENT/SAME DAY SURGERY:  *A responsible adult MUST accompany you at the time of discharge and stay with you for 24 hours after your surgery.  *You may NOT drive yourself home after surgery.  *You may use a taxi or ride sharing service ([employer]), Uber) to return home ONLY if you are accompanied by a friend or family member.  *Instructions for resuming your medications will be provided by your surgeon.      Home Preoperative Antibacterial Shower     What is a home preoperative antibacterial shower?  This shower is a way of cleaning the skin with a germ killing soap before surgery.  The soap contains chlorhexidine, commonly known as CHG.  CHG is a soap for your skin with germ killing ability.  Let your doctor know if you are allergic to chlorhexidine.    Why do I need to take a preoperative antibacterial shower?  Skin is not sterile.  It is best to try to make your skin as free of germs as possible before surgery.  Proper cleansing with a germ killing soap before surgery can lower the number of germs on your skin.  This helps to reduce the risk of infection at the surgical site.  Following the instructions listed below will help you prepare your skin for surgery.      How do I use the CHG skin cleanser?  Steps:  Begin using your CHG soap five days before your scheduled surgery on ________________________.    Days 1-4 Shower before bed:  Wash your face and genitals with your normal soap and rinse.           2.    Apply the CHG soap to a clean wet washcloth.  Turn the water off or move away                From the water spray to avoid premature rinsing of the CHG soap as you are applying.     3.   Lather your entire body from the neck down.  Do not use on your face or genitals.  4. Pay special attention to the area(s)  where your incision(s) will be located unless they are on your face.  Avoid scrubbing your skin too hard.  The important point is to have the CHG soap sit on your skin for 3 minutes.    When the 3 minutes are up, turn on the water and rinse the CHG soap off your body completely.   Pat yourself dry with a clean, freshly-laundered towel.  Dress in clean, freshly laundered night clothes.    Be sure to change bed sheets and blankets at least on the first night of CHG body wash use. May change linens every night of the above protocol for maximum benefit.   Day 5:  Last shower is the morning of surgery: Follow above Instructions.    NOTE:        *Keep CHG soap out of eyes and ear canals   *DO NOT wash with regular soap on your body after you have used the CHG soap solution  *DO NOT apply powders, lotions, or perfume.  *Deodorant may be used days 1-4, BUT NOT the day of surgery    Who should I contact if I have any questions regarding the use of CHG soap?  Call the [facility], Preadmission Testing at [phone] if you have any questions.              Patient Information: Pre-Operative Infection Prevention Measures     Why did I have my nose, under my arms and groin swabbed?  The purpose of the swab is to identify Staphylococcus aureus inside your nose or on your skin.  The swab was sent to the laboratory for culture.  A positive swab/culture for Staphylococcus aureus is called colonization or carriage.      What is Staphylococcus aureus?  Staphylococcus aureus, also known as “staph”, is a germ found on the skin or in the nose of healthy people.  Sometimes Staphylococcus aureus can get into the body and cause an infection.  This can be minor (such as pimples, boils or other skin problems).  It might also be serious (such as blood infection, pneumonia or a surgical site infection).    What is Staphylococcus aureus colonization or carriage?  Colonization or carriage means that a person has the  germ but is not sick from it.  These bacteria can be spread on the hands or when breathing or sneezing.    How is Staphylococcus aureus spread?  It is most often spread by close contact with a person or item that carries it.    What happens if my culture is positive for Staphylococcus aureus?  Your doctor/medical team will use this information to guide any antibiotic treatment which may be necessary.  Regardless of the culture results, we will clean the inside of your nose with a betadine swab just before you have your surgery.      Will I get an infection if I have Staphylococcus aureus in my nose or on my skin?  Anyone can get an infection with Staphylococcus aureus.  However, the best way to reduce your risk of infection is to follow the instructions provided to you for the use of your CHG soap and dental rinse.        Who should I contact if I have any questions?  Call the [facility], Preadmission Testing at [phone] if you have any questions.          Patient Information: Oral/Dental Rinse  **This is a prescription; pick it up at your preferred local pharmacy **  What is oral/dental rinse?   It is a mouthwash. It is a way of cleaning the mouth with a germ killing solution before your surgery.  The solution contains chlorhexidine, commonly known as CHG.   It is used inside the mouth to kill a bacteria known as Staphylococcus aureus.  Let your doctor know if you are allergic to Chlorhexidine.    Why do I need to use CHG oral/dental rinse?  The CHG oral/dental rinse helps to kill a bacteria in your mouth known a Staphylococcus aureus.     This reduces the risk of infection at the surgical site.      Using your CHG oral/dental rinse  STEPS:  Use your CHG oral/dental rinse after you brush your teeth the night before (at bedtime) and the morning of your surgery.  Follow all directions on your prescription label.    Use the cap on the container to measure 15ml (fill cap to fill  line)  Swish (gargle if you can) the mouthwash in your mouth for at least 30 seconds, (do not to swallow) spit out  After you use your CHG rinse, do not rinse your mouth with water, drink or eat.  Please refer to prescription label for the appropriate time to resume oral intake  Dental rinse comes in one size bottle: 473ml ~16oz.  You will have leftover    rinse, discard after this use.    What side effects might I have using the CHG oral/dental rinse?  CHG rinse will stick to plaque on the teeth.  Brush and floss just before use.  Teeth brushing will help avoid staining of plaque during use.    Who should I contact if I have questions about the CHG oral/dental rinse?  Please call [facility], Preadmission Testing at [phone] if you have any questions

## 2025-05-29 ENCOUNTER — TELEPHONE (OUTPATIENT)
Dept: CARDIOLOGY | Facility: CLINIC | Age: 45
End: 2025-05-29
Payer: COMMERCIAL

## 2025-05-29 DIAGNOSIS — I82.A11 ACUTE DEEP VEIN THROMBOSIS (DVT) OF AXILLARY VEIN OF RIGHT UPPER EXTREMITY: ICD-10-CM

## 2025-05-29 LAB
ATRIAL RATE: 68 BPM
ERYTHROCYTE [SEDIMENTATION RATE] IN BLOOD BY WESTERGREN METHOD: 2 MM/H (ref 0–15)
P AXIS: 28 DEGREES
P OFFSET: 197 MS
P ONSET: 138 MS
PR INTERVAL: 156 MS
Q ONSET: 216 MS
QRS COUNT: 11 BEATS
QRS DURATION: 90 MS
QT INTERVAL: 396 MS
QTC CALCULATION(BAZETT): 421 MS
QTC FREDERICIA: 413 MS
R AXIS: 9 DEGREES
T AXIS: 26 DEGREES
T OFFSET: 414 MS
VENTRICULAR RATE: 68 BPM

## 2025-05-29 RX ORDER — ENOXAPARIN SODIUM 100 MG/ML
90 INJECTION SUBCUTANEOUS EVERY 12 HOURS
Qty: 6 ML | Refills: 0 | Status: CANCELLED | OUTPATIENT
Start: 2025-05-29

## 2025-05-29 NOTE — TELEPHONE ENCOUNTER
Traci willson [facility] called and left message on 5/28/25 at 4:45 pm, they would like to know if 100mg syringe for the medication Lovenox 80mg/0.8 would be better as the directions states 90mg 2x daily. There is a line on the 80mg syringe but it could be easier for the patient if it was 100mg syringe instead.   Would this be acceptable to you?     Please advise.

## 2025-05-30 ENCOUNTER — TELEPHONE (OUTPATIENT)
Dept: PREADMISSION TESTING | Facility: HOSPITAL | Age: 45
End: 2025-05-30

## 2025-05-30 LAB — STAPHYLOCOCCUS SPEC CULT: NORMAL

## 2025-05-31 DIAGNOSIS — I82.A11 ACUTE DEEP VEIN THROMBOSIS (DVT) OF AXILLARY VEIN OF RIGHT UPPER EXTREMITY: ICD-10-CM

## 2025-05-31 RX ORDER — ENOXAPARIN SODIUM 100 MG/ML
90 INJECTION SUBCUTANEOUS EVERY 12 HOURS
Qty: 5 EACH | Refills: 0 | Status: SHIPPED | OUTPATIENT
Start: 2025-05-31

## 2025-06-02 ENCOUNTER — ANESTHESIA EVENT (OUTPATIENT)
Dept: OPERATING ROOM | Facility: HOSPITAL | Age: 45
End: 2025-06-02
Payer: COMMERCIAL

## 2025-06-03 ENCOUNTER — ANESTHESIA (OUTPATIENT)
Dept: OPERATING ROOM | Facility: HOSPITAL | Age: 45
End: 2025-06-03
Payer: COMMERCIAL

## 2025-06-03 ENCOUNTER — HOSPITAL ENCOUNTER (OUTPATIENT)
Facility: HOSPITAL | Age: 45
Discharge: HOME | End: 2025-06-04
Attending: ORTHOPAEDIC SURGERY | Admitting: ORTHOPAEDIC SURGERY
Payer: COMMERCIAL

## 2025-06-03 ENCOUNTER — PATIENT OUTREACH (OUTPATIENT)
Dept: CARDIOLOGY | Facility: CLINIC | Age: 45
End: 2025-06-03

## 2025-06-03 ENCOUNTER — APPOINTMENT (OUTPATIENT)
Dept: RADIOLOGY | Facility: HOSPITAL | Age: 45
End: 2025-06-03
Payer: COMMERCIAL

## 2025-06-03 DIAGNOSIS — Z96.612 INFECTION ASSOCIATED WITH PROSTHESIS OF LEFT SHOULDER JOINT: ICD-10-CM

## 2025-06-03 DIAGNOSIS — Z86.19 HISTORY OF REMOVAL OF JOINT PROSTHESIS OF LEFT SHOULDER DUE TO INFECTION: ICD-10-CM

## 2025-06-03 DIAGNOSIS — Z98.890 HISTORY OF REMOVAL OF JOINT PROSTHESIS OF LEFT SHOULDER DUE TO INFECTION: ICD-10-CM

## 2025-06-03 DIAGNOSIS — T84.59XA INFECTION ASSOCIATED WITH PROSTHESIS OF LEFT SHOULDER JOINT: ICD-10-CM

## 2025-06-03 PROBLEM — R11.2 PONV (POSTOPERATIVE NAUSEA AND VOMITING): Status: ACTIVE | Noted: 2025-06-03

## 2025-06-03 PROCEDURE — 7100000002 HC RECOVERY ROOM TIME - EACH INCREMENTAL 1 MINUTE: Performed by: ORTHOPAEDIC SURGERY

## 2025-06-03 PROCEDURE — 2500000004 HC RX 250 GENERAL PHARMACY W/ HCPCS (ALT 636 FOR OP/ED): Performed by: PHYSICIAN ASSISTANT

## 2025-06-03 PROCEDURE — 23474 REVIS RECONST SHOULDER JOINT: CPT | Performed by: PHYSICIAN ASSISTANT

## 2025-06-03 PROCEDURE — C1713 ANCHOR/SCREW BN/BN,TIS/BN: HCPCS | Performed by: ORTHOPAEDIC SURGERY

## 2025-06-03 PROCEDURE — 2500000004 HC RX 250 GENERAL PHARMACY W/ HCPCS (ALT 636 FOR OP/ED): Performed by: ORTHOPAEDIC SURGERY

## 2025-06-03 PROCEDURE — 73030 X-RAY EXAM OF SHOULDER: CPT | Mod: LEFT SIDE | Performed by: RADIOLOGY

## 2025-06-03 PROCEDURE — 7100000011 HC EXTENDED STAY RECOVERY HOURLY - NURSING UNIT

## 2025-06-03 PROCEDURE — 2500000004 HC RX 250 GENERAL PHARMACY W/ HCPCS (ALT 636 FOR OP/ED): Performed by: STUDENT IN AN ORGANIZED HEALTH CARE EDUCATION/TRAINING PROGRAM

## 2025-06-03 PROCEDURE — 3700000001 HC GENERAL ANESTHESIA TIME - INITIAL BASE CHARGE: Performed by: ORTHOPAEDIC SURGERY

## 2025-06-03 PROCEDURE — 2500000005 HC RX 250 GENERAL PHARMACY W/O HCPCS: Performed by: ANESTHESIOLOGIST ASSISTANT

## 2025-06-03 PROCEDURE — A23474: Performed by: ANESTHESIOLOGIST ASSISTANT

## 2025-06-03 PROCEDURE — A6213 FOAM DRG >16<=48 SQ IN W/BDR: HCPCS | Performed by: ORTHOPAEDIC SURGERY

## 2025-06-03 PROCEDURE — 73020 X-RAY EXAM OF SHOULDER: CPT | Mod: LEFT SIDE | Performed by: RADIOLOGY

## 2025-06-03 PROCEDURE — 23474 REVIS RECONST SHOULDER JOINT: CPT | Performed by: ORTHOPAEDIC SURGERY

## 2025-06-03 PROCEDURE — 7100000001 HC RECOVERY ROOM TIME - INITIAL BASE CHARGE: Performed by: ORTHOPAEDIC SURGERY

## 2025-06-03 PROCEDURE — 64415 NJX AA&/STRD BRCH PLXS IMG: CPT | Performed by: STUDENT IN AN ORGANIZED HEALTH CARE EDUCATION/TRAINING PROGRAM

## 2025-06-03 PROCEDURE — 73030 X-RAY EXAM OF SHOULDER: CPT | Mod: LT

## 2025-06-03 PROCEDURE — C1776 JOINT DEVICE (IMPLANTABLE): HCPCS | Performed by: ORTHOPAEDIC SURGERY

## 2025-06-03 PROCEDURE — 2780000003 HC OR 278 NO HCPCS: Performed by: ORTHOPAEDIC SURGERY

## 2025-06-03 PROCEDURE — 87070 CULTURE OTHR SPECIMN AEROBIC: CPT | Mod: AHULAB | Performed by: ORTHOPAEDIC SURGERY

## 2025-06-03 PROCEDURE — 73020 X-RAY EXAM OF SHOULDER: CPT | Mod: LT

## 2025-06-03 PROCEDURE — 3600000005 HC OR TIME - INITIAL BASE CHARGE - PROCEDURE LEVEL FIVE: Performed by: ORTHOPAEDIC SURGERY

## 2025-06-03 PROCEDURE — A23474: Performed by: STUDENT IN AN ORGANIZED HEALTH CARE EDUCATION/TRAINING PROGRAM

## 2025-06-03 PROCEDURE — 3600000010 HC OR TIME - EACH INCREMENTAL 1 MINUTE - PROCEDURE LEVEL FIVE: Performed by: ORTHOPAEDIC SURGERY

## 2025-06-03 PROCEDURE — 87077 CULTURE AEROBIC IDENTIFY: CPT | Mod: AHULAB | Performed by: ORTHOPAEDIC SURGERY

## 2025-06-03 PROCEDURE — 2500000004 HC RX 250 GENERAL PHARMACY W/ HCPCS (ALT 636 FOR OP/ED): Performed by: ANESTHESIOLOGIST ASSISTANT

## 2025-06-03 PROCEDURE — 9420000001 HC RT PATIENT EDUCATION 5 MIN

## 2025-06-03 PROCEDURE — 2500000002 HC RX 250 W HCPCS SELF ADMINISTERED DRUGS (ALT 637 FOR MEDICARE OP, ALT 636 FOR OP/ED): Performed by: STUDENT IN AN ORGANIZED HEALTH CARE EDUCATION/TRAINING PROGRAM

## 2025-06-03 PROCEDURE — 3700000002 HC GENERAL ANESTHESIA TIME - EACH INCREMENTAL 1 MINUTE: Performed by: ORTHOPAEDIC SURGERY

## 2025-06-03 PROCEDURE — 2720000007 HC OR 272 NO HCPCS: Performed by: ORTHOPAEDIC SURGERY

## 2025-06-03 PROCEDURE — 2500000001 HC RX 250 WO HCPCS SELF ADMINISTERED DRUGS (ALT 637 FOR MEDICARE OP): Performed by: PHYSICIAN ASSISTANT

## 2025-06-03 DEVICE — IMPLANTABLE DEVICE
Type: IMPLANTABLE DEVICE | Site: SHOULDER | Status: FUNCTIONAL
Brand: COMPREHENSIVE REVERSE SHOULDER

## 2025-06-03 DEVICE — IMPLANTABLE DEVICE
Type: IMPLANTABLE DEVICE | Site: SHOULDER | Status: FUNCTIONAL
Brand: COMPREHENSIVE® REVERSE SHOULDER

## 2025-06-03 DEVICE — IMPLANTABLE DEVICE
Type: IMPLANTABLE DEVICE | Site: SHOULDER | Status: FUNCTIONAL
Brand: COMPREHENSIVE® PROLONG®

## 2025-06-03 RX ORDER — LABETALOL HYDROCHLORIDE 5 MG/ML
5 INJECTION, SOLUTION INTRAVENOUS ONCE AS NEEDED
Status: DISCONTINUED | OUTPATIENT
Start: 2025-06-03 | End: 2025-06-03 | Stop reason: HOSPADM

## 2025-06-03 RX ORDER — LIDOCAINE HYDROCHLORIDE 40 MG/ML
SOLUTION TOPICAL AS NEEDED
Status: DISCONTINUED | OUTPATIENT
Start: 2025-06-03 | End: 2025-06-03

## 2025-06-03 RX ORDER — MIDAZOLAM HYDROCHLORIDE 1 MG/ML
INJECTION, SOLUTION INTRAMUSCULAR; INTRAVENOUS
Status: COMPLETED | OUTPATIENT
Start: 2025-06-03 | End: 2025-06-03

## 2025-06-03 RX ORDER — AMOXICILLIN 250 MG
2 CAPSULE ORAL 2 TIMES DAILY
Status: DISCONTINUED | OUTPATIENT
Start: 2025-06-03 | End: 2025-06-04 | Stop reason: HOSPADM

## 2025-06-03 RX ORDER — ONDANSETRON HYDROCHLORIDE 2 MG/ML
4 INJECTION, SOLUTION INTRAVENOUS EVERY 8 HOURS PRN
Status: DISCONTINUED | OUTPATIENT
Start: 2025-06-03 | End: 2025-06-04 | Stop reason: HOSPADM

## 2025-06-03 RX ORDER — SODIUM CHLORIDE, SODIUM LACTATE, POTASSIUM CHLORIDE, CALCIUM CHLORIDE 600; 310; 30; 20 MG/100ML; MG/100ML; MG/100ML; MG/100ML
100 INJECTION, SOLUTION INTRAVENOUS CONTINUOUS
Status: ACTIVE | OUTPATIENT
Start: 2025-06-03 | End: 2025-06-04

## 2025-06-03 RX ORDER — FENTANYL CITRATE 50 UG/ML
INJECTION, SOLUTION INTRAMUSCULAR; INTRAVENOUS AS NEEDED
Status: DISCONTINUED | OUTPATIENT
Start: 2025-06-03 | End: 2025-06-03

## 2025-06-03 RX ORDER — ENOXAPARIN SODIUM 100 MG/ML
90 INJECTION SUBCUTANEOUS EVERY 12 HOURS
Status: DISCONTINUED | OUTPATIENT
Start: 2025-06-04 | End: 2025-06-03

## 2025-06-03 RX ORDER — ONDANSETRON HYDROCHLORIDE 2 MG/ML
INJECTION, SOLUTION INTRAVENOUS AS NEEDED
Status: DISCONTINUED | OUTPATIENT
Start: 2025-06-03 | End: 2025-06-03

## 2025-06-03 RX ORDER — CEFAZOLIN SODIUM 2 G/50ML
2 SOLUTION INTRAVENOUS EVERY 8 HOURS
Status: COMPLETED | OUTPATIENT
Start: 2025-06-03 | End: 2025-06-04

## 2025-06-03 RX ORDER — ONDANSETRON 4 MG/1
4 TABLET, ORALLY DISINTEGRATING ORAL EVERY 8 HOURS PRN
Status: DISCONTINUED | OUTPATIENT
Start: 2025-06-03 | End: 2025-06-04 | Stop reason: HOSPADM

## 2025-06-03 RX ORDER — DIPHENHYDRAMINE HYDROCHLORIDE 50 MG/ML
12.5 INJECTION, SOLUTION INTRAMUSCULAR; INTRAVENOUS ONCE AS NEEDED
Status: DISCONTINUED | OUTPATIENT
Start: 2025-06-03 | End: 2025-06-03 | Stop reason: HOSPADM

## 2025-06-03 RX ORDER — NALOXONE HYDROCHLORIDE 0.4 MG/ML
0.2 INJECTION, SOLUTION INTRAMUSCULAR; INTRAVENOUS; SUBCUTANEOUS EVERY 5 MIN PRN
Status: DISCONTINUED | OUTPATIENT
Start: 2025-06-03 | End: 2025-06-04 | Stop reason: HOSPADM

## 2025-06-03 RX ORDER — OXYCODONE HYDROCHLORIDE 5 MG/1
5 TABLET ORAL EVERY 4 HOURS PRN
Status: DISCONTINUED | OUTPATIENT
Start: 2025-06-03 | End: 2025-06-03

## 2025-06-03 RX ORDER — APREPITANT 40 MG/1
40 CAPSULE ORAL ONCE
Status: COMPLETED | OUTPATIENT
Start: 2025-06-03 | End: 2025-06-03

## 2025-06-03 RX ORDER — ROPIVACAINE HYDROCHLORIDE 5 MG/ML
INJECTION, SOLUTION EPIDURAL; INFILTRATION; PERINEURAL
Status: COMPLETED | OUTPATIENT
Start: 2025-06-03 | End: 2025-06-03

## 2025-06-03 RX ORDER — NORETHINDRONE AND ETHINYL ESTRADIOL 0.5-0.035
KIT ORAL CONTINUOUS PRN
Status: DISCONTINUED | OUTPATIENT
Start: 2025-06-03 | End: 2025-06-03

## 2025-06-03 RX ORDER — SODIUM CHLORIDE, SODIUM LACTATE, POTASSIUM CHLORIDE, CALCIUM CHLORIDE 600; 310; 30; 20 MG/100ML; MG/100ML; MG/100ML; MG/100ML
50 INJECTION, SOLUTION INTRAVENOUS CONTINUOUS
Status: ACTIVE | OUTPATIENT
Start: 2025-06-03 | End: 2025-06-04

## 2025-06-03 RX ORDER — ACETAMINOPHEN 325 MG/1
650 TABLET ORAL EVERY 6 HOURS PRN
Status: DISCONTINUED | OUTPATIENT
Start: 2025-06-03 | End: 2025-06-04 | Stop reason: HOSPADM

## 2025-06-03 RX ORDER — ROCURONIUM BROMIDE 10 MG/ML
INJECTION, SOLUTION INTRAVENOUS AS NEEDED
Status: DISCONTINUED | OUTPATIENT
Start: 2025-06-03 | End: 2025-06-03

## 2025-06-03 RX ORDER — SODIUM CHLORIDE, SODIUM LACTATE, POTASSIUM CHLORIDE, CALCIUM CHLORIDE 600; 310; 30; 20 MG/100ML; MG/100ML; MG/100ML; MG/100ML
100 INJECTION, SOLUTION INTRAVENOUS CONTINUOUS
Status: DISCONTINUED | OUTPATIENT
Start: 2025-06-03 | End: 2025-06-03 | Stop reason: HOSPADM

## 2025-06-03 RX ORDER — PROPOFOL 10 MG/ML
INJECTION, EMULSION INTRAVENOUS AS NEEDED
Status: DISCONTINUED | OUTPATIENT
Start: 2025-06-03 | End: 2025-06-03

## 2025-06-03 RX ORDER — LIDOCAINE HYDROCHLORIDE 10 MG/ML
0.1 INJECTION, SOLUTION EPIDURAL; INFILTRATION; INTRACAUDAL; PERINEURAL ONCE
Status: DISCONTINUED | OUTPATIENT
Start: 2025-06-03 | End: 2025-06-03 | Stop reason: HOSPADM

## 2025-06-03 RX ORDER — TRANEXAMIC ACID 1 G/10ML
INJECTION, SOLUTION INTRAVENOUS AS NEEDED
Status: DISCONTINUED | OUTPATIENT
Start: 2025-06-03 | End: 2025-06-03

## 2025-06-03 RX ORDER — CEFAZOLIN 1 G/1
INJECTION, POWDER, FOR SOLUTION INTRAVENOUS AS NEEDED
Status: DISCONTINUED | OUTPATIENT
Start: 2025-06-03 | End: 2025-06-03

## 2025-06-03 RX ORDER — FENTANYL CITRATE 50 UG/ML
INJECTION, SOLUTION INTRAMUSCULAR; INTRAVENOUS
Status: COMPLETED | OUTPATIENT
Start: 2025-06-03 | End: 2025-06-03

## 2025-06-03 RX ORDER — CEFAZOLIN SODIUM 2 G/50ML
2 SOLUTION INTRAVENOUS EVERY 8 HOURS
Status: CANCELLED | OUTPATIENT
Start: 2025-06-03

## 2025-06-03 RX ORDER — HYDROCODONE BITARTRATE AND ACETAMINOPHEN 5; 325 MG/1; MG/1
2 TABLET ORAL EVERY 4 HOURS PRN
Refills: 0 | Status: DISCONTINUED | OUTPATIENT
Start: 2025-06-03 | End: 2025-06-04 | Stop reason: HOSPADM

## 2025-06-03 RX ORDER — LIDOCAINE HYDROCHLORIDE 20 MG/ML
INJECTION, SOLUTION INFILTRATION; PERINEURAL AS NEEDED
Status: DISCONTINUED | OUTPATIENT
Start: 2025-06-03 | End: 2025-06-03

## 2025-06-03 RX ORDER — HYDROCODONE BITARTRATE AND ACETAMINOPHEN 5; 325 MG/1; MG/1
1 TABLET ORAL EVERY 4 HOURS PRN
Refills: 0 | Status: DISCONTINUED | OUTPATIENT
Start: 2025-06-03 | End: 2025-06-04 | Stop reason: HOSPADM

## 2025-06-03 RX ORDER — ONDANSETRON HYDROCHLORIDE 2 MG/ML
4 INJECTION, SOLUTION INTRAVENOUS ONCE AS NEEDED
Status: DISCONTINUED | OUTPATIENT
Start: 2025-06-03 | End: 2025-06-03 | Stop reason: HOSPADM

## 2025-06-03 RX ADMIN — LIDOCAINE HYDROCHLORIDE 4 ML: 40 SOLUTION TOPICAL at 08:39

## 2025-06-03 RX ADMIN — CEFAZOLIN 2 G: 1 INJECTION, POWDER, FOR SOLUTION INTRAMUSCULAR; INTRAVENOUS at 08:45

## 2025-06-03 RX ADMIN — ROCURONIUM BROMIDE 60 MG: 10 INJECTION INTRAVENOUS at 08:35

## 2025-06-03 RX ADMIN — ROPIVACAINE HYDROCHLORIDE 20 ML: 5 INJECTION, SOLUTION EPIDURAL; INFILTRATION; PERINEURAL at 07:15

## 2025-06-03 RX ADMIN — FENTANYL CITRATE 25 MCG: 50 INJECTION, SOLUTION INTRAMUSCULAR; INTRAVENOUS at 10:22

## 2025-06-03 RX ADMIN — CARBOXYMETHYLCELLULOSE SODIUM 4 DROP: 0.5 SOLUTION/ DROPS OPHTHALMIC at 08:35

## 2025-06-03 RX ADMIN — LIDOCAINE HYDROCHLORIDE 100 MG: 20 INJECTION, SOLUTION INFILTRATION; PERINEURAL at 08:35

## 2025-06-03 RX ADMIN — MIDAZOLAM HYDROCHLORIDE 2 MG: 1 INJECTION, SOLUTION INTRAMUSCULAR; INTRAVENOUS at 07:15

## 2025-06-03 RX ADMIN — APREPITANT 40 MG: 40 CAPSULE ORAL at 08:25

## 2025-06-03 RX ADMIN — CEFAZOLIN SODIUM 2 G: 2 SOLUTION INTRAVENOUS at 16:27

## 2025-06-03 RX ADMIN — PROPOFOL 200 MG: 10 INJECTION, EMULSION INTRAVENOUS at 08:35

## 2025-06-03 RX ADMIN — DEXAMETHASONE SODIUM PHOSPHATE 8 MG: 4 INJECTION, SOLUTION INTRAMUSCULAR; INTRAVENOUS at 08:35

## 2025-06-03 RX ADMIN — ONDANSETRON 4 MG: 2 INJECTION, SOLUTION INTRAMUSCULAR; INTRAVENOUS at 10:30

## 2025-06-03 RX ADMIN — SODIUM CHLORIDE, POTASSIUM CHLORIDE, SODIUM LACTATE AND CALCIUM CHLORIDE: 600; 310; 30; 20 INJECTION, SOLUTION INTRAVENOUS at 10:37

## 2025-06-03 RX ADMIN — SUGAMMADEX 200 MG: 100 INJECTION, SOLUTION INTRAVENOUS at 10:48

## 2025-06-03 RX ADMIN — TRANEXAMIC ACID 1000 MG: 1 INJECTION, SOLUTION INTRAVENOUS at 08:44

## 2025-06-03 RX ADMIN — SENNOSIDES AND DOCUSATE SODIUM 2 TABLET: 50; 8.6 TABLET ORAL at 16:27

## 2025-06-03 RX ADMIN — FENTANYL CITRATE 50 MCG: 50 INJECTION, SOLUTION INTRAMUSCULAR; INTRAVENOUS at 08:35

## 2025-06-03 RX ADMIN — FENTANYL CITRATE 100 MCG: 50 INJECTION, SOLUTION INTRAMUSCULAR; INTRAVENOUS at 07:15

## 2025-06-03 RX ADMIN — ROCURONIUM BROMIDE 20 MG: 10 INJECTION INTRAVENOUS at 09:37

## 2025-06-03 RX ADMIN — HYDROCODONE BITARTRATE AND ACETAMINOPHEN 1 TABLET: 5; 325 TABLET ORAL at 21:41

## 2025-06-03 RX ADMIN — FENTANYL CITRATE 25 MCG: 50 INJECTION, SOLUTION INTRAMUSCULAR; INTRAVENOUS at 10:34

## 2025-06-03 RX ADMIN — SODIUM CHLORIDE, POTASSIUM CHLORIDE, SODIUM LACTATE AND CALCIUM CHLORIDE: 600; 310; 30; 20 INJECTION, SOLUTION INTRAVENOUS at 08:30

## 2025-06-03 SDOH — SOCIAL STABILITY: SOCIAL INSECURITY: ABUSE: ADULT

## 2025-06-03 SDOH — ECONOMIC STABILITY: HOUSING INSECURITY: IN THE LAST 12 MONTHS, WAS THERE A TIME WHEN YOU WERE NOT ABLE TO PAY THE MORTGAGE OR RENT ON TIME?: NO

## 2025-06-03 SDOH — ECONOMIC STABILITY: HOUSING INSECURITY: AT ANY TIME IN THE PAST 12 MONTHS, WERE YOU HOMELESS OR LIVING IN A SHELTER (INCLUDING NOW)?: NO

## 2025-06-03 SDOH — SOCIAL STABILITY: SOCIAL INSECURITY: WITHIN THE LAST YEAR, HAVE YOU BEEN AFRAID OF YOUR PARTNER OR EX-PARTNER?: NO

## 2025-06-03 SDOH — SOCIAL STABILITY: SOCIAL INSECURITY: WITHIN THE LAST YEAR, HAVE YOU BEEN HUMILIATED OR EMOTIONALLY ABUSED IN OTHER WAYS BY YOUR PARTNER OR EX-PARTNER?: NO

## 2025-06-03 SDOH — ECONOMIC STABILITY: FOOD INSECURITY: WITHIN THE PAST 12 MONTHS, THE FOOD YOU BOUGHT JUST DIDN'T LAST AND YOU DIDN'T HAVE MONEY TO GET MORE.: NEVER TRUE

## 2025-06-03 SDOH — SOCIAL STABILITY: SOCIAL INSECURITY: DO YOU FEEL ANYONE HAS EXPLOITED OR TAKEN ADVANTAGE OF YOU FINANCIALLY OR OF YOUR PERSONAL PROPERTY?: NO

## 2025-06-03 SDOH — ECONOMIC STABILITY: INCOME INSECURITY: IN THE PAST 12 MONTHS HAS THE ELECTRIC, GAS, OIL, OR WATER COMPANY THREATENED TO SHUT OFF SERVICES IN YOUR HOME?: NO

## 2025-06-03 SDOH — SOCIAL STABILITY: SOCIAL INSECURITY: HAS ANYONE EVER THREATENED TO HURT YOUR FAMILY OR YOUR PETS?: NO

## 2025-06-03 SDOH — ECONOMIC STABILITY: FOOD INSECURITY: HOW HARD IS IT FOR YOU TO PAY FOR THE VERY BASICS LIKE FOOD, HOUSING, MEDICAL CARE, AND HEATING?: NOT HARD AT ALL

## 2025-06-03 SDOH — ECONOMIC STABILITY: FOOD INSECURITY: WITHIN THE PAST 12 MONTHS, YOU WORRIED THAT YOUR FOOD WOULD RUN OUT BEFORE YOU GOT THE MONEY TO BUY MORE.: NEVER TRUE

## 2025-06-03 SDOH — SOCIAL STABILITY: SOCIAL INSECURITY: WERE YOU ABLE TO COMPLETE ALL THE BEHAVIORAL HEALTH SCREENINGS?: YES

## 2025-06-03 SDOH — ECONOMIC STABILITY: TRANSPORTATION INSECURITY: IN THE PAST 12 MONTHS, HAS LACK OF TRANSPORTATION KEPT YOU FROM MEDICAL APPOINTMENTS OR FROM GETTING MEDICATIONS?: NO

## 2025-06-03 SDOH — HEALTH STABILITY: MENTAL HEALTH: CURRENT SMOKER: 0

## 2025-06-03 SDOH — SOCIAL STABILITY: SOCIAL INSECURITY: HAVE YOU HAD THOUGHTS OF HARMING ANYONE ELSE?: NO

## 2025-06-03 SDOH — SOCIAL STABILITY: SOCIAL INSECURITY: DO YOU FEEL UNSAFE GOING BACK TO THE PLACE WHERE YOU ARE LIVING?: NO

## 2025-06-03 SDOH — SOCIAL STABILITY: SOCIAL INSECURITY: ARE THERE ANY APPARENT SIGNS OF INJURIES/BEHAVIORS THAT COULD BE RELATED TO ABUSE/NEGLECT?: NO

## 2025-06-03 SDOH — SOCIAL STABILITY: SOCIAL INSECURITY: DOES ANYONE TRY TO KEEP YOU FROM HAVING/CONTACTING OTHER FRIENDS OR DOING THINGS OUTSIDE YOUR HOME?: NO

## 2025-06-03 SDOH — SOCIAL STABILITY: SOCIAL INSECURITY: ARE YOU OR HAVE YOU BEEN THREATENED OR ABUSED PHYSICALLY, EMOTIONALLY, OR SEXUALLY BY ANYONE?: NO

## 2025-06-03 ASSESSMENT — PAIN SCALES - GENERAL
PAINLEVEL_OUTOF10: 4
PAINLEVEL_OUTOF10: 0 - NO PAIN

## 2025-06-03 ASSESSMENT — PAIN - FUNCTIONAL ASSESSMENT
PAIN_FUNCTIONAL_ASSESSMENT: 0-10

## 2025-06-03 ASSESSMENT — ACTIVITIES OF DAILY LIVING (ADL)
DRESSING YOURSELF: INDEPENDENT
JUDGMENT_ADEQUATE_SAFELY_COMPLETE_DAILY_ACTIVITIES: YES
TOILETING: INDEPENDENT
FEEDING YOURSELF: INDEPENDENT
HEARING - LEFT EAR: FUNCTIONAL
HEARING - RIGHT EAR: FUNCTIONAL
GROOMING: INDEPENDENT
LACK_OF_TRANSPORTATION: NO
BATHING: INDEPENDENT
PATIENT'S MEMORY ADEQUATE TO SAFELY COMPLETE DAILY ACTIVITIES?: YES
WALKS IN HOME: INDEPENDENT
LACK_OF_TRANSPORTATION: NO
ADEQUATE_TO_COMPLETE_ADL: YES

## 2025-06-03 ASSESSMENT — COLUMBIA-SUICIDE SEVERITY RATING SCALE - C-SSRS
2. HAVE YOU ACTUALLY HAD ANY THOUGHTS OF KILLING YOURSELF?: NO
6. HAVE YOU EVER DONE ANYTHING, STARTED TO DO ANYTHING, OR PREPARED TO DO ANYTHING TO END YOUR LIFE?: NO
1. IN THE PAST MONTH, HAVE YOU WISHED YOU WERE DEAD OR WISHED YOU COULD GO TO SLEEP AND NOT WAKE UP?: NO

## 2025-06-03 ASSESSMENT — LIFESTYLE VARIABLES
SKIP TO QUESTIONS 9-10: 0
AUDIT-C TOTAL SCORE: 3
AUDIT-C TOTAL SCORE: 3
HOW OFTEN DO YOU HAVE A DRINK CONTAINING ALCOHOL: 2-4 TIMES A MONTH
HOW MANY STANDARD DRINKS CONTAINING ALCOHOL DO YOU HAVE ON A TYPICAL DAY: 1 OR 2
HOW OFTEN DO YOU HAVE 6 OR MORE DRINKS ON ONE OCCASION: LESS THAN MONTHLY

## 2025-06-03 ASSESSMENT — COGNITIVE AND FUNCTIONAL STATUS - GENERAL
DRESSING REGULAR UPPER BODY CLOTHING: A LOT
STANDING UP FROM CHAIR USING ARMS: A LITTLE
HELP NEEDED FOR BATHING: A LITTLE
DRESSING REGULAR LOWER BODY CLOTHING: A LITTLE
MOVING TO AND FROM BED TO CHAIR: A LITTLE
MOBILITY SCORE: 21
TURNING FROM BACK TO SIDE WHILE IN FLAT BAD: A LITTLE
DAILY ACTIVITIY SCORE: 20
PATIENT BASELINE BEDBOUND: NO

## 2025-06-03 ASSESSMENT — PAIN DESCRIPTION - LOCATION: LOCATION: SHOULDER

## 2025-06-03 ASSESSMENT — PAIN DESCRIPTION - ORIENTATION: ORIENTATION: LEFT

## 2025-06-03 NOTE — INTERVAL H&P NOTE
H&P reviewed. The patient was examined and there are no changes to the H&P.    Shaji Stoll MD  Sports Medicine Fellow  Orthopaedic Surgery

## 2025-06-03 NOTE — ANESTHESIA PROCEDURE NOTES
Airway  Date/Time: 6/3/2025 8:39 AM  Reason: elective    Airway not difficult    Staffing  Performed: MIKE   Authorized by: Parish Hunt MD    Performed by: MIKE Purdy  Patient location during procedure: OR    Patient Condition  Indications for airway management: anesthesia and airway protection  Patient position: sniffing  MILS maintained throughout  Planned trial extubation  Sedation level: deep     Final Airway Details   Preoxygenated: yes  Final airway type: endotracheal airway  Successful airway: ETT  Cuffed: yes   Successful intubation technique: video laryngoscopy (Jarvis 3)  Adjuncts used in placement: intubating stylet  Endotracheal tube insertion site: oral  Blade: Colt  Blade size: #3  ETT size (mm): 7.5  Cormack-Lehane Classification: grade I - full view of glottis  Placement verified by: chest auscultation and capnometry   Measured from: lips  ETT to lips (cm): 22  Number of attempts at approach: 1  Number of other approaches attempted: 0    Additional Comments  Easy grade I view. Teeth and lips in pre-op condition. Soft bite block placed.

## 2025-06-03 NOTE — ANESTHESIA PROCEDURE NOTES
Peripheral Block    Patient location during procedure: pre-op  Medication administered at: 6/3/2025 7:15 AM  End time: 6/3/2025 7:18 AM  Reason for block: at surgeon's request and post-op pain management  Staffing  Performed: attending   Authorized by: Parish Hunt MD    Performed by: Parish Hunt MD  Preanesthetic Checklist  Completed: patient identified, IV checked, site marked, risks and benefits discussed, surgical consent, monitors and equipment checked, pre-op evaluation and timeout performed   Timeout performed at:   Peripheral Block  Patient position: laying flat  Prep: ChloraPrep and site prepped and draped  Patient monitoring: continuous pulse ox, heart rate and cardiac monitor  Block type: interscalene  Laterality: left  Injection technique: single-shot  Guidance: ultrasound guided  Local infiltration: lidocaine  Infiltration strength: 1 %  Dose: 3 mL  Needle  Needle type: short-bevel   Needle gauge: 22 G  Needle length: 8 cm  Needle localization: ultrasound guidance  Test dose: negative  Assessment  Injection assessment: negative aspiration for heme, local visualized surrounding nerve on ultrasound, no paresthesia on injection and incremental injection  Heart rate change: no  Slow fractionated injection: yes  Medications Administered  midazolam (VERSED) IV - intravenous   2 mg - 6/3/2025 7:15:00 AM  fentaNYL (SUBLIMAZE) IV - intravenous   100 mcg - 6/3/2025 7:15:00 AM  ropivacaine (NAROPIN) 5 MG/ML Perineural - perineural injection   20 mL - 6/3/2025 7:15:00 AM

## 2025-06-03 NOTE — PROGRESS NOTES
Left Shoulder Revision Arthroplasty with Removal of Antibiotic Spacer   Admitted at time of outreach. Will close TCM program at this time.

## 2025-06-03 NOTE — OP NOTE
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 [T84.59XA, Z96.612]     * History of removal of joint prosthesis of left shoulder due to infection [Z98.890, Z86.19]     Procedures  Left Shoulder Revision Arthroplasty with Removal of Antibiotic Spacer  68144 - DE KT SHOULDER ARTHRPLSTY HUMERAL&GLENOID COMPNT      Surgeons      * Juanito Crain - Primary    Resident/Fellow/Other Assistant:  Surgeons and Role:     * Danita Amaro PA-C - Resident - Assisting    Staff:   Circulator: Homar Bell Person: Fely    Anesthesia Staff: Anesthesiologist: Parish Hunt MD  CRNA: KAREN Recinos-CRNA  C-AA: MIKE Purdy    Procedure Summary  Anesthesia: Anesthesia type not filed in the log.  ASA: II  Estimated Blood Loss: 40-50 mL  Intra-op Medications:   Administrations occurring from 0805 to 1030 on 25:   Medication Name Total Dose   ceFAZolin (Ancef) vial 1 g 2 g   dexAMETHasone (Decadron) 4 mg/mL IV Syringe 2 mL 8 mg   fentaNYL (Sublimaze) injection 50 mcg/mL 50 mcg   lactated Ringer's infusion Cannot be calculated   lidocaine (Xylocaine) 2 % 100 mg   lidocaine (LTA Kit) for intubation 4 mL   lubricating eye drops ophthalmic solution 4 drop   propofol (Diprivan) injection 10 mg/mL 200 mg   rocuronium (ZeMuron) 50 mg/5 mL injection 80 mg   tranexamic acid (Cyklokapron) injection 1,000 mg   aprepitant (Emend) capsule 40 mg 40 mg              Anesthesia Record               Intraprocedure I/O Totals          Intake    Tranexamic Acid 0.00  mL    The total shown is the total volume documented since Anesthesia Start was filed.    lactated Ringer's infusion 700.00 mL    Total Intake 700 mL       Output    Urine 0 mL    Est. Blood Loss 150 mL    Total Output 150 mL       Net    Net Volume 550 mL          Specimen:   ID Type Source Tests Collected by Time   A : LEFT SHOULDER DEEP Swab SHOULDER  ARTHROPLASTY LEFT TISSUE/WOUND CULTURE/SMEAR Juanito Crain MD 6/3/2025 0909   B : LEFT SHOULDER CAPSULE Swab SHOULDER  ARTHROPLASTY LEFT TISSUE/WOUND CULTURE/SMEAR Juanito Crain MD 6/3/2025 0916                 Drains and/or Catheters: * None in log *    Tourniquet Times:         Implants:  Implants       Type Name Action Serial No.      Joint BASEPLATE, AUGMENTED, W/ TAPER ADAPTER, MEDIUM - SN/A - [id number] Implanted N/A     Joint CENTRAL SCREW, 6.5 X 30MM - SN/A - [id number] Implanted N/A     Joint Shoulder COMPREHENSIVE NON LOCKING [id number] SCREW Wasted N/A     Joint Shoulder COMPREHENSIVE NON LOCKING [id number] SCREW Implanted N/A     Screw SCREW, COMP, LOCKING, 3.5 HEX, 4.75 X 30MM - SN/A - [id number] Implanted N/A     Screw SCREW, COMP, LOCKING, 3.5 HEX, 4.75 X 30MM - SN/A - [id number] Implanted N/A     Screw SCREW, HEXALOBE, LOCKING, 4.75 X 15MM - SN/A - [id number] Implanted N/A     Joint GLENOSPHERE, VERSA-DIAL, 36MM, STANDARD - SN/A - [id number] Implanted N/A     Joint HUMERAL. BEARING, 36MM STD PRLNG - SN/A - [id number] Implanted N/A     Joint Shoulder IDENTITY SIZE 10 STANDARD HUMERAL STEM Implanted N/A      IMPLANT RECORD Implanted               Findings: No evidence of infection marked heterotopic ossification circumferentially around the glenoid.  Intact antibiotic spacer.      Indications: Ernesto Thurston is an 45 y.o. 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  Patient had  antibiotic spacer placed for infected shoulder arthroplasty.  He is returning for scheduled conversion with removal of antibiotic spacer and placement of the reverse arthroplasty prosthesis.  Patient is provided informed consent after discussion of potential benefits possible risk alternatives and rehabilitation sequence required after surgery.    The patient was seen in the preoperative area. The risks, benefits, complications, treatment options, non-operative alternatives, expected recovery and outcomes were discussed with the patient. The possibilities of reaction to medication, pulmonary aspiration, injury to surrounding structures, bleeding, recurrent infection, the need for additional procedures, failure to diagnose a condition, and creating a complication requiring transfusion or operation were discussed with the patient. The patient concurred with the proposed plan, giving informed consent.  The site of surgery was properly noted/marked if necessary per policy. The patient has been actively warmed in preoperative area. Preoperative antibiotics have been ordered and given within 1 hours of incision. Venous thrombosis prophylaxis have been ordered including bilateral sequential compression devices    Procedure Details: Patient is transferred to the operating placed supine on the operating table a timeout was called to lew site confirmed patient identification confirmed procedure reviewed allergies reviewed and antibiotics were held.  General endotracheal anesthesia established successfully the patient was moved to seated position all bony prominences were carefully padded sequential compression devices were placed on his legs for DVT prophylaxis the left upper extremity sterilely prepped and draped incision was made with the pre-existing scar brought through skin subcutaneous tissue medial subcutaneous flap was established the deltopectoral interval was identified and opened and the conjoined tendon  identified and retracted upon entering the capsule a small amount of serous fluid was encountered this was sent for culture the humerus was then exposed with mobilization of the deltoid bluntly circumferentially and then the antibiotic spacer removed without difficulty.  The glenoid was then evaluated and noted to be encased in soft tissue and heterotopic bone.  The soft tissue was biopsied and sent for deep capsule culture and antibiotics started.  Then the heterotopic bone inferiorly at the edge of the glenoid rim was meticulously carefully dissected free with combination electrocautery and blunt dissection this exposed this and inferior border of the glenoid and the guidepins were placed on the flatplate x-ray was obtained and a guidepin slightly adjusted for the glenoid exposure the glenoid was reamed incidental note of a small anterior fracture was identified of the posterior superior and inferior borders of the glenoid which still intact the Biomet medium augmented baseplate was selected and then placed using a 30 mm central screw.  Tentative capture was achieved and the fixation was then augmented using 3 locking screws and 1 nonlocking screw superiorly.  Excellent stability was achieved and the comprehensive reverse shoulder glenosphere 36 mm in diameter was then impacted into place with the inferior offset.  The humeral cut was slightly revised and the humerus was broached up to receive a #10 identity humeral stem trial reduction was performed with 2 different sized humeral trays and the extended neutral humeral tray was selected the trial components were removed and the shoulder was copiously irrigated with Irrisept solution and then the size 10 humeral stem with extended humeral tray and 36 mm polyethylene were implanted shoulder was reduced and excellent range of motion stability confirmed the joint was copiously irrigated and the deltopectoral interval closed with observable Vicryl sutures followed by  closure of the subcutaneous tissue and skin with observable sutures as well a sterile dressing was applied the patient was placed in a sling the patient awakened and transferred to the recovery room there were no intraoperative complications.  Evidence of Infection: No   Complications:  None; patient tolerated the procedure well.    Disposition: PACU - hemodynamically stable.  Condition: stable                 Additional Details: None    Attending Attestation:     Juanito Crain  Phone Number: [phone]

## 2025-06-03 NOTE — ANESTHESIA POSTPROCEDURE EVALUATION
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    Anesthesia Post Evaluation    Patient location during evaluation: bedside  Patient participation: complete - patient participated  Level of consciousness: awake  Pain management: adequate  Multimodal analgesia pain management approach  Airway patency: patent  Cardiovascular status: stable  Respiratory status: spontaneous ventilation and unassisted  Hydration status: acceptable  Postoperative Nausea and Vomiting: none  Comments: No significant PONV.      No notable events documented.

## 2025-06-03 NOTE — ANESTHESIA PREPROCEDURE EVALUATION
Patient: Ernesto Thurston    Procedure Information       Date/Time: 06/03/25 0805    Procedure: Left Shoulder Revision Arthroplasty with Removal of Antibiotic Spacer (Left: Shoulder)    Location: [facility] / [facility] OR    Surgeons: Juanito Crain MD            Relevant Problems   Anesthesia   (+) PONV (postoperative nausea and vomiting)      Cardiac   (+) Hyperlipidemia      Pulmonary   (+) Asthma   (+) Single subsegmental pulmonary embolism without acute cor pulmonale      Hematology   (+) Acute deep vein thrombosis (DVT) of axillary vein of right upper extremity   (+) Chronic deep vein thrombosis (DVT) of internal jugular vein (Multi)      ID   (+) 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 IVC Filter. On Eliquis.     03/01/2025 EKG: NSR at 68 bpm      04/2025 prior airway: DL with Mac 4, 7.5 ETT, grade I view.     Clinical information reviewed:   Tobacco  Allergies  Meds   Med Hx  Surg Hx   Fam Hx  Soc Hx        NPO Detail:  NPO/Void Status  Carbohydrate Drink Given Prior to Surgery? : N  Date of Last Liquid: 06/02/25  Time of Last Liquid: 2000  Date of Last Solid: 06/02/25  Time of Last Solid: 1700  Last Intake Type: Clear fluids  Time of Last Void: 0538         Physical Exam    Airway  Mallampati: I  TM distance: >3 FB  Mouth opening: 3 or more finger widths     Cardiovascular - normal exam  Rhythm: regular  Rate: normal     Dental - normal exam  Comments: Age appropriate.      Pulmonary - normal exam   Abdominal - normal exam           Anesthesia Plan    History of general anesthesia?: yes  History of complications of general anesthesia?: no    ASA 2     general and regional     The patient is not a current smoker.  Patient was previously instructed to abstain from smoking on day of procedure.  Patient did not smoke on day of procedure.  Education provided regarding risk of  obstructive sleep apnea.  intravenous induction   Postoperative pain plan includes opioids.  Anesthetic plan and risks discussed with patient.  Use of blood products discussed with patient who consented to blood products.    Plan discussed with CAA.

## 2025-06-03 NOTE — H&P
"History Of Present Illness  Ernesto Thurston is a 45 y.o. male presenting with left shoulder antibiotic spacer for resection of infected prosthesis  .     Past Medical History  He has a past medical history of Agatston coronary artery calcium score less than 100, Anticoagulated on Eliquis, Asthma, Cardiology follow-up encounter, DVT (deep venous thrombosis) (Multi) (03/31/2025), ED (erectile dysfunction), History of removal of joint prosthesis of left shoulder due to infection, Hyperlipidemia, Infection of prosthetic shoulder joint, Mass of skin of left shoulder, Obesity, Osteoarthritis of both hips resulting from hip dysplasia, PONV (postoperative nausea and vomiting), Presence of IVC filter (2005), and Pulmonary embolism.    Surgical History  He has a past surgical history that includes Embolectomy; Total shoulder arthroplasty (Left, 2014); Hernia repair; Vasectomy; Anterior cruciate ligament repair (2005); Meniscectomy (Left, 12/08/2021); Abscess drainage (Left, 02/15/2023); Abscess drainage (Left, 11/13/2024); Wound exploration (09/02/2022); Other surgical history (2005); Shoulder surgery (Left, 03/2025); and Shoulder surgery (Left, 03/04/2025).     Social History  He reports that he has never smoked. He has never used smokeless tobacco. He reports that he does not currently use alcohol. He reports that he does not use drugs.    Family History  Family History[1]     Allergies  Percocet [oxycodone-acetaminophen]    Review of Systems     Physical Exam     Last Recorded Vitals  Blood pressure (!) 144/93, pulse 72, temperature 36.2 °C (97.2 °F), temperature source Temporal, resp. rate 18, height 1.651 m (5' 5\"), weight 91.6 kg (201 lb 15.1 oz), SpO2 96%.    Relevant Results      Scheduled medications  Scheduled Medications[2]  Continuous medications  Continuous Medications[3]  PRN medications  PRN Medications[4]  No results found for this or any previous visit (from the past 24 hours).    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      Juanito Crain MD         [1]   Family History  Problem Relation Name Age of Onset    Dementia Mother          mild    Heart attack Father      Coronary artery disease Father          h/o CABG    Osteoarthritis Sister      Osteoarthritis Sister      Osteoarthritis Brother      Coronary artery disease Paternal Grandfather     [2] [3] [4]

## 2025-06-03 NOTE — CARE PLAN
The patient's goals for the shift include      The clinical goals for the shift include remain comfortable      Problem: Pain - Adult  Goal: Verbalizes/displays adequate comfort level or baseline comfort level  Outcome: Progressing     Problem: Safety - Adult  Goal: Free from fall injury  Outcome: Progressing

## 2025-06-04 ENCOUNTER — PHARMACY VISIT (OUTPATIENT)
Dept: PHARMACY | Facility: CLINIC | Age: 45
End: 2025-06-04
Payer: COMMERCIAL

## 2025-06-04 VITALS
HEART RATE: 73 BPM | HEIGHT: 65 IN | TEMPERATURE: 97.8 F | WEIGHT: 201.94 LBS | DIASTOLIC BLOOD PRESSURE: 76 MMHG | RESPIRATION RATE: 18 BRPM | OXYGEN SATURATION: 96 % | BODY MASS INDEX: 33.65 KG/M2 | SYSTOLIC BLOOD PRESSURE: 115 MMHG

## 2025-06-04 PROCEDURE — 2500000004 HC RX 250 GENERAL PHARMACY W/ HCPCS (ALT 636 FOR OP/ED): Performed by: PHYSICIAN ASSISTANT

## 2025-06-04 PROCEDURE — 2500000001 HC RX 250 WO HCPCS SELF ADMINISTERED DRUGS (ALT 637 FOR MEDICARE OP)

## 2025-06-04 PROCEDURE — 97535 SELF CARE MNGMENT TRAINING: CPT | Mod: GO

## 2025-06-04 PROCEDURE — RXMED WILLOW AMBULATORY MEDICATION CHARGE

## 2025-06-04 PROCEDURE — 2500000001 HC RX 250 WO HCPCS SELF ADMINISTERED DRUGS (ALT 637 FOR MEDICARE OP): Performed by: PHYSICIAN ASSISTANT

## 2025-06-04 PROCEDURE — 97165 OT EVAL LOW COMPLEX 30 MIN: CPT | Mod: GO

## 2025-06-04 PROCEDURE — 7100000011 HC EXTENDED STAY RECOVERY HOURLY - NURSING UNIT

## 2025-06-04 RX ORDER — METHOCARBAMOL 750 MG/1
750 TABLET, FILM COATED ORAL EVERY 6 HOURS PRN
Status: DISCONTINUED | OUTPATIENT
Start: 2025-06-04 | End: 2025-06-04 | Stop reason: HOSPADM

## 2025-06-04 RX ORDER — HYDROMORPHONE HYDROCHLORIDE 2 MG/1
2 TABLET ORAL EVERY 6 HOURS
Qty: 20 TABLET | Refills: 0 | Status: SHIPPED | OUTPATIENT
Start: 2025-06-04 | End: 2025-06-09

## 2025-06-04 RX ORDER — HYDROMORPHONE HYDROCHLORIDE 0.2 MG/ML
0.2 INJECTION INTRAMUSCULAR; INTRAVENOUS; SUBCUTANEOUS EVERY 2 HOUR PRN
Status: DISCONTINUED | OUTPATIENT
Start: 2025-06-04 | End: 2025-06-04 | Stop reason: HOSPADM

## 2025-06-04 RX ORDER — DOCUSATE SODIUM 100 MG/1
100 CAPSULE, LIQUID FILLED ORAL 2 TIMES DAILY
Qty: 14 CAPSULE | Refills: 1 | Status: SHIPPED | OUTPATIENT
Start: 2025-06-04

## 2025-06-04 RX ADMIN — SENNOSIDES AND DOCUSATE SODIUM 2 TABLET: 50; 8.6 TABLET ORAL at 10:13

## 2025-06-04 RX ADMIN — HYDROMORPHONE HYDROCHLORIDE 0.2 MG: 0.2 INJECTION, SOLUTION INTRAMUSCULAR; INTRAVENOUS; SUBCUTANEOUS at 10:13

## 2025-06-04 RX ADMIN — SENNOSIDES AND DOCUSATE SODIUM 2 TABLET: 50; 8.6 TABLET ORAL at 02:03

## 2025-06-04 RX ADMIN — APIXABAN 5 MG: 5 TABLET, FILM COATED ORAL at 10:13

## 2025-06-04 RX ADMIN — HYDROMORPHONE HYDROCHLORIDE 0.2 MG: 0.2 INJECTION, SOLUTION INTRAMUSCULAR; INTRAVENOUS; SUBCUTANEOUS at 14:07

## 2025-06-04 RX ADMIN — CEFAZOLIN SODIUM 2 G: 2 SOLUTION INTRAVENOUS at 01:30

## 2025-06-04 RX ADMIN — METHOCARBAMOL 750 MG: 750 TABLET ORAL at 04:28

## 2025-06-04 RX ADMIN — HYDROCODONE BITARTRATE AND ACETAMINOPHEN 2 TABLET: 5; 325 TABLET ORAL at 06:12

## 2025-06-04 RX ADMIN — HYDROCODONE BITARTRATE AND ACETAMINOPHEN 2 TABLET: 5; 325 TABLET ORAL at 02:02

## 2025-06-04 SDOH — ECONOMIC STABILITY: HOUSING INSECURITY: IN THE LAST 12 MONTHS, WAS THERE A TIME WHEN YOU WERE NOT ABLE TO PAY THE MORTGAGE OR RENT ON TIME?: NO

## 2025-06-04 SDOH — HEALTH STABILITY: MENTAL HEALTH: HOW OFTEN DO YOU HAVE SIX OR MORE DRINKS ON ONE OCCASION?: NEVER

## 2025-06-04 SDOH — ECONOMIC STABILITY: HOUSING INSECURITY: IN THE PAST 12 MONTHS, HOW MANY TIMES HAVE YOU MOVED WHERE YOU WERE LIVING?: 0

## 2025-06-04 SDOH — ECONOMIC STABILITY: FOOD INSECURITY: WITHIN THE PAST 12 MONTHS, YOU WORRIED THAT YOUR FOOD WOULD RUN OUT BEFORE YOU GOT THE MONEY TO BUY MORE.: NEVER TRUE

## 2025-06-04 SDOH — ECONOMIC STABILITY: INCOME INSECURITY: IN THE PAST 12 MONTHS HAS THE ELECTRIC, GAS, OIL, OR WATER COMPANY THREATENED TO SHUT OFF SERVICES IN YOUR HOME?: NO

## 2025-06-04 SDOH — ECONOMIC STABILITY: FOOD INSECURITY: WITHIN THE PAST 12 MONTHS, THE FOOD YOU BOUGHT JUST DIDN'T LAST AND YOU DIDN'T HAVE MONEY TO GET MORE.: NEVER TRUE

## 2025-06-04 SDOH — ECONOMIC STABILITY: HOUSING INSECURITY: AT ANY TIME IN THE PAST 12 MONTHS, WERE YOU HOMELESS OR LIVING IN A SHELTER (INCLUDING NOW)?: NO

## 2025-06-04 SDOH — HEALTH STABILITY: MENTAL HEALTH: HOW OFTEN DO YOU HAVE A DRINK CONTAINING ALCOHOL?: 2-4 TIMES A MONTH

## 2025-06-04 SDOH — HEALTH STABILITY: MENTAL HEALTH: HOW MANY DRINKS CONTAINING ALCOHOL DO YOU HAVE ON A TYPICAL DAY WHEN YOU ARE DRINKING?: 1 OR 2

## 2025-06-04 SDOH — ECONOMIC STABILITY: TRANSPORTATION INSECURITY: IN THE PAST 12 MONTHS, HAS LACK OF TRANSPORTATION KEPT YOU FROM MEDICAL APPOINTMENTS OR FROM GETTING MEDICATIONS?: NO

## 2025-06-04 SDOH — ECONOMIC STABILITY: FOOD INSECURITY: HOW HARD IS IT FOR YOU TO PAY FOR THE VERY BASICS LIKE FOOD, HOUSING, MEDICAL CARE, AND HEATING?: NOT HARD AT ALL

## 2025-06-04 SDOH — SOCIAL STABILITY: SOCIAL INSECURITY: ARE YOU MARRIED, WIDOWED, DIVORCED, SEPARATED, NEVER MARRIED, OR LIVING WITH A PARTNER?: MARRIED

## 2025-06-04 ASSESSMENT — PAIN - FUNCTIONAL ASSESSMENT
PAIN_FUNCTIONAL_ASSESSMENT: 0-10
PAIN_FUNCTIONAL_ASSESSMENT: UNABLE TO SELF-REPORT
PAIN_FUNCTIONAL_ASSESSMENT: 0-10

## 2025-06-04 ASSESSMENT — PAIN SCALES - GENERAL
PAINLEVEL_OUTOF10: 2
PAINLEVEL_OUTOF10: 8
PAINLEVEL_OUTOF10: 7
PAINLEVEL_OUTOF10: 5 - MODERATE PAIN
PAINLEVEL_OUTOF10: 5 - MODERATE PAIN
PAINLEVEL_OUTOF10: 8
PAINLEVEL_OUTOF10: 8
PAINLEVEL_OUTOF10: 4
PAINLEVEL_OUTOF10: 5 - MODERATE PAIN

## 2025-06-04 ASSESSMENT — COGNITIVE AND FUNCTIONAL STATUS - GENERAL
PERSONAL GROOMING: A LITTLE
EATING MEALS: A LITTLE
TOILETING: A LOT
DRESSING REGULAR LOWER BODY CLOTHING: A LOT
DRESSING REGULAR UPPER BODY CLOTHING: A LOT
DAILY ACTIVITIY SCORE: 14
HELP NEEDED FOR BATHING: A LOT

## 2025-06-04 ASSESSMENT — ACTIVITIES OF DAILY LIVING (ADL)
LACK_OF_TRANSPORTATION: NO
HOME_MANAGEMENT_TIME_ENTRY: 10
ADL_ASSISTANCE: INDEPENDENT
LACK_OF_TRANSPORTATION: NO

## 2025-06-04 ASSESSMENT — PAIN DESCRIPTION - ORIENTATION
ORIENTATION: RIGHT
ORIENTATION: LEFT

## 2025-06-04 ASSESSMENT — LIFESTYLE VARIABLES
AUDIT-C TOTAL SCORE: 2
SKIP TO QUESTIONS 9-10: 1

## 2025-06-04 ASSESSMENT — PAIN DESCRIPTION - LOCATION
LOCATION: SHOULDER

## 2025-06-04 NOTE — PROGRESS NOTES
Occupational Therapy    Evaluation/Treatment    Patient Name: Ernesto Thurston  MRN: [id number]  Department: [place] [address]  Room: [facility]  Today's Date: 06/04/25  Time Calculation  Start Time: 1050  Stop Time: 1117  Time Calculation (min): 27 min       Assessment:  OT Assessment: Pt s/p L Shoulder Revision Arthoplasty with NWB LUE precautions and in sling at all times. Pt presenting with increase pain and limited LUE ROM and strength, impeding ADL performance and functional mobility. Pt would benefit from skilled OT services to address these deficits and facilitate highest level of function.  Prognosis: Good  Barriers to Discharge Home: No anticipated barriers  Evaluation/Treatment Tolerance: Patient limited by pain, Patient tolerated treatment well  Medical Staff Made Aware: Yes  End of Session Communication: Bedside nurse  End of Session Patient Position: Bed, 3 rail up, Alarm on  OT Assessment Results: Decreased ADL status, Decreased upper extremity range of motion, Decreased upper extremity strength, Decreased fine motor control, Decreased functional mobility, Decreased IADLs  Prognosis: Good  Barriers to Discharge: None  Evaluation/Treatment Tolerance: Patient limited by pain, Patient tolerated treatment well  Medical Staff Made Aware: Yes  Strengths: Ability to acquire knowledge, Attitude of self, Premorbid level of function, Support and attitude of living partners  Barriers to Participation: Comorbidities  Plan:  Treatment Interventions: ADL retraining, Functional transfer training, UE strengthening/ROM, Patient/family training, Equipment evaluation/education, Fine motor coordination activities, Compensatory technique education, Endurance training  OT Frequency: One time follow up visit  OT Discharge Recommendations: Low intensity level of continued care  OT Recommended Transfer Status: Assist of 1  OT - OK to Discharge: Yes (Per POC)  Treatment Interventions: ADL retraining, Functional transfer training, UE  strengthening/ROM, Patient/family training, Equipment evaluation/education, Fine motor coordination activities, Compensatory technique education, Endurance training    Subjective     OT Visit Info:  OT Received On: 06/04/25  General Visit Info:   General  Referred By: Danita Amaro PA-C  Past Medical History Relevant to Rehab: Medical History[1]  Family/Caregiver Present: Yes  Caregiver Feedback: Wife present and supportive  Prior to Session Communication: Bedside nurse  Patient Position Received: Bed, 3 rail up, Alarm off, caregiver present  Preferred Learning Style: auditory, verbal, visual  General Comment: Pt pleasant and agreeable to OT eval. Reporting had recieved pain meds recently that had assisted with pain control.   Precautions:  UE Weight Bearing Status: Left Non-Weight Bearing (In sling at all times (remove for dressing/sponge bathing))  Medical Precautions: Fall precautions  Post-Surgical Precautions: Left shoulder precautions (No shoulder ROM, distal ROM)        Pain:  Pain Assessment  Pain Assessment: 0-10  0-10 (Numeric) Pain Score: 5 - Moderate pain  Pain Type: Surgical pain  Pain Location: Shoulder  Pain Orientation: Left  Pain Interventions: Repositioned, Ambulation/increased activity  Response to Interventions: Decrease in pain    Objective   Cognition:  Overall Cognitive Status: Within Functional Limits  Orientation Level: Oriented X4  Attention: Within Functional Limits  Memory: Within Funtional Limits           Home Living:  Type of Home: House  Lives With: Spouse  Home Adaptive Equipment: None  Home Layout: Two level, 1/2 bath on main level (Able to sleep on main level with recliner)  Home Access: Stairs to enter without rails  Entrance Stairs-Rails: None  Entrance Stairs-Number of Steps: 2  Bathroom Shower/Tub: Tub/shower unit  Bathroom Toilet: Standard  Bathroom Equipment: None  Prior Function:  Level of [place]: Independent with ADLs and functional transfers, Independent with  homemaking with ambulation  ADL Assistance: Independent  Homemaking Assistance: Independent  Ambulatory Assistance: Independent  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 Able to don with Min A . DId not test LLE.)  LE Dressing Where Assessed: Edge of bed       Activity Tolerance:  Endurance: Tolerates 30 min exercise with multiple rests     Bed Mobility/Transfers: Bed Mobility  Bed Mobility: Yes  Bed Mobility 1  Bed Mobility 1: Supine to sitting, Sitting to supine  Bed Mobility Comments 1: Initial SBA, progressed to MI. Use of HOB controls.    Transfers  Transfer: Yes  Transfer 1  Technique 1: Sit to stand, Stand to sit  Transfer Level of Assistance 1: Distant supervision  Trials/Comments 1: Completed with stand by assist for safety.      Functional Mobility:  Functional Mobility  Functional Mobility Performed: Yes  Functional Mobility 1  Device 1: No device  Assistance 1:  (Stand by Assist)  Comments 1: Pt completed x min-mod household distance functional mobility without devices, SBA d/t some lightheadedness possibly from pain meds.  Sitting Balance:  Static Sitting Balance  Static Sitting-Balance Support: Feet supported  Static Sitting-Level of Assistance: Modified Independent  Static Sitting-Comment/Number of Minutes: EOB  Dynamic Sitting Balance  Dynamic Sitting-Balance Support: Feet supported  Dynamic Sitting-Balance: Forward lean, Reaching for objects, Reaching across midline  Dynamic Sitting-Comments: Stand by assist-MI  Standing Balance:  Static Standing Balance  Static Standing-Level of Assistance: Distant supervision  Dynamic Standing Balance  Dynamic  Standing-Level of Assistance: Distant supervision  Dynamic Standing-Balance: Turning  Splinting:  Location: Edge of bed  Type: LUE sling  Splinting Education: [place], [place], Wear schedule, Precautions  Splinting Comments: Pt educated on NWB LUE status, how to don/doff sling & UB dressing as well as wearing schedule. Pt verbalized understanding.     Vision:Vision - Basic Assessment  Current Vision: No visual deficits     Strength:  Strength Comments: WFL RUE; LUE unable to assess d/t precautions     Perception:  Inattention/Neglect: Appears intact  Coordination:  Movements are Fluid and Coordinated: Yes  Coordination Comment: Appears intact   Hand Function:  Hand Function  Gross Grasp: Functional (RUE)  Coordination: Functional (RUE)  Extremities: RUE   RUE : Within Functional Limits  RUE Strength  RUE Overall Strength: Within Functional Limits - able to perform ADL tasks with strength and LUE   LUE: Exceptions to WFL  LUE Strength  LUE Overall Strength: Due to  precautions    Outcome Measures: [facility] Daily Activity  Putting on and taking off regular lower body clothing: A lot  Bathing (including washing, rinsing, drying): A lot  Putting on and taking off regular upper body clothing: A lot  Toileting, which includes using toilet, bedpan or urinal: A lot  Taking care of personal grooming such as brushing teeth: A little  Eating Meals: A little  Daily Activity - Total Score: 14        Education Documentation  Handouts, taught by Vivien Nguyen OT at 6/4/2025 11:57 AM.  Learner: Patient  Readiness: Acceptance  Method: Explanation, Demonstration, Handout  Response: Verbalizes Understanding, Demonstrated Understanding    Body Mechanics, taught by Vivien Nguyen OT at 6/4/2025 11:57 AM.  Learner: Patient  Readiness: Acceptance  Method: Explanation, Demonstration, Handout  Response: Verbalizes Understanding, Demonstrated Understanding    Precautions, taught by Vivien Nguyen OT at 6/4/2025 11:57 AM.  Learner:  Patient  Readiness: Acceptance  Method: Explanation, Demonstration, Handout  Response: Verbalizes Understanding, Demonstrated Understanding    ADL Training, taught by Vivien Nguyen OT at 6/4/2025 11:57 AM.  Learner: Patient  Readiness: Acceptance  Method: Explanation, Demonstration, Handout  Response: Verbalizes Understanding, Demonstrated Understanding    Education Comments  No comments found.        OP EDUCATION:       Goals:  Encounter Problems       Encounter Problems (Active)       ADLs       Patient will perform UB and LB sponge bathing with stand by assist level of assistance while seated and use of adaptive techniques/equipment.       Start:  06/04/25    Expected End:  06/18/25            Patient with complete upper body dressing with minimal assist  level of assistance donning and doffing all UE clothes with PRN adaptive equipment while seated.       Start:  06/04/25    Expected End:  06/18/25            Patient with complete lower body dressing with stand by assist level of assistance donning and doffing all LE clothes  with PRN adaptive equipment while seated.       Start:  06/04/25    Expected End:  06/18/25            Patient will complete daily grooming tasks with modified independent level of assistance and PRN adaptive equipment while standing.       Start:  06/04/25    Expected End:  06/18/25            Patient will complete toileting including hygiene clothing management/hygiene with modified independence level of assistance and raised toilet seat and grab bars.       Start:  06/04/25    Expected End:  06/18/25               COGNITION/SAFETY       Patient will recall and adhere to LUE weight bearing and /or ROM restrictions with all ADL and functional mobility in order to promote healing and safety with functional tasks       Start:  06/04/25    Expected End:  06/18/25               MOBILITY       Patient will perform Functional mobility x Household distances/Community Distances with modified  independent level of assistance and least restrictive device in order to improve safety and functional mobility.       Start:  06/04/25    Expected End:  06/18/25               TRANSFERS       Patient will complete sit to stand transfer with modified independent level of assistance and least restrictive device in order to improve safety and prepare for out of bed mobility.       Start:  06/04/25    Expected End:  06/18/25                      [1]   Past Medical History:  Diagnosis Date    Agatston coronary artery calcium score less than 100     CT Coronary Artery- 10/30/24: Calcium score of 76 Agatston units.    Anticoagulated on Eliquis     Asthma     Cardiology follow-up encounter     Ashanti Foreman MD LOV 4/7/25    DVT (deep venous thrombosis) (Multi) 03/31/2025    upper extremity DVT    ED (erectile dysfunction)     History of removal of joint prosthesis of left shoulder due to infection     Plan: Left Shoulder Revision Arthroplasty with Removal of Antibiotic Spacer 6/3/25    Hyperlipidemia     Infection of prosthetic shoulder joint     s/p Left Shoulder Open Debridement; Excision of Heterotopic Ossification; Removal of Prosthesis; Insertion of Antibiotic Spacer 3/4/25    Mass of skin of left shoulder     Obesity     Osteoarthritis of both hips resulting from hip dysplasia     PONV (postoperative nausea and vomiting)     Presence of IVC filter 2005    Pulmonary embolism     most recent 3/31/2025; h/o Post-op PE

## 2025-06-04 NOTE — PROGRESS NOTES
06/04/25 1123   Discharge Planning   Living Arrangements Spouse/significant other;Children   Support Systems Children;Spouse/significant other   Assistance Needed none   Type of Residence Private residence   Number of Stairs to Enter Residence 2   Number of Stairs Within Residence 10   Do you have animals or pets at home? Yes   Type of Animals or Pets 2 cats   Who is requesting discharge planning? Provider   Home or Post Acute Services None   Expected Discharge Disposition Home   Does the patient need discharge transport arranged? No   Financial Resource Strain   How hard is it for you to pay for the very basics like food, housing, medical care, and heating? Not hard   Housing Stability   In the last 12 months, was there a time when you were not able to pay the mortgage or rent on time? N   In the past 12 months, how many times have you moved where you were living? 0   At any time in the past 12 months, were you homeless or living in a shelter (including now)? N   Transportation Needs   In the past 12 months, has lack of transportation kept you from medical appointments or from getting medications? no   In the past 12 months, has lack of transportation kept you from meetings, work, or from getting things needed for daily living? No   Stroke Family Assessment   Stroke Family Assessment Needed No   Intensity of Service   Intensity of Service 0-30 min     6/4/25 1123  Met with patient at bedside to discuss discharge planning.  Patient does not have a PCP.  He requests that a PCP be arranged near him.  Appointment made in [place].  Pharmacy is [facility] on [address] in [place].  Patient lives at home with his wife and three sons.  He is independent with ADLs and pedro at baseline.  He does not use any assistive devices for ambulation.  He plans on returning home at discharge.  He denies any HHC, DME, or discharge needs.  He will notify the TCC should any needs arise. ADOD today vs tomorrow pending pain control.  Rosalee  Burgess C[facility]IN [facility]

## 2025-06-04 NOTE — NURSING NOTE
Discharge instructions provided using teach back method. Pt's health related  risk factors discussed with pt. pt educated to look for any worsening sign and symptoms. Pt educated to seek medical attention if experience any medical emergency. Pt aware to follow up with outpatient clinics as scheduled. Home going meds reviewed with pt. Pt verbalized understanding of disposition and discharge instructions. All questions answered to patient's satisfaction and within nursing scope of practice. Pt educated on medication side effect, med education handout given to pt. Pt verbalized understanding of med education. Vitals stable, IV removed.

## 2025-06-04 NOTE — NURSING NOTE
Interdisciplinary team present: NP, PT, NM, CC, SW, Orthopedic Coordinator, and bedside RN.  Pain - high pain levels, taking ordered norco  Nausea - none  Discharge barrier - needs OT eval  Discharge plan - Home  Discharge date/time - possibly today

## 2025-06-04 NOTE — PROGRESS NOTES
Occupational Therapy                 Therapy Communication Note    Patient Name: Ernesto Thurston  MRN: [id number]  Department: [place] [address]  Room: [address] [place] [address]  Today's Date: 6/4/2025     Discipline: Occupational Therapy    Missed Visit: OT Missed Visit: Yes     Missed Visit Reason: Missed Visit Reason: Other (Comment) (RN cleared pt to participate in OT eval. Upon arrival, pt tearful, spouse reporting pt is in significant pain and is awaiting pain medication.)    Missed Time: Attempt    Comment:

## 2025-06-04 NOTE — PROGRESS NOTES
"Ernesto Thurston is a 45 y.o. male on day 0 of admission presenting with History of removal of joint prosthesis of left shoulder due to infection.    Subjective   Patient is laying in the bed more comfortable.  Did have a moderate amount of pain after his nerve block wore off.  Was given IV Dilaudid and has noticed a decrease in his pain.  Is beginning to work with occupational therapy.  Denies any chest pain or shortness of breath.  Going to the bathroom without any issues.       Objective     Physical Exam  Cardiovascular:      Rate and Rhythm: Normal rate.   Pulmonary:      Effort: Pulmonary effort is normal.   Abdominal:      General: Abdomen is flat.   Musculoskeletal:      Comments: Bandages intact without any drainage light sensation is intact.   Skin:     General: Skin is warm.   Neurological:      Mental Status: He is alert.         Last Recorded Vitals  Blood pressure 129/83, pulse 80, temperature 36.7 °C (98 °F), temperature source Temporal, resp. rate 17, height 1.651 m (5' 5\"), weight 91.6 kg (201 lb 15.1 oz), SpO2 96%.  Intake/Output last 3 Shifts:  I/O last 3 completed shifts:  In: 1520 (16.6 mL/kg) [P.O.:400; I.V.:1020 (11.1 mL/kg); IV Piggyback:100]  Out: 951 (10.4 mL/kg) [Urine:800 (0.2 mL/kg/hr); Stool:1; Blood:150]  Weight: 91.6 kg     Relevant Results      Scheduled medications  Scheduled Medications[1]  Continuous medications  Continuous Medications[2]  PRN medications  PRN Medications[3]  No results found for this or any previous visit (from the past 24 hours).                         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  He has restarted his blood thinner we will continue on Eliquis.  We will send him home on oral Dilaudid then " transition him to Vicodin.  He is set to follow-up with our office next week.  We discussed signs and symptoms to present to the emergency room for.      I spent 45 minutes in the professional and overall care of this patient.      Danita Amaro PA-C           [1] apixaban, 5 mg, oral, BID  sennosides-docusate sodium, 2 tablet, oral, BID    [2] lactated Ringer's, 50 mL/hr, Last Rate: Stopped (06/03/25 1216)    [3] PRN medications: acetaminophen, HYDROcodone-acetaminophen, HYDROcodone-acetaminophen, HYDROmorphone, methocarbamol, naloxone, ondansetron ODT **OR** ondansetron

## 2025-06-04 NOTE — CARE PLAN
The patient's goals for the shift include      The clinical goals for the shift include pain management      Problem: Pain - Adult  Goal: Verbalizes/displays adequate comfort level or baseline comfort level  Outcome: Met     Problem: Safety - Adult  Goal: Free from fall injury  Outcome: Met     Problem: Discharge Planning  Goal: Discharge to home or other facility with appropriate resources  Outcome: Met     Problem: Chronic Conditions and Co-morbidities  Goal: Patient's chronic conditions and co-morbidity symptoms are monitored and maintained or improved  Outcome: Met     Problem: Nutrition  Goal: Nutrient intake appropriate for maintaining nutritional needs  Outcome: Met

## 2025-06-04 NOTE — DISCHARGE SUMMARY
Discharge Diagnosis  History of removal of joint prosthesis of left shoulder due to infection    Issues Requiring Follow-Up  Status post left shoulder reverse replacement revision    Test Results Pending At Discharge  Pending Labs       Order Current Status    Tissue/Wound Culture/Smear Preliminary result    Tissue/Wound Culture/Smear Preliminary result            Hospital Course   No complications overnight.  Restarted on Eliquis on 6/4.  Pain is currently being managed with IV Dilaudid and oral Vicodin.    Pertinent Physical Exam At Time of Discharge  Physical Exam  Cardiovascular:      Rate and Rhythm: Normal rate.   Pulmonary:      Effort: Pulmonary effort is normal.   Abdominal:      General: Abdomen is flat.   Skin:     General: Skin is warm.   Neurological:      Mental Status: He is alert.   Psychiatric:         Mood and Affect: Mood normal.         Home Medications     Medication List      START taking these medications     docusate sodium 100 mg capsule; Commonly known as: Colace; Take 1   capsule (100 mg) by mouth 2 times a day.   HYDROmorphone 2 mg tablet; Commonly known as: Dilaudid; Take 1 tablet (2   mg) by mouth every 6 hours for 5 days.     CONTINUE taking these medications     acetaminophen 325 mg tablet; Commonly known as: Tylenol; Take 2 tablets   (650 mg) by mouth every 6 hours if needed for mild pain (1 - 3), fever   (temp greater than 38.0 C) or headaches.   apixaban 5 mg tablet; Commonly known as: Eliquis; Take 1 tablet (5 mg)   by mouth 2 times a day.     STOP taking these medications     chlorhexidine 0.12 % solution; Commonly known as: Peridex   enoxaparin 100 mg/mL syringe; Commonly known as: Lovenox   meloxicam 15 mg tablet; Commonly known as: Mobic       Outpatient Follow-Up  Future Appointments   Date Time Provider Department [place]   6/11/2025  9:00 AM Danita Amaro PA-C [id number]   8/4/2025  9:00 AM Ashanti Foreman MD [id number] [place]       Danita Amaro PA-C

## 2025-06-04 NOTE — PROGRESS NOTES
Pharmacy Medication History     Source of Information: PATIENT'S SPOUSE    Additional concerns with the patient's PTA list.     Notified Provider via Haiku : No    The following updates were made to the Prior to Admission medication list:     Medications ADDED:   N/A  Medications CHANGED:  N/A  Medications REMOVED:   N/A  Medications NOT TAKING:   N/A    Allergy reviewed : Yes    Meds 2 Beds : Yes    Outpatient pharmacy confirmed and updated in chart : Yes    Pharmacy name:  [facility]    The list below reflectives the updated PTA list. Please review each medication in order reconciliation for additional clarification and justification.    Prior to Admission Medications   Prescriptions Last Dose      acetaminophen (Tylenol) 325 mg tablet Past Month      Sig: Take 2 tablets (650 mg) by mouth every 6 hours if needed for mild pain (1 - 3), fever (temp greater than 38.0 C) or headaches.   apixaban (Eliquis) 5 mg tablet 5/30/2025 Evening      Sig: Take 1 tablet (5 mg) by mouth 2 times a day.             enoxaparin (Lovenox) 100 mg/mL syringe 6/2/2025 Morning      Sig: Inject 0.9 mL (90 mg) under the skin every 12 hours.   meloxicam (Mobic) 15 mg tablet Past Month ON HOLD PER MD      Sig: Take 1 tablet (15 mg) by mouth once daily.      Facility-Administered Medications: None       The list below reflectives the updated allergy list. Please review each documented allergy for additional clarification and justification.    Allergies   Allergen Reactions    Percocet [Oxycodone-Acetaminophen] Nausea/vomiting     Brandee OK          06/04/25 at 9:40 AM - [place]

## 2025-06-04 NOTE — PROGRESS NOTES
Physical Therapy                 Therapy Communication Note    Patient Name: Ernesto Thurston  MRN: [id number]  Department: [place] [address]  Room: [address] [place] [address]  Today's Date: 6/4/2025     Discipline: Physical Therapy    Missed Visit:       Missed Visit Reason: Missed Visit Reason:  (Per OT, Pt is independent with all functional mobility and has no acute PT needs. Okay to discharge PT orders.)    Missed Time: Screen    Comment:

## 2025-06-08 LAB
BACTERIA SPEC CULT: ABNORMAL
BACTERIA SPEC CULT: ABNORMAL
GRAM STN SPEC: ABNORMAL

## 2025-06-10 DIAGNOSIS — Z96.619 INFECTION OF PROSTHETIC SHOULDER JOINT, SUBSEQUENT ENCOUNTER: Primary | ICD-10-CM

## 2025-06-10 DIAGNOSIS — T84.59XD INFECTION OF PROSTHETIC SHOULDER JOINT, SUBSEQUENT ENCOUNTER: Primary | ICD-10-CM

## 2025-06-10 RX ORDER — AMOXICILLIN 500 MG/1
500 CAPSULE ORAL 3 TIMES DAILY
Qty: 270 CAPSULE | Refills: 0 | Status: SHIPPED | OUTPATIENT
Start: 2025-06-10 | End: 2025-09-08

## 2025-06-11 ENCOUNTER — OFFICE VISIT (OUTPATIENT)
Dept: ORTHOPEDIC SURGERY | Facility: HOSPITAL | Age: 45
End: 2025-06-11
Payer: COMMERCIAL

## 2025-06-11 ENCOUNTER — PATIENT MESSAGE (OUTPATIENT)
Dept: ORTHOPEDIC SURGERY | Facility: HOSPITAL | Age: 45
End: 2025-06-11

## 2025-06-11 DIAGNOSIS — Z96.612 INFECTION ASSOCIATED WITH PROSTHESIS OF LEFT SHOULDER JOINT: Primary | ICD-10-CM

## 2025-06-11 DIAGNOSIS — T84.59XA INFECTION ASSOCIATED WITH PROSTHESIS OF LEFT SHOULDER JOINT: Primary | ICD-10-CM

## 2025-06-11 LAB
BACTERIA SPEC CULT: ABNORMAL
BACTERIA SPEC CULT: ABNORMAL
GRAM STN SPEC: ABNORMAL

## 2025-06-11 PROCEDURE — 99212 OFFICE O/P EST SF 10 MIN: CPT | Performed by: PHYSICIAN ASSISTANT

## 2025-06-11 PROCEDURE — 99024 POSTOP FOLLOW-UP VISIT: CPT | Performed by: PHYSICIAN ASSISTANT

## 2025-06-11 ASSESSMENT — PAIN SCALES - GENERAL: PAINLEVEL_OUTOF10: 1

## 2025-06-11 ASSESSMENT — PAIN - FUNCTIONAL ASSESSMENT: PAIN_FUNCTIONAL_ASSESSMENT: 0-10

## 2025-06-11 NOTE — PROGRESS NOTES
Ernesto returns to clinic today for first postoperative visit after left shoulder antibiotic spacer removal with reverse total shoulder implants.  He overall is doing well his pain has much improved.  He is stopped his narcotic pain medication.  Denies any fevers or chills, chest pain or shortness of breath.    Examination: Surgical incision is healing well some mild swelling.  Good active deltoid activation.  Light sensation is intact  strength 5/5, strong palpable distal radial pulse.  No active drainage on exam today.    Intraoperative cultures came back positive with a growth for P acnes    Impression: Infected left prosthesis of the shoulder    Plan: He will continue with sling usage coming out of it for hygiene purposes.  He may shower and get his incision wet.  He will avoid any active or passive shoulder range of motion at this time.  Will call to set up formal physical therapy however we will delay him until his next appointment.  In regards to his cultures we did discuss these in detail today.  Our office did reach out to infectious disease.  There is an order for an antibiotic in his chart.  He should reach out to infectious disease if he does not hear from them within the next few days to discuss further management.  Our office will be in communication with them as well.  He will follow-up with our office in 3 weeks and at that time he will have an x-ray of the left shoulder.    Danita Amaro PA-C  Department of Orthopaedic Surgery  [facility]    Dictation performed with the use of voice recognition software. Syntax and grammatical errors may exist.

## 2025-06-25 ENCOUNTER — APPOINTMENT (OUTPATIENT)
Dept: PRIMARY CARE | Facility: CLINIC | Age: 45
End: 2025-06-25
Payer: COMMERCIAL

## 2025-06-25 VITALS
HEIGHT: 65 IN | TEMPERATURE: 99 F | WEIGHT: 207 LBS | SYSTOLIC BLOOD PRESSURE: 126 MMHG | HEART RATE: 64 BPM | RESPIRATION RATE: 20 BRPM | BODY MASS INDEX: 34.49 KG/M2 | DIASTOLIC BLOOD PRESSURE: 84 MMHG

## 2025-06-25 DIAGNOSIS — E66.811 CLASS 1 OBESITY DUE TO EXCESS CALORIES WITHOUT SERIOUS COMORBIDITY WITH BODY MASS INDEX (BMI) OF 34.0 TO 34.9 IN ADULT: ICD-10-CM

## 2025-06-25 DIAGNOSIS — J45.20 MILD INTERMITTENT ASTHMA, UNSPECIFIED WHETHER COMPLICATED (HHS-HCC): ICD-10-CM

## 2025-06-25 DIAGNOSIS — I82.A11 ACUTE DEEP VEIN THROMBOSIS (DVT) OF AXILLARY VEIN OF RIGHT UPPER EXTREMITY: ICD-10-CM

## 2025-06-25 DIAGNOSIS — Z00.00 HEALTHCARE MAINTENANCE: ICD-10-CM

## 2025-06-25 DIAGNOSIS — E66.09 CLASS 1 OBESITY DUE TO EXCESS CALORIES WITHOUT SERIOUS COMORBIDITY WITH BODY MASS INDEX (BMI) OF 34.0 TO 34.9 IN ADULT: ICD-10-CM

## 2025-06-25 DIAGNOSIS — Z12.11 SCREEN FOR COLON CANCER: ICD-10-CM

## 2025-06-25 DIAGNOSIS — Z86.19 HISTORY OF REMOVAL OF JOINT PROSTHESIS OF LEFT SHOULDER DUE TO INFECTION: Primary | ICD-10-CM

## 2025-06-25 DIAGNOSIS — Z98.890 HISTORY OF REMOVAL OF JOINT PROSTHESIS OF LEFT SHOULDER DUE TO INFECTION: Primary | ICD-10-CM

## 2025-06-25 DIAGNOSIS — I26.93 SINGLE SUBSEGMENTAL PULMONARY EMBOLISM WITHOUT ACUTE COR PULMONALE: ICD-10-CM

## 2025-06-25 PROBLEM — Z13.31 SCREENING FOR DEPRESSION: Status: ACTIVE | Noted: 2025-06-25

## 2025-06-25 PROBLEM — E78.89 LIPIDS ABNORMAL: Status: ACTIVE | Noted: 2025-06-25

## 2025-06-25 PROCEDURE — 99204 OFFICE O/P NEW MOD 45 MIN: CPT | Performed by: FAMILY MEDICINE

## 2025-06-25 PROCEDURE — 96127 BRIEF EMOTIONAL/BEHAV ASSMT: CPT | Performed by: FAMILY MEDICINE

## 2025-06-25 ASSESSMENT — ENCOUNTER SYMPTOMS
PALPITATIONS: 0
WEAKNESS: 0
DIARRHEA: 0
RHINORRHEA: 0
WHEEZING: 0
BLOOD IN STOOL: 0
NERVOUS/ANXIOUS: 0
VOMITING: 0
SHORTNESS OF BREATH: 0
NUMBNESS: 0
HEMATURIA: 0
COUGH: 0
FEVER: 0
NAUSEA: 0
CONSTIPATION: 0
ARTHRALGIAS: 1
DYSPHORIC MOOD: 0
SORE THROAT: 0

## 2025-06-25 NOTE — ASSESSMENT & PLAN NOTE
PT SEEN AT [facility] 6/3/25-6/4/25   HX  INFECTED LEFT SHOULDER JT REPLACEMENT  Was removed prior , replaced and atbx given  Has f/up with ortho

## 2025-06-25 NOTE — ASSESSMENT & PLAN NOTE
Pt with dvt and PE occurred in march after left shoulder replacement  On apixaban  Has f/up with vasc

## 2025-06-25 NOTE — PROGRESS NOTES
"Subjective   Patient ID: Ernesto Thurston is a 45 y.o. male who presents for Follow-up (Hospital follow up. Angus admit on 6/3/25 after left shoulder replacement. Pt did not have PCP so he is here to establish. Pt see's Victoroff/ortho surg and vascular/cardio (belgica crowell)).    HPI     Review of Systems   Constitutional:  Negative for fever.   HENT:  Negative for congestion, ear pain, rhinorrhea and sore throat.    Respiratory:  Negative for cough, shortness of breath and wheezing.    Cardiovascular:  Negative for chest pain and palpitations.   Gastrointestinal:  Negative for blood in stool, constipation, diarrhea, nausea and vomiting.   Genitourinary:  Negative for hematuria.   Musculoskeletal:  Positive for arthralgias.        Left shoulder   Neurological:  Negative for weakness and numbness.   Psychiatric/Behavioral:  Negative for dysphoric mood and suicidal ideas. The patient is not nervous/anxious.        Objective   /84   Pulse 64   Temp 37.2 °C (99 °F)   Resp 20   Ht 1.651 m (5' 5\")   Wt 93.9 kg (207 lb)   BMI 34.45 kg/m²     Physical Exam  Vitals and nursing note reviewed.   Constitutional:       General: He is not in acute distress.     Appearance: Normal appearance.   HENT:      Head: Normocephalic and atraumatic.   Cardiovascular:      Rate and Rhythm: Normal rate and regular rhythm.      Heart sounds: Normal heart sounds.   Pulmonary:      Effort: Pulmonary effort is normal.      Breath sounds: Normal breath sounds.   Skin:     General: Skin is warm and dry.   Neurological:      General: No focal deficit present.      Mental Status: He is alert.   Psychiatric:         Mood and Affect: Mood normal.         Behavior: Behavior normal.         Assessment/Plan   Problem List Items Addressed This Visit           ICD-10-CM    Asthma J45.909    Pt does not use inhaler, has been stable         Class 1 obesity due to excess calories with body mass index (BMI) of 34.0 to 34.9 in adult E66.811, " E66.09, Z68.34    Advise healthy diet and exercise         Single subsegmental pulmonary embolism without acute cor pulmonale I26.93    Pt with dvt and PE occurred in march after left shoulder replacement  On apixaban  Has f/up with vasc         Acute deep vein thrombosis (DVT) of axillary vein of right upper extremity I82.A11    On apixaban         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

## 2025-07-07 ENCOUNTER — OFFICE VISIT (OUTPATIENT)
Dept: ORTHOPEDIC SURGERY | Facility: HOSPITAL | Age: 45
End: 2025-07-07
Payer: COMMERCIAL

## 2025-07-07 ENCOUNTER — HOSPITAL ENCOUNTER (OUTPATIENT)
Dept: RADIOLOGY | Facility: HOSPITAL | Age: 45
Discharge: HOME | End: 2025-07-07
Payer: COMMERCIAL

## 2025-07-07 ENCOUNTER — DOCUMENTATION (OUTPATIENT)
Dept: PHYSICAL THERAPY | Facility: CLINIC | Age: 45
End: 2025-07-07

## 2025-07-07 DIAGNOSIS — Z96.612 STATUS POST TOTAL SHOULDER ARTHROPLASTY, LEFT: ICD-10-CM

## 2025-07-07 PROCEDURE — 73030 X-RAY EXAM OF SHOULDER: CPT | Mod: LT

## 2025-07-07 PROCEDURE — 73030 X-RAY EXAM OF SHOULDER: CPT | Mod: LEFT SIDE | Performed by: RADIOLOGY

## 2025-07-07 PROCEDURE — 99212 OFFICE O/P EST SF 10 MIN: CPT | Performed by: ORTHOPAEDIC SURGERY

## 2025-07-07 PROCEDURE — 99024 POSTOP FOLLOW-UP VISIT: CPT | Performed by: ORTHOPAEDIC SURGERY

## 2025-07-07 ASSESSMENT — PAIN - FUNCTIONAL ASSESSMENT: PAIN_FUNCTIONAL_ASSESSMENT: NO/DENIES PAIN

## 2025-07-07 NOTE — PROGRESS NOTES
The patient is here for follow-up 1 month status post revision arthroplasty left shoulder he is doing very well with regard to comfort and has been compliant with use of the sling.  His therapist is set up to see him tomorrow.  On exam today his incision is healed well.  Active assisted range of motion elevation 90 external rotation 20 degrees internal rotation to umbilicus no pain on passive motion motor power in abduction 4 -/5.  Light touch sensation of the deltoid intact.     Cultures are positive for P acnes     Patient is started on oral suppression     Generally doing well after left shoulder revision surgery.  Concern for persistent colonization.  Patient will see Dr. Knapp for ongoing advice about suppression.  Continue physical therapy.  Can discontinue the sling for daily activities follow-up in 1 month.     This was dictated using voice recognition software and not corrected for grammatical or spelling errors.

## 2025-07-07 NOTE — PROGRESS NOTES
Physical Therapy    Physical Therapy Evaluation and Treatment      Patient Name: Ernesto Thurston  MRN: [id number]  Today's Date: 7/8/2025    Time Entry:   Time Calculation  Start Time: 1301  Stop Time: 1330  Time Calculation (min): 29 min  PT Evaluation Time Entry  PT Evaluation (Low) Time Entry: 18  PT Therapeutic Procedures Time Entry  Therapeutic Exercise Time Entry: 11                 Insurance:  $20 copay  PA not req  BMN V  Visit: #1  POC: 15      Assessment:  46 y/o M presents to outpatient physical therapy with reports of left shoulder pain 5 weeks s/p revision of rTSA. The patient presents with the current impairments of pain, decreased ROM, weakness of the upper extremity, impaired posture, and impaired scapulothoracic mechanics. These impairments currently limit their ability to reach for objects, lift objects, dress, bathe, and sleep. Due to the limitations listed above, the patient is currently at a decreased functional level compared to baseline, and they would benefit from skilled physical therapy to improve pain intensity, strength, flexibility, postural control, and facilitate a safe and efficient return to functional baseline. Patient's prognosis for improvement with therapy is good at this time.  PT Assessment  PT Assessment Results: Decreased strength, Decreased range of motion, Pain  Rehab Prognosis: Good  Evaluation/Treatment Tolerance: Patient tolerated treatment well     Plan:  OP PT Plan  Treatment/Interventions: Cryotherapy, Education/ Instruction, Hot pack, Manual therapy, Neuromuscular re-education, Self care/ home management, Taping techniques, Therapeutic activities, Therapeutic exercises, Vasopneumatic device  PT Plan: Skilled PT  PT Frequency:  (1-2x/week)  Duration: 14 additional visits  Onset Date: 06/03/25  Certification Period Start Date: 07/08/25  Certification Period End Date: 10/06/25  Rehab Potential: Good  Plan of Care Agreement: Patient      Complexity:  Low     Current  Problem:   1. Acute pain of left shoulder  Referral to Physical Therapy          Subjective    Patient reports to OPPT with complaints of L shoulder pain s/p revision and rTSA. Patient reports that he saw surgeon yesterday who was happy with his progress thus far. They are concerns regarding an infection and he thinks they will give him an antibiotic for an extended period of time. He denies any significant pain since the first week of surgery. Discharged the sling yesterday.     Pain intensity: 0/10 at this time, 5/10 at worst, 0/10 at best    Aggravating factors: movement, sleep, dressing, bathing    Relieving factors: Ice, Tylenol    Patient goals: improve pain, improve function of the shoulder     General:  General  Reason for Referral: L shoulder pain s/p L rTSA revision following infection on 6/3/25  Referred By: Danita Amaro PA-C  Past Medical History Relevant to Rehab: OA, MED, L shoulder infection  Precautions:  Precautions  STEADI Fall Risk Score (The score of 4 or more indicates an increased risk of falling): 0  Precautions Comment: Infection: no DN/US/ES. Progress per protocol (in desk)  Pain:  Pain Assessment  Pain Assessment: 0-10  0-10 (Numeric) Pain Score: 0 - No pain  Pain Type: Surgical pain  Pain Location: Shoulder  Pain Orientation: Left  Pain Descriptors: Sharp, Shooting, Aching, Dull  Prior Level of Function:  Prior Function Per Pt/Caregiver Report  Level of [place]: Independent with ADLs and functional transfers    Objective   Shoulder AROM (*indicates pain):  Flexion R 117, L 95*  Scaption R 99, L 90*  IR R [place] crest, L NT  ER R C3, L NT    Shoulder PROM (*indicates pain):  Flexion R NT, L ~100*  Scaption R NT, L ~90*  IR R NT, L to stomach   ER R NT, L (30 deg abd) 38*    Cervical ROM (*indicates pain):   Flexion 62  Extension 55  RSB 36  LSB 28  Rrot 68  LROT 70    Shoulder strength (*indicates pain):  Flexion R 5/5, L NT/5  Abd  R 5/5, L NT/5  IR  R 5/5, L NT/5  ER R 5/5, L  NT/5    Posture: fwd head, rounded shoulders    Dermatomes: intact    Palpation: TTP along the anterior incision of the arm near the bicipital groove    Observation: Incision appears dry without obvious signs of infection         Outcome Measures:  Other Measures  Disability of Arm Shoulder Hand (DASH): 32     Treatments:  Therapeutic exercise:   PROM L shoulder within tissue, pain, and protocol restrictions     Units: 1    EDUCATION:  Outpatient Education  Individual(s) Educated: Patient  Education Provided: Anatomy, Body Mechanics, Physiology, POC, Post-Op Precautions  Risk and Benefits Discussed with Patient/Caregiver/Other: yes  Patient/Caregiver Demonstrated Understanding: yes  Plan of Care Discussed and Agreed Upon: yes  Patient Response to Education: Patient/Caregiver Verbalized Understanding of Information, Patient/Caregiver Asked Appropriate Questions  HEP held until restrictions confirmed from ortho    Goals:  By discharge:  1. Patient will report and demonstrate independence with established HEP  2. Patient will report pain of 0/10 at rest, and no greater than 2/10 with any activity including reaching, lifting, dressing, and bathing  3. Patient will demonstrate active shoulder flexion to 130 deg, scaption to 120 deg, and ER to C1 to promote improved overall function and approach values present prior to surgery   4. Patient will demonstrate anterior, posterior, and middle deltoid strength >/= 4+/5 to allow for an improved ability to lift, dress, and perform all daily activities  5. Patient will demonstrate a decrease in QuickDash score by 11 points to </=  21/55 to meet established MCID for the outcome measure (baseline 7/8/28 32/55)  6. Patient will report the ability to sleep through the night 6/7 nights per week uninterrupted by pain to improve overall function throughout the day  7. Patient will demonstrate the ability to lift a 3lb weight onto a shelf above shoulder height 10x consecutively and  without pain exceeding 2/10 to indicate an improved ability to peform light lifting tasks within the home  8. Patient will demonstrate active shoulder elevation against gravity >/= 130 deg without an increase in pain to indicate improved ability to perform overhead/shoulder tasks

## 2025-07-07 NOTE — PROGRESS NOTES
Physical Therapy    Discharge Summary    Name: Ernesto Thurston  MRN: [id number]  : 1980  Date: 25    Discharge Summary: PT    Discharge Information: Date of discharge 25, Date of last visit 25, Date of evaluation 3/17/25, Number of attended visits 10, Referred by Danita Amaro PA-C, and Referred for L shoulder pain s/p L shoulder implant removal and antibiotic spacer placement on 3/4/25    Therapy Summary: PT emphasized maintaining/improving P/AROM of the shoulder joint, with gentle return of strength    Discharge Status: Patient is now s/p revision of rTSA and returning to be evaluated post operatively tomorrow.      Rehab Discharge Reason: New evaluation will be performed following second surgery, and case is now discharged from PT.

## 2025-07-08 ENCOUNTER — EVALUATION (OUTPATIENT)
Dept: PHYSICAL THERAPY | Facility: CLINIC | Age: 45
End: 2025-07-08
Payer: COMMERCIAL

## 2025-07-08 DIAGNOSIS — M25.512 ACUTE PAIN OF LEFT SHOULDER: Primary | ICD-10-CM

## 2025-07-08 PROCEDURE — 97110 THERAPEUTIC EXERCISES: CPT | Mod: GP | Performed by: PHYSICAL THERAPIST

## 2025-07-08 PROCEDURE — 97161 PT EVAL LOW COMPLEX 20 MIN: CPT | Mod: GP | Performed by: PHYSICAL THERAPIST

## 2025-07-08 ASSESSMENT — PAIN - FUNCTIONAL ASSESSMENT: PAIN_FUNCTIONAL_ASSESSMENT: 0-10

## 2025-07-08 ASSESSMENT — PAIN SCALES - GENERAL: PAINLEVEL_OUTOF10: 0 - NO PAIN

## 2025-07-08 ASSESSMENT — PAIN DESCRIPTION - DESCRIPTORS: DESCRIPTORS: SHARP;SHOOTING;ACHING;DULL

## 2025-07-10 ENCOUNTER — APPOINTMENT (OUTPATIENT)
Dept: PHYSICAL THERAPY | Facility: CLINIC | Age: 45
End: 2025-07-10
Payer: COMMERCIAL

## 2025-07-14 ENCOUNTER — TREATMENT (OUTPATIENT)
Dept: PHYSICAL THERAPY | Facility: CLINIC | Age: 45
End: 2025-07-14
Payer: COMMERCIAL

## 2025-07-14 DIAGNOSIS — M25.512 ACUTE PAIN OF LEFT SHOULDER: Primary | ICD-10-CM

## 2025-07-14 PROCEDURE — 97110 THERAPEUTIC EXERCISES: CPT | Mod: GP | Performed by: PHYSICAL THERAPIST

## 2025-07-14 ASSESSMENT — PAIN SCALES - GENERAL: PAINLEVEL_OUTOF10: 0 - NO PAIN

## 2025-07-14 ASSESSMENT — PAIN - FUNCTIONAL ASSESSMENT: PAIN_FUNCTIONAL_ASSESSMENT: 0-10

## 2025-07-14 NOTE — PROGRESS NOTES
Physical Therapy    Physical Therapy Treatment    Patient Name: Ernesto Thurston  MRN: [id number]  Today's Date: 7/14/2025    Time Entry:   Time Calculation  Start Time: 1515  Stop Time: 1555  Time Calculation (min): 40 min     PT Therapeutic Procedures Time Entry  Therapeutic Exercise Time Entry: 40                 Insurance:  $20 copay  PA not req  BMN V  Visit: #2  POC: 15    Assessment:  Therapeutic exercises performed target improving strength and flexibility for the left shoulder within pain and protocol restrictions. Patient tolerates activities well, with only mild increase in shoulder discomfort at end range of motion, most apparent in ER. Patient demonstrates improving ROM and strength compared to initial evaluation. Additional skilled physical therapy would benefit the patient to promote additional functional return and help prevent future impairment.  PT Assessment  PT Assessment Results: Decreased strength, Decreased range of motion, Pain  Rehab Prognosis: Good  Plan:  OP PT Plan  PT Plan: Skilled PT  Rehab Potential: Good  Plan of Care Agreement: Patient  Continue to progress per protocol    Current Problem  1. Acute pain of left shoulder            General     General  Reason for Referral: L shoulder pain s/p L rTSA revision following infection on 6/3/25  Referred By: Danita Amaro PA-C  Past Medical History Relevant to Rehab: OA, MED, L shoulder infection    Subjective    Patient reports that his shoulder has been feeling good. Denies pain at the start of session, just some tension in the shoulder.       Precautions  Precautions  STEADI Fall Risk Score (The score of 4 or more indicates an increased risk of falling): 0  Precautions Comment: Infection: no DN/US/ES. Progress per protocol (in desk)  Pain  Pain Assessment  Pain Assessment: 0-10  0-10 (Numeric) Pain Score: 0 - No pain  Pain Type: Surgical pain  Pain Location: Shoulder  Pain Orientation: Left    Objective   Sx 6/3/25: 5 weeks, 6 days post op  "    L shoulder PROM:  Flexion 117*  Scaption 96*  ER (30 deg abd) 31*      Treatments:  Therapeutic exercises:   PROM L shoulder within pain and tissue restrictions   Supine wand flexion x15  Supine wand ER x15  Supine wand HABD/HADD x15  Pulleys flexion/scaption x2' ea  Deltoid isometrics (shoulder flexed for posterior deltoid) 10x6\" ea   Wall ladder flexion x10    Units: 3    OP EDUCATION:     7/14/25:  Exercises  - Supine Shoulder Flexion Extension AAROM with Dowel  - 1 x daily - 7 x weekly - 1 sets - 10 reps - 5sec hold  - Supine Shoulder External Rotation with Dowel  - 1 x daily - 7 x weekly - 1 sets - 15 reps  - Supine Shoulder Press AAROM in Abduction with Dowel  - 1 x daily - 7 x weekly - 1 sets - 15 reps - 5 sec hold      Goals:  By discharge:  1. Patient will report and demonstrate independence with established HEP  2. Patient will report pain of 0/10 at rest, and no greater than 2/10 with any activity including reaching, lifting, dressing, and bathing  3. Patient will demonstrate active shoulder flexion to 130 deg, scaption to 120 deg, and ER to C1 to promote improved overall function and approach values present prior to surgery   4. Patient will demonstrate anterior, posterior, and middle deltoid strength >/= 4+/5 to allow for an improved ability to lift, dress, and perform all daily activities  5. Patient will demonstrate a decrease in QuickDash score by 11 points to </=  21/55 to meet established MCID for the outcome measure (baseline 7/8/28 32/55)  6. Patient will report the ability to sleep through the night 6/7 nights per week uninterrupted by pain to improve overall function throughout the day  7. Patient will demonstrate the ability to lift a 3lb weight onto a shelf above shoulder height 10x consecutively and without pain exceeding 2/10 to indicate an improved ability to peform light lifting tasks within the home  8. Patient will demonstrate active shoulder elevation against gravity >/= 130 deg " without an increase in pain to indicate improved ability to perform overhead/shoulder tasks

## 2025-07-17 ENCOUNTER — TREATMENT (OUTPATIENT)
Dept: PHYSICAL THERAPY | Facility: CLINIC | Age: 45
End: 2025-07-17
Payer: COMMERCIAL

## 2025-07-17 DIAGNOSIS — M25.512 ACUTE PAIN OF LEFT SHOULDER: Primary | ICD-10-CM

## 2025-07-17 PROCEDURE — 97110 THERAPEUTIC EXERCISES: CPT | Mod: GP | Performed by: PHYSICAL THERAPIST

## 2025-07-17 ASSESSMENT — PAIN - FUNCTIONAL ASSESSMENT: PAIN_FUNCTIONAL_ASSESSMENT: 0-10

## 2025-07-17 ASSESSMENT — PAIN SCALES - GENERAL: PAINLEVEL_OUTOF10: 0 - NO PAIN

## 2025-07-17 NOTE — PROGRESS NOTES
Physical Therapy    Physical Therapy Treatment    Patient Name: Ernesto Thurston  MRN: [id number]  Today's Date: 7/17/2025    Time Entry:   Time Calculation  Start Time: 1602  Stop Time: 1641  Time Calculation (min): 39 min     PT Therapeutic Procedures Time Entry  Therapeutic Exercise Time Entry: 39                 Insurance:  $20 copay  PA not req  BMN V  Visit: #3  POC: 15    Assessment:  PT session emphasizes restoring pain free A/PROM of the shoulder. Patient tolerates all treatment well this date, and is able to achieve improved ROM in both flexion and scaption planes this date. Introduction to light strengthening is tolerated without increased pain, but increased muscular fatigue as expected for his given activities. Denies any pain at the end of session. He is progressing well through his post operative protocol, and remains a good candidate for additional skilled PT.   PT Assessment  PT Assessment Results: Decreased strength, Decreased range of motion, Pain  Rehab Prognosis: Good  Plan:  OP PT Plan  PT Plan: Skilled PT  Rehab Potential: Good  Plan of Care Agreement: Patient  Continue to progress per protocol    Current Problem  1. Acute pain of left shoulder              General     General  Reason for Referral: L shoulder pain s/p L rTSA revision following infection on 6/3/25  Referred By: Danita Amaro PA-C  Past Medical History Relevant to Rehab: OA, MED, L shoulder infection    Subjective    Patient denies pain at the start of session. Notes that he felt fatigue after his last appointment, but otherwise has been doing well. Compliant with HEP.       Precautions  Precautions  STEADI Fall Risk Score (The score of 4 or more indicates an increased risk of falling): 0  Precautions Comment: Infection: no DN/US/ES. Progress per protocol (in desk)  Pain  Pain Assessment  Pain Assessment: 0-10  0-10 (Numeric) Pain Score: 0 - No pain  Pain Type: Surgical pain  Pain Location: Shoulder  Pain Orientation:  "Left    Objective   Sx 6/3/25: 6 weeks, 2 days post op     L shoulder PROM:  Flexion 131*  Scaption 112*  ER (30 deg abd) 30*      Treatments:  Therapeutic exercises:   UBE AAROM L1 F/R 2' ea ALT  PROM L shoulder within pain and tissue restrictions   Supine wand flexion x15 NT  Supine wand ER x15 NT  Supine wand HABD/HADD x15 NT  Pulleys flexion/scaption x2' ea  Deltoid isometrics (shoulder flexed for posterior deltoid) 15x6\" ea   Submax ER isometric 15x6\"  Wall ladder flexion/scaption x10    Units: 3    OP EDUCATION:     7/14/25:  Exercises  - Supine Shoulder Flexion Extension AAROM with Dowel  - 1 x daily - 7 x weekly - 1 sets - 10 reps - 5sec hold  - Supine Shoulder External Rotation with Dowel  - 1 x daily - 7 x weekly - 1 sets - 15 reps  - Supine Shoulder Press AAROM in Abduction with Dowel  - 1 x daily - 7 x weekly - 1 sets - 15 reps - 5 sec hold      Goals:  By discharge:  1. Patient will report and demonstrate independence with established HEP  2. Patient will report pain of 0/10 at rest, and no greater than 2/10 with any activity including reaching, lifting, dressing, and bathing  3. Patient will demonstrate active shoulder flexion to 130 deg, scaption to 120 deg, and ER to C1 to promote improved overall function and approach values present prior to surgery   4. Patient will demonstrate anterior, posterior, and middle deltoid strength >/= 4+/5 to allow for an improved ability to lift, dress, and perform all daily activities  5. Patient will demonstrate a decrease in QuickDash score by 11 points to </=  21/55 to meet established MCID for the outcome measure (baseline 7/8/28 32/55)  6. Patient will report the ability to sleep through the night 6/7 nights per week uninterrupted by pain to improve overall function throughout the day  7. Patient will demonstrate the ability to lift a 3lb weight onto a shelf above shoulder height 10x consecutively and without pain exceeding 2/10 to indicate an improved ability to " peform light lifting tasks within the home  8. Patient will demonstrate active shoulder elevation against gravity >/= 130 deg without an increase in pain to indicate improved ability to perform overhead/shoulder tasks

## 2025-07-21 ENCOUNTER — TREATMENT (OUTPATIENT)
Dept: PHYSICAL THERAPY | Facility: CLINIC | Age: 45
End: 2025-07-21
Payer: COMMERCIAL

## 2025-07-21 DIAGNOSIS — M25.512 ACUTE PAIN OF LEFT SHOULDER: Primary | ICD-10-CM

## 2025-07-21 PROCEDURE — 97110 THERAPEUTIC EXERCISES: CPT | Mod: GP | Performed by: PHYSICAL THERAPIST

## 2025-07-21 ASSESSMENT — PAIN SCALES - GENERAL: PAINLEVEL_OUTOF10: 0 - NO PAIN

## 2025-07-21 ASSESSMENT — PAIN - FUNCTIONAL ASSESSMENT: PAIN_FUNCTIONAL_ASSESSMENT: 0-10

## 2025-07-21 NOTE — PROGRESS NOTES
Physical Therapy    Physical Therapy Treatment    Patient Name: Ernesto Thurston  MRN: [id number]  Today's Date: 7/21/2025    Time Entry:   Time Calculation  Start Time: 1602  Stop Time: 1644  Time Calculation (min): 42 min     PT Therapeutic Procedures Time Entry  Therapeutic Exercise Time Entry: 42                 Insurance:  $20 copay  PA not req  BMN V  Visit: #4  POC: 15    Assessment:  Treatment session emphasizes therapeutic exercises targeting improving A/PROM of the shoulder as well as gentle introduction to strengthening of the shoulder. Patient tolerates treatment well, without reports of increased pain during established exercises or new additions. Improvements in PROM is noted in most planes of motion. Continued weakness, pain, and decreased ROM, places the patient at a decreased functional level, and increases risks of future injury/impairment. Additional skilled PT warranted at this time focusing on the above mentioned impairments.   PT Assessment  PT Assessment Results: Decreased strength, Decreased range of motion, Pain  Rehab Prognosis: Good  Plan:  OP PT Plan  PT Plan: Skilled PT  Rehab Potential: Good  Plan of Care Agreement: Patient  Continue to progress per protocol    Current Problem  1. Acute pain of left shoulder                General  PT  Visit  Response to Previous Treatment: Patient with no complaints from previous session., Compliant with home exercise program  General  Reason for Referral: L shoulder pain s/p L rTSA revision following infection on 6/3/25  Referred By: Danita Amaro PA-C  Past Medical History Relevant to Rehab: OA, MED, L shoulder infection    Subjective    Patient reports that the arm is feeling good. Denies pain at this time. Denies any pain after last session, just muscular fatigue.       Precautions  Precautions  STEADI Fall Risk Score (The score of 4 or more indicates an increased risk of falling): 0  Precautions Comment: Infection: no DN/US/ES. Progress per protocol  "(in desk)  Pain  Pain Assessment  Pain Assessment: 0-10  0-10 (Numeric) Pain Score: 0 - No pain  Pain Type: Surgical pain  Pain Location: Shoulder  Pain Orientation: Left    Objective   Sx 6/3/25: 6 weeks, 6 days post op     L shoulder PROM:  Flexion 129*  Scaption 121*  ER (30 deg abd) 39*      Treatments:  Therapeutic exercises:   UBE AAROM L1 F/R 2' ea ALT  PROM L shoulder within pain and tissue restrictions   Supine wand flexion x15 NT  Supine wand ER x15 NT  Supine wand HABD/HADD x15 NT  Pulleys flexion/scaption x2' ea  Deltoid isometrics (shoulder flexed for posterior deltoid) 15x6\" ea NT  LAE RTT 2x10  Tricep pull down (3 way) GTT x15 ea  IR/ER step outs RTT/RTT x15 ea   Submax ER isometric 15x6\"NT  Wall ladder flexion/scaption x10      Units: 3    OP EDUCATION:     7/14/25:  Exercises  - Supine Shoulder Flexion Extension AAROM with Dowel  - 1 x daily - 7 x weekly - 1 sets - 10 reps - 5sec hold  - Supine Shoulder External Rotation with Dowel  - 1 x daily - 7 x weekly - 1 sets - 15 reps  - Supine Shoulder Press AAROM in Abduction with Dowel  - 1 x daily - 7 x weekly - 1 sets - 15 reps - 5 sec hold  7/21/25:    Exercises  - Supine Shoulder Horizontal Abduction Adduction AAROM with Dowel  - 1 x daily - 7 x weekly - 2 sets - 10 reps - 5 sec hold    Goals:  By discharge:  1. Patient will report and demonstrate independence with established HEP  2. Patient will report pain of 0/10 at rest, and no greater than 2/10 with any activity including reaching, lifting, dressing, and bathing  3. Patient will demonstrate active shoulder flexion to 130 deg, scaption to 120 deg, and ER to C1 to promote improved overall function and approach values present prior to surgery   4. Patient will demonstrate anterior, posterior, and middle deltoid strength >/= 4+/5 to allow for an improved ability to lift, dress, and perform all daily activities  5. Patient will demonstrate a decrease in QuickDash score by 11 points to </=  21/55 to " meet established MCID for the outcome measure (baseline 7/8/28 32/55)  6. Patient will report the ability to sleep through the night 6/7 nights per week uninterrupted by pain to improve overall function throughout the day  7. Patient will demonstrate the ability to lift a 3lb weight onto a shelf above shoulder height 10x consecutively and without pain exceeding 2/10 to indicate an improved ability to peform light lifting tasks within the home  8. Patient will demonstrate active shoulder elevation against gravity >/= 130 deg without an increase in pain to indicate improved ability to perform overhead/shoulder tasks

## 2025-07-24 ENCOUNTER — TREATMENT (OUTPATIENT)
Dept: PHYSICAL THERAPY | Facility: CLINIC | Age: 45
End: 2025-07-24
Payer: COMMERCIAL

## 2025-07-24 DIAGNOSIS — M25.512 ACUTE PAIN OF LEFT SHOULDER: Primary | ICD-10-CM

## 2025-07-24 PROCEDURE — 97110 THERAPEUTIC EXERCISES: CPT | Mod: GP | Performed by: PHYSICAL THERAPIST

## 2025-07-24 ASSESSMENT — PAIN - FUNCTIONAL ASSESSMENT: PAIN_FUNCTIONAL_ASSESSMENT: 0-10

## 2025-07-24 ASSESSMENT — PAIN SCALES - GENERAL: PAINLEVEL_OUTOF10: 0 - NO PAIN

## 2025-07-24 NOTE — PROGRESS NOTES
Physical Therapy    Physical Therapy Treatment    Patient Name: Ernesto Thurston  MRN: [id number]  Today's Date: 7/24/2025    Time Entry:   Time Calculation  Start Time: 1557  Stop Time: 1643  Time Calculation (min): 46 min     PT Therapeutic Procedures Time Entry  Therapeutic Exercise Time Entry: 45                 Insurance:  $20 copay  PA not req  BMN V  Visit: #5  POC: 15    Assessment:  PT session focuses on progressing therapeutic exercises to improve overall function of the left shoulder post operatively. Patient is challenged with new additions this date, particularly those requiring active elevation of the shoulder. HEP updated for additional progression outside of PT sessions. Patient remains limited in his overall function secondary to decreased ROM and weakness of the shoulder. He remains a good candidate for additional skilled PT at this time.   PT Assessment  PT Assessment Results: Decreased strength, Decreased range of motion, Pain  Rehab Prognosis: Good  Plan:  OP PT Plan  PT Plan: Skilled PT  Rehab Potential: Good  Plan of Care Agreement: Patient  Continue to progress per protocol    Current Problem  1. Acute pain of left shoulder                  General     General  Reason for Referral: L shoulder pain s/p L rTSA revision following infection on 6/3/25  Referred By: Danita Amaro PA-C  Past Medical History Relevant to Rehab: OA, MED, L shoulder infection    Subjective    Patient reports that he has been feeling good. Denies pain at the start of session. Some mild soreness after last session, but otherwise he tolerated the session well.       Precautions  Precautions  STEADI Fall Risk Score (The score of 4 or more indicates an increased risk of falling): 0  Precautions Comment: Infection: no DN/US/ES. Progress per protocol (in desk)  Pain  Pain Assessment  Pain Assessment: 0-10  0-10 (Numeric) Pain Score: 0 - No pain  Pain Type: Surgical pain  Pain Location: Shoulder  Pain Orientation: Left    Objective  "  Sx 6/3/25: 7 weeks, 2 days post op     L shoulder PROM:  Flexion 125*  Scaption 121*  ER (15 deg abd) 45*    L shoulder AAROM (pulleys)  Flexion 140    Treatments:  Therapeutic exercises:   UBE AAROM L 2.0 F/R 2' ea ALT  PROM L shoulder within pain and tissue restrictions   Supine wand flexion x15 NT  Supine wand ER x15 NT  Supine wand HABD/HADD x15 NT  Pulleys flexion/scaption x2' ea  Deltoid isometrics (shoulder flexed for posterior deltoid) 15x6\" ea NT  LAE RTT 2x10  Tricep pull down GTT 2x10 ea  IR/ER step outs RTT/RTT x15 ea   Bicep curl GTT x15  Submax ER isometric 15x6\"NT  Wall slides flexion/scaption x10 ea   Supine shoulder flexion x10      Units: 3    OP EDUCATION:     7/14/25:  Exercises  - Supine Shoulder Flexion Extension AAROM with Dowel  - 1 x daily - 7 x weekly - 1 sets - 10 reps - 5sec hold  - Supine Shoulder External Rotation with Dowel  - 1 x daily - 7 x weekly - 1 sets - 15 reps  - Supine Shoulder Press AAROM in Abduction with Dowel  - 1 x daily - 7 x weekly - 1 sets - 15 reps - 5 sec hold  7/21/25:    Exercises  - Supine Shoulder Horizontal Abduction Adduction AAROM with Dowel  - 1 x daily - 7 x weekly - 2 sets - 10 reps - 5 sec hold    Access Code: [email or web address]  URL: [email or web address]  Date: 07/24/2025  Prepared by: Raleigh Hernandes    Exercises  - Shoulder Flexion Wall Slide with Towel  - 1 x daily - 7 x weekly - 2 sets - 10 reps - 5 sec hold  - Scaption Wall Slide with Towel  - 1 x daily - 7 x weekly - 2 sets - 10 reps - 5 sec hold  - Standing Tricep Extensions with Resistance  - 1 x daily - 3 x weekly - 2 sets - 10 reps  - Standing Bicep Curls with Resistance  - 1 x daily - 3 x weekly - 2 sets - 10 reps  - Shoulder extension with resistance - Neutral  - 1 x daily - 3 x weekly - 2 sets - 10 reps - 3 sec hold  - Shoulder Internal Rotation Reactive Isometrics  - 1 x daily - 3 x weekly - 2 sets - 10 reps - 3 sec hold  - Shoulder External Rotation Reactive Isometrics  - 1 x " daily - 3 x weekly - 2 sets - 10 reps - 3 sec hold    Goals:  By discharge:  1. Patient will report and demonstrate independence with established HEP  2. Patient will report pain of 0/10 at rest, and no greater than 2/10 with any activity including reaching, lifting, dressing, and bathing  3. Patient will demonstrate active shoulder flexion to 130 deg, scaption to 120 deg, and ER to C1 to promote improved overall function and approach values present prior to surgery   4. Patient will demonstrate anterior, posterior, and middle deltoid strength >/= 4+/5 to allow for an improved ability to lift, dress, and perform all daily activities  5. Patient will demonstrate a decrease in QuickDash score by 11 points to </=  21/55 to meet established MCID for the outcome measure (baseline 7/8/28 32/55)  6. Patient will report the ability to sleep through the night 6/7 nights per week uninterrupted by pain to improve overall function throughout the day  7. Patient will demonstrate the ability to lift a 3lb weight onto a shelf above shoulder height 10x consecutively and without pain exceeding 2/10 to indicate an improved ability to peform light lifting tasks within the home  8. Patient will demonstrate active shoulder elevation against gravity >/= 130 deg without an increase in pain to indicate improved ability to perform overhead/shoulder tasks

## 2025-07-28 ENCOUNTER — TREATMENT (OUTPATIENT)
Dept: PHYSICAL THERAPY | Facility: CLINIC | Age: 45
End: 2025-07-28
Payer: COMMERCIAL

## 2025-07-28 DIAGNOSIS — M25.512 ACUTE PAIN OF LEFT SHOULDER: Primary | ICD-10-CM

## 2025-07-28 PROCEDURE — 97110 THERAPEUTIC EXERCISES: CPT | Mod: GP | Performed by: PHYSICAL THERAPIST

## 2025-07-28 ASSESSMENT — PAIN SCALES - GENERAL: PAINLEVEL_OUTOF10: 0 - NO PAIN

## 2025-07-28 ASSESSMENT — PAIN - FUNCTIONAL ASSESSMENT: PAIN_FUNCTIONAL_ASSESSMENT: 0-10

## 2025-07-28 NOTE — PROGRESS NOTES
Physical Therapy    Physical Therapy Treatment    Patient Name: Ernesto Thurston  MRN: [id number]  Today's Date: 7/28/2025    Time Entry:   Time Calculation  Start Time: 1600  Stop Time: 1643  Time Calculation (min): 43 min     PT Therapeutic Procedures Time Entry  Therapeutic Exercise Time Entry: 43                 Insurance:  $20 copay  PA not req  BMN V  Visit: #6  POC: 15    Assessment:  PT session continues to focus on improving both ROM and strength of the upper extremity within pain tolerance. Patient remains challenged with active motion of the shoulder, but this is improved compared to last session. Increased compensatory IR of the shoulder is noted during bicep curls, but he is able to control this with reduced weight. He remains below his baseline function at this time, and requires additional skilled PT.   PT Assessment  PT Assessment Results: Decreased strength, Decreased range of motion, Pain  Rehab Prognosis: Good  Plan:  OP PT Plan  PT Plan: Skilled PT  Rehab Potential: Good  Plan of Care Agreement: Patient  Continue to progress per protocol    Current Problem  1. Acute pain of left shoulder              General     General  Reason for Referral: L shoulder pain s/p L rTSA revision following infection on 6/3/25  Referred By: Danita Amaro PA-C  Past Medical History Relevant to Rehab: OA, MED, L shoulder infection    Subjective    Patient denies pain at the start of session. Reports that he has been working on his exercises at home without a significant increase in discomfort. Wall slides have been getting better.       Precautions  Precautions  STEADI Fall Risk Score (The score of 4 or more indicates an increased risk of falling): 0  Precautions Comment: Infection: no DN/US/ES. Progress per protocol (in desk)  Pain  Pain Assessment  Pain Assessment: 0-10  0-10 (Numeric) Pain Score: 0 - No pain    Objective   Sx 6/3/25: 7 weeks, 6 days post op     L shoulder PROM:  Flexion 136  Scaption 121*  ER (15 deg  "abd) 48*      Increased IR of the shoulder during bicep curls    Treatments:  Therapeutic exercises:   UBE 2.0 F/R 2' ea ALT  PROM L shoulder within pain and tissue restrictions   Supine wand flexion x15 NT  Supine wand ER x15 NT  Supine wand HABD/HADD x15 NT  Pulleys flexion/scaption x2' ea  Deltoid isometrics (shoulder flexed for posterior deltoid) 15x6\" ea NT  LAE GTT 2x10  3 way triceps GTT x15 ea   IR/ER step outs RTT/RTT x15 ea   Bicep curl GTT 2x10 NT  Submax ER isometric 15x6\"NT  Wall slides flexion/scaption x10 ea   Supine shoulder flexion x10 NT  Wall ladder flexion/scaption x10 ea   Eccentric biceps curl 3# 2x10    Units: 3    OP EDUCATION:     7/14/25:  Exercises  - Supine Shoulder Flexion Extension AAROM with Dowel  - 1 x daily - 7 x weekly - 1 sets - 10 reps - 5sec hold  - Supine Shoulder External Rotation with Dowel  - 1 x daily - 7 x weekly - 1 sets - 15 reps  - Supine Shoulder Press AAROM in Abduction with Dowel  - 1 x daily - 7 x weekly - 1 sets - 15 reps - 5 sec hold  7/21/25:    Exercises  - Supine Shoulder Horizontal Abduction Adduction AAROM with Dowel  - 1 x daily - 7 x weekly - 2 sets - 10 reps - 5 sec hold    Access Code: [email or web address]  URL: [email or web address]  Date: 07/24/2025  Prepared by: Raleigh Hernandes    Exercises  - Shoulder Flexion Wall Slide with Towel  - 1 x daily - 7 x weekly - 2 sets - 10 reps - 5 sec hold  - Scaption Wall Slide with Towel  - 1 x daily - 7 x weekly - 2 sets - 10 reps - 5 sec hold  - Standing Tricep Extensions with Resistance  - 1 x daily - 3 x weekly - 2 sets - 10 reps  - Standing Bicep Curls with Resistance  - 1 x daily - 3 x weekly - 2 sets - 10 reps  - Shoulder extension with resistance - Neutral  - 1 x daily - 3 x weekly - 2 sets - 10 reps - 3 sec hold  - Shoulder Internal Rotation Reactive Isometrics  - 1 x daily - 3 x weekly - 2 sets - 10 reps - 3 sec hold  - Shoulder External Rotation Reactive Isometrics  - 1 x daily - 3 x weekly - 2 " sets - 10 reps - 3 sec hold    Goals:  By discharge:  1. Patient will report and demonstrate independence with established HEP  2. Patient will report pain of 0/10 at rest, and no greater than 2/10 with any activity including reaching, lifting, dressing, and bathing  3. Patient will demonstrate active shoulder flexion to 130 deg, scaption to 120 deg, and ER to C1 to promote improved overall function and approach values present prior to surgery   4. Patient will demonstrate anterior, posterior, and middle deltoid strength >/= 4+/5 to allow for an improved ability to lift, dress, and perform all daily activities  5. Patient will demonstrate a decrease in QuickDash score by 11 points to </=  21/55 to meet established MCID for the outcome measure (baseline 7/8/28 32/55)  6. Patient will report the ability to sleep through the night 6/7 nights per week uninterrupted by pain to improve overall function throughout the day  7. Patient will demonstrate the ability to lift a 3lb weight onto a shelf above shoulder height 10x consecutively and without pain exceeding 2/10 to indicate an improved ability to peform light lifting tasks within the home  8. Patient will demonstrate active shoulder elevation against gravity >/= 130 deg without an increase in pain to indicate improved ability to perform overhead/shoulder tasks

## 2025-07-31 ENCOUNTER — TREATMENT (OUTPATIENT)
Dept: PHYSICAL THERAPY | Facility: CLINIC | Age: 45
End: 2025-07-31
Payer: COMMERCIAL

## 2025-07-31 DIAGNOSIS — M25.512 ACUTE PAIN OF LEFT SHOULDER: Primary | ICD-10-CM

## 2025-07-31 PROCEDURE — 97110 THERAPEUTIC EXERCISES: CPT | Mod: GP | Performed by: PHYSICAL THERAPIST

## 2025-07-31 ASSESSMENT — PAIN - FUNCTIONAL ASSESSMENT: PAIN_FUNCTIONAL_ASSESSMENT: 0-10

## 2025-07-31 ASSESSMENT — PAIN SCALES - GENERAL: PAINLEVEL_OUTOF10: 0 - NO PAIN

## 2025-07-31 NOTE — PROGRESS NOTES
Physical Therapy    Physical Therapy Treatment    Patient Name: Ernesto Thurston  MRN: [id number]  Today's Date: 7/31/2025    Time Entry:   Time Calculation  Start Time: 1600  Stop Time: 1639  Time Calculation (min): 39 min     PT Therapeutic Procedures Time Entry  Therapeutic Exercise Time Entry: 39                 Insurance:  $20 copay  PA not req  BMN V  Visit: #7  POC: 15    Assessment:  PT session continues to emphasize improving flexibility, strength, and stability of the left shoulder post operatively. Patient tolerates all treatment without reports of increased pain in the shoulder. However, fatigue is reported at end of session as expected. Continued difficulty regaining elevation ROM, but no significant increase in pain. Active ROM remains restricted due to weakness of the shoulder, and he is encouraged to continue his HEP. Additional skilled PT to focus on improving ROM, strength, and mechanics of the shoulder complex.   PT Assessment  PT Assessment Results: Decreased strength, Decreased range of motion, Pain  Rehab Prognosis: Good  Plan:  OP PT Plan  PT Plan: Skilled PT  Rehab Potential: Good  Plan of Care Agreement: Patient  Continue to progress per protocol    Current Problem  1. Acute pain of left shoulder                General     General  Reason for Referral: L shoulder pain s/p L rTSA revision following infection on 6/3/25  Referred By: Danita Amaro PA-C  Past Medical History Relevant to Rehab: OA, MED, L shoulder infection    Subjective    Pt denies pain in the shoulder at this time. Continues to work on his exercises, but is struggling to do his wall slides due to weakness.       Precautions  Precautions  STEADI Fall Risk Score (The score of 4 or more indicates an increased risk of falling): 0  Precautions Comment: Infection: no DN/US/ES. Progress per protocol (in desk)  Pain  Pain Assessment  Pain Assessment: 0-10  0-10 (Numeric) Pain Score: 0 - No pain  Pain Type: Surgical pain  Pain Location:  "Shoulder  Pain Orientation: Left    Objective   Sx 6/3/25: 8 weeks, 2 days post op     L shoulder PROM:  Flexion 136  Scaption 121*  ER (15 deg abd) 50*      Increased IR of the shoulder during bicep curls    Treatments:  Therapeutic exercises:   UBE 2.0 F/R 2.5' ea   PROM L shoulder within pain and tissue restrictions   Supine wand flexion x15 NT  Supine wand ER x15 NT  Supine wand HABD/HADD x15 NT  Pulleys flexion/scaption x2' ea  Deltoid isometrics (shoulder flexed for posterior deltoid) 15x6\" ea NT  LAE GTT 2x10 NT  3 way triceps GTT x15 ea   IR/ER step outs RTT/RTT x15 ea NT  Wall slides flexion/scaption x10 ea   Supine shoulder flexion x15   Supine shoulder scaption x15   Wall ladder flexion/scaption x10 ea NT  Eccentric biceps curl 3# 2x10  Prone Row/ext 2# 2x10 ea     Units: 3    OP EDUCATION:     7/14/25:  Exercises  - Supine Shoulder Flexion Extension AAROM with Dowel  - 1 x daily - 7 x weekly - 1 sets - 10 reps - 5sec hold  - Supine Shoulder External Rotation with Dowel  - 1 x daily - 7 x weekly - 1 sets - 15 reps  - Supine Shoulder Press AAROM in Abduction with Dowel  - 1 x daily - 7 x weekly - 1 sets - 15 reps - 5 sec hold  7/21/25:    Exercises  - Supine Shoulder Horizontal Abduction Adduction AAROM with Dowel  - 1 x daily - 7 x weekly - 2 sets - 10 reps - 5 sec hold    Access Code: [email or web address]  URL: [email or web address]  Date: 07/24/2025  Prepared by: Raleigh Hernandes    Exercises  - Shoulder Flexion Wall Slide with Towel  - 1 x daily - 7 x weekly - 2 sets - 10 reps - 5 sec hold  - Scaption Wall Slide with Towel  - 1 x daily - 7 x weekly - 2 sets - 10 reps - 5 sec hold  - Standing Tricep Extensions with Resistance  - 1 x daily - 3 x weekly - 2 sets - 10 reps  - Standing Bicep Curls with Resistance  - 1 x daily - 3 x weekly - 2 sets - 10 reps  - Shoulder extension with resistance - Neutral  - 1 x daily - 3 x weekly - 2 sets - 10 reps - 3 sec hold  - Shoulder Internal Rotation Reactive " Isometrics  - 1 x daily - 3 x weekly - 2 sets - 10 reps - 3 sec hold  - Shoulder External Rotation Reactive Isometrics  - 1 x daily - 3 x weekly - 2 sets - 10 reps - 3 sec hold    Goals:  By discharge:  1. Patient will report and demonstrate independence with established HEP  2. Patient will report pain of 0/10 at rest, and no greater than 2/10 with any activity including reaching, lifting, dressing, and bathing  3. Patient will demonstrate active shoulder flexion to 130 deg, scaption to 120 deg, and ER to C1 to promote improved overall function and approach values present prior to surgery   4. Patient will demonstrate anterior, posterior, and middle deltoid strength >/= 4+/5 to allow for an improved ability to lift, dress, and perform all daily activities  5. Patient will demonstrate a decrease in QuickDash score by 11 points to </=  21/55 to meet established MCID for the outcome measure (baseline 7/8/28 32/55)  6. Patient will report the ability to sleep through the night 6/7 nights per week uninterrupted by pain to improve overall function throughout the day  7. Patient will demonstrate the ability to lift a 3lb weight onto a shelf above shoulder height 10x consecutively and without pain exceeding 2/10 to indicate an improved ability to peform light lifting tasks within the home  8. Patient will demonstrate active shoulder elevation against gravity >/= 130 deg without an increase in pain to indicate improved ability to perform overhead/shoulder tasks

## 2025-08-04 ENCOUNTER — APPOINTMENT (OUTPATIENT)
Dept: PHYSICAL THERAPY | Facility: CLINIC | Age: 45
End: 2025-08-04
Payer: COMMERCIAL

## 2025-08-04 ENCOUNTER — APPOINTMENT (OUTPATIENT)
Dept: CARDIOLOGY | Facility: CLINIC | Age: 45
End: 2025-08-04
Payer: COMMERCIAL

## 2025-08-06 ENCOUNTER — OFFICE VISIT (OUTPATIENT)
Dept: ORTHOPEDIC SURGERY | Facility: HOSPITAL | Age: 45
End: 2025-08-06
Payer: COMMERCIAL

## 2025-08-06 DIAGNOSIS — T84.9XXA: Primary | ICD-10-CM

## 2025-08-06 DIAGNOSIS — Z96.612: Primary | ICD-10-CM

## 2025-08-06 PROCEDURE — 99212 OFFICE O/P EST SF 10 MIN: CPT

## 2025-08-06 PROCEDURE — 99024 POSTOP FOLLOW-UP VISIT: CPT | Performed by: ORTHOPAEDIC SURGERY

## 2025-08-06 ASSESSMENT — PAIN SCALES - GENERAL: PAINLEVEL_OUTOF10: 2

## 2025-08-06 ASSESSMENT — PAIN - FUNCTIONAL ASSESSMENT: PAIN_FUNCTIONAL_ASSESSMENT: 0-10

## 2025-08-06 NOTE — PROGRESS NOTES
Patient is 2 months status post revision arthroplasty left shoulder he is doing generally very well he has very little discomfort.  He is on chronic suppressive antibiotics.  On exam left shoulder incision is healed well the wound on the lateral aspect of the deltoid is intact and patent not inflamed active shoulder motion elevation 90 degrees external rotation and abduction 80 degrees motor power abduction 4/5.  No peripheral edema.    Left shoulder infected prosthesis    Patient is doing well with chronic suppression.  He may pursue activities gradually increasing as tolerated including starting to hit golf balls to begin pitching only.    Follow-up next month for repeat exam and x-ray.    This was dictated using voice recognition software and not corrected for grammatical or spelling errors.

## 2025-08-07 ENCOUNTER — TREATMENT (OUTPATIENT)
Dept: PHYSICAL THERAPY | Facility: CLINIC | Age: 45
End: 2025-08-07
Payer: COMMERCIAL

## 2025-08-07 DIAGNOSIS — M25.512 ACUTE PAIN OF LEFT SHOULDER: Primary | ICD-10-CM

## 2025-08-07 PROCEDURE — 97110 THERAPEUTIC EXERCISES: CPT | Mod: GP | Performed by: PHYSICAL THERAPIST

## 2025-08-07 NOTE — PROGRESS NOTES
Physical Therapy Treatment    Patient Name: Ernesto Thurston  MRN: [id number]  YOB: 1980  Encounter Date: 8/7/2025    Time Entry:  Time Calculation  Start Time: 1645  Stop Time: 1725  Time Calculation (min): 40 min     PT Therapeutic Procedures Time Entry  Therapeutic Exercise Time Entry: 40                   Rehab Insurance Information:   Visit Count: 8  POC Visits: 15  Auth Required: No        Additional Authorization/Insurance Information: $20 copay PA not req BMN V    Rehab Falls Risk Assessment:  Fall Risk Indicated: No      Problem List Items Addressed This Visit           ICD-10-CM       Musculoskeletal and Injuries    Left shoulder pain - Primary M25.512       Precautions       Additional Precautions and Protocol Details: No ES/US/DN d/t infection. Progress per protocol in chart. Sx 6/3/25    Subjective   Patient denies pain at this time. Saw the surgeon yesterday who was happy with his progress, but noted that he needs to work on more strength of the shoulder..  Pain reported as 0.           Objective         Shoulder Range of Motion   Increased scapular elevation during shoulder elevation             Activities   Therapeutic Exercise  Therapeutic Exercise Performed: Yes  Therapeutic Exercise Activity 1: UBE 2.0 F/R 2.5' ea  Therapeutic Exercise Activity 2: Pulleys flexion/scaption x2' ea  Therapeutic Exercise Activity 3: Supine shoulder flexion 2x10  Therapeutic Exercise Activity 4: Supine shoulder scaption 2x10  Therapeutic Exercise Activity 5: Rows BTT 2x10  Therapeutic Exercise Activity 6: LAE GTT 2x10  Therapeutic Exercise Activity 7: Eccentric biceps curl 3# 3x10  Therapeutic Exercise Activity 8: Prone Row/ext 2# 2x10 ea  Therapeutic Exercise Activity 9: SL shoulder flexion/ER/abd x15 ea  Therapeutic Exercise Activity 10: Wall slides flexion/scaption x10 ea                                                       Education  Education was done with Patient.           8/7/25: Access Code:  [email or web address] URL: [email or web address] Date: 08/07/2025 Prepared by: Raleigh Hernandes Exercises - Sidelying Shoulder External Rotation  - 1 x daily - 7 x weekly - 2 sets - 10 reps - Sidelying Shoulder Abduction  - 1 x daily - 7 x weekly - 2 sets - 10 reps - Sidelying Shoulder Flexion 15 Degrees  - 1 x daily - 7 x weekly - 2 sets - 10 reps      Assessment/Plan   Assessment: Treatment session emphasizes improving AROM of the shoulder, as well as increasing strength and stability. Patient tolerates treatment well, without reports of increased pain, but increased muscular fatigue as expected for given activities. Continued difficulty with raising the arm against gravity without scapular elevation to compensate. HEP update for sidelying exercises to improve scapulothoracic mechanics. He remains below his baseline function at this time, and would benefit from additional skilled PT.        Plan: Continue to progress flexibility and strength per protocol

## 2025-08-11 ENCOUNTER — TREATMENT (OUTPATIENT)
Dept: PHYSICAL THERAPY | Facility: CLINIC | Age: 45
End: 2025-08-11
Payer: COMMERCIAL

## 2025-08-11 ENCOUNTER — APPOINTMENT (OUTPATIENT)
Dept: CARDIOLOGY | Facility: CLINIC | Age: 45
End: 2025-08-11
Payer: COMMERCIAL

## 2025-08-11 DIAGNOSIS — M25.512 ACUTE PAIN OF LEFT SHOULDER: Primary | ICD-10-CM

## 2025-08-11 PROCEDURE — 97110 THERAPEUTIC EXERCISES: CPT | Mod: GP | Performed by: PHYSICAL THERAPIST

## 2025-08-13 ENCOUNTER — TREATMENT (OUTPATIENT)
Dept: PHYSICAL THERAPY | Facility: CLINIC | Age: 45
End: 2025-08-13
Payer: COMMERCIAL

## 2025-08-13 DIAGNOSIS — M25.512 ACUTE PAIN OF LEFT SHOULDER: Primary | ICD-10-CM

## 2025-08-13 PROCEDURE — 97110 THERAPEUTIC EXERCISES: CPT | Mod: GP | Performed by: PHYSICAL THERAPIST

## 2025-08-20 ENCOUNTER — TREATMENT (OUTPATIENT)
Dept: PHYSICAL THERAPY | Facility: CLINIC | Age: 45
End: 2025-08-20
Payer: COMMERCIAL

## 2025-08-20 DIAGNOSIS — M25.512 ACUTE PAIN OF LEFT SHOULDER: Primary | ICD-10-CM

## 2025-08-20 PROCEDURE — 97110 THERAPEUTIC EXERCISES: CPT | Mod: GP | Performed by: PHYSICAL THERAPIST

## 2025-08-27 ENCOUNTER — TREATMENT (OUTPATIENT)
Dept: PHYSICAL THERAPY | Facility: CLINIC | Age: 45
End: 2025-08-27
Payer: COMMERCIAL

## 2025-08-27 DIAGNOSIS — M25.512 ACUTE PAIN OF LEFT SHOULDER: Primary | ICD-10-CM

## 2025-08-27 PROCEDURE — 97110 THERAPEUTIC EXERCISES: CPT | Mod: GP | Performed by: PHYSICAL THERAPIST

## 2025-09-03 ENCOUNTER — TREATMENT (OUTPATIENT)
Dept: PHYSICAL THERAPY | Facility: CLINIC | Age: 45
End: 2025-09-03
Payer: COMMERCIAL

## 2025-09-03 ENCOUNTER — OFFICE VISIT (OUTPATIENT)
Dept: ORTHOPEDIC SURGERY | Facility: HOSPITAL | Age: 45
End: 2025-09-03
Payer: COMMERCIAL

## 2025-09-03 ENCOUNTER — HOSPITAL ENCOUNTER (OUTPATIENT)
Dept: RADIOLOGY | Facility: HOSPITAL | Age: 45
Discharge: HOME | End: 2025-09-03
Payer: COMMERCIAL

## 2025-09-03 DIAGNOSIS — Z96.612 STATUS POST TOTAL SHOULDER ARTHROPLASTY, LEFT: ICD-10-CM

## 2025-09-03 DIAGNOSIS — T84.89XA OTHER SPECIFIED COMPLICATION OF INTERNAL ORTHOPEDIC PROSTHETIC DEVICES, IMPLANTS AND GRAFTS, INITIAL ENCOUNTER: ICD-10-CM

## 2025-09-03 DIAGNOSIS — M25.512 ACUTE PAIN OF LEFT SHOULDER: ICD-10-CM

## 2025-09-03 PROCEDURE — 73030 X-RAY EXAM OF SHOULDER: CPT | Mod: LEFT SIDE | Performed by: RADIOLOGY

## 2025-09-03 PROCEDURE — 97110 THERAPEUTIC EXERCISES: CPT | Mod: GP | Performed by: PHYSICAL THERAPIST

## 2025-09-03 PROCEDURE — 99213 OFFICE O/P EST LOW 20 MIN: CPT | Performed by: ORTHOPAEDIC SURGERY

## 2025-09-03 PROCEDURE — 73030 X-RAY EXAM OF SHOULDER: CPT | Mod: LT

## 2025-09-03 ASSESSMENT — PAIN - FUNCTIONAL ASSESSMENT: PAIN_FUNCTIONAL_ASSESSMENT: 0-10

## 2025-09-03 ASSESSMENT — PAIN SCALES - GENERAL: PAINLEVEL_OUTOF10: 0 - NO PAIN

## 2025-09-15 ENCOUNTER — APPOINTMENT (OUTPATIENT)
Dept: CARDIOLOGY | Facility: CLINIC | Age: 45
End: 2025-09-15
Payer: COMMERCIAL

## 2025-09-24 ENCOUNTER — APPOINTMENT (OUTPATIENT)
Dept: PRIMARY CARE | Facility: CLINIC | Age: 45
End: 2025-09-24
Payer: COMMERCIAL

## (undated) DEVICE — SPONGE, LAP, XRAY DECT, 18IN X 18IN, W/MASTER DMT, STERILE

## (undated) DEVICE — MASK, FACE, TENET, FOAM POSITIONING, DISPOSABLE

## (undated) DEVICE — SUTURE, VICRYL, 3-0, 18 IN, PS2, UNDYED

## (undated) DEVICE — ELECTRODE, ELECTROSURGICAL, NEEDLE, 1.1 IN, LF

## (undated) DEVICE — Device

## (undated) DEVICE — BLADE, OSCILLATING/SAGITTAL, AGGRESSIVE, WIDE

## (undated) DEVICE — GLOVE, SURGICAL, PROTEXIS MICRO, 8.0, PF, LATEX

## (undated) DEVICE — BANDAGE, COFLEX, 4 X 5 YDS, TAN, STERILE, LF

## (undated) DEVICE — SUTURE, MONOCRYL, 4-0, 18 IN, PS2, UNDYED

## (undated) DEVICE — PREP TRAY, VAGINAL

## (undated) DEVICE — DRAPE, SHEET, THREE QUARTER, FAN FOLD, 57 X 77 IN

## (undated) DEVICE — DRAPE, SHEET, U, STERI DRAPE, 47 X 51 IN, DISPOSABLE, STERILE

## (undated) DEVICE — IMMOBILIZER, SHOULDER, SLING & SWATHE, DELUXE, ADULT

## (undated) DEVICE — SCREW DRILL, PERIPHERAL, 2.7MM

## (undated) DEVICE — DRESSING, GAUZE, FLUFF, 1 PLY, 18 X 36 IN

## (undated) DEVICE — ADHESIVE, DERMABOND, PRINEO, 12 X 9, STERILE

## (undated) DEVICE — WOUND SYSTEM, DEBRIDEMENT & CLEANING, O.R DUOPAK

## (undated) DEVICE — DRESSING, MEPILEX BORDER, POST-OP AG, 4 X 10 IN

## (undated) DEVICE — DRAPE, SHEET, EXTREMITY, W/ARM BOARD COVERS, 87 X 106 X 128 IN, DISPOSABLE, LF, STERILE

## (undated) DEVICE — BANDAGE, COFLEX, 4 X 5 YDS, FOAM TAN, STERILE, LF

## (undated) DEVICE — NEEDLE, TAPERED, W/ NITIONAL LOOP, T-5, 1/2 CIRCLE, 26.5MM LONG

## (undated) DEVICE — CLOSURE SYSTEM, DERMABOND, PRINEO, 22CM, STERILE

## (undated) DEVICE — SUTURE, ETHIBOND, 2, CUSTOM, GREEN/WHITE

## (undated) DEVICE — GLOVE, SURGEON, PREMIERPRO PI, MICRO, SZ-6.0, PF, WH

## (undated) DEVICE — SUTURE, VICRYL, 0, 27 IN, UR-6, VIOLET

## (undated) DEVICE — GLOVE, SURGICAL, PROTEXIS PI MICRO, 7.5, PF, LF

## (undated) DEVICE — DRAPE, INCISE, ANTIMICROBIAL, IOBAN 2, LARGE, 17 X 23 IN, DISPOSABLE, STERILE

## (undated) DEVICE — COVER, BACK TABLE, 65 X 90, HVY REINFORCED

## (undated) DEVICE — IMPLANTABLE DEVICE
Type: IMPLANTABLE DEVICE | Site: SHOULDER | Status: NON-FUNCTIONAL
Brand: COMPREHENSIVE® REVERSE SHOULDER

## (undated) DEVICE — MIXER, CEMENT, MIXEVAC III HIGH VACUUM KIT, STERILE